# Patient Record
Sex: FEMALE | Race: BLACK OR AFRICAN AMERICAN | Employment: FULL TIME | URBAN - METROPOLITAN AREA
[De-identification: names, ages, dates, MRNs, and addresses within clinical notes are randomized per-mention and may not be internally consistent; named-entity substitution may affect disease eponyms.]

---

## 2017-01-12 ENCOUNTER — OFFICE VISIT (OUTPATIENT)
Dept: FAMILY MEDICINE CLINIC | Facility: CLINIC | Age: 62
End: 2017-01-12

## 2017-01-12 VITALS
HEIGHT: 69 IN | SYSTOLIC BLOOD PRESSURE: 137 MMHG | BODY MASS INDEX: 37.01 KG/M2 | TEMPERATURE: 98.6 F | DIASTOLIC BLOOD PRESSURE: 72 MMHG | HEART RATE: 102 BPM | WEIGHT: 249.9 LBS | OXYGEN SATURATION: 99 % | RESPIRATION RATE: 14 BRPM

## 2017-01-12 DIAGNOSIS — I10 ESSENTIAL HYPERTENSION: Primary | ICD-10-CM

## 2017-01-12 DIAGNOSIS — E78.5 HYPERLIPIDEMIA, UNSPECIFIED HYPERLIPIDEMIA TYPE: ICD-10-CM

## 2017-01-12 DIAGNOSIS — D64.9 MILD CHRONIC ANEMIA: ICD-10-CM

## 2017-01-12 DIAGNOSIS — E66.9 OBESITY (BMI 30-39.9): ICD-10-CM

## 2017-01-12 DIAGNOSIS — R73.02 IMPAIRED GLUCOSE TOLERANCE: ICD-10-CM

## 2017-01-12 RX ORDER — LANOLIN ALCOHOL/MO/W.PET/CERES
325 CREAM (GRAM) TOPICAL 2 TIMES DAILY WITH MEALS
Qty: 60 TAB | Refills: 3 | Status: SHIPPED | OUTPATIENT
Start: 2017-01-12 | End: 2017-05-27 | Stop reason: SDUPTHER

## 2017-01-12 RX ORDER — AMLODIPINE BESYLATE 10 MG/1
10 TABLET ORAL DAILY
Qty: 30 TAB | Refills: 3 | Status: SHIPPED | OUTPATIENT
Start: 2017-01-12 | End: 2017-06-13 | Stop reason: SDUPTHER

## 2017-01-12 RX ORDER — LOSARTAN POTASSIUM AND HYDROCHLOROTHIAZIDE 25; 100 MG/1; MG/1
1 TABLET ORAL DAILY
Qty: 30 TAB | Refills: 3 | Status: SHIPPED | OUTPATIENT
Start: 2017-01-12 | End: 2017-06-13 | Stop reason: SDUPTHER

## 2017-01-12 RX ORDER — ATORVASTATIN CALCIUM 20 MG/1
20 TABLET, FILM COATED ORAL DAILY
Qty: 30 TAB | Refills: 3 | Status: SHIPPED | OUTPATIENT
Start: 2017-01-12 | End: 2017-06-13 | Stop reason: SDUPTHER

## 2017-01-12 NOTE — PATIENT INSTRUCTIONS
Body Mass Index: Care Instructions  Your Care Instructions    Body mass index (BMI) can help you see if your weight is raising your risk for health problems. It uses a formula to compare how much you weigh with how tall you are. A BMI between 18.5 and 24.9 is considered healthy. A BMI between 25 and 29.9 is considered overweight. A BMI of 30 or higher is considered obese. If your BMI is in the normal range, it means that you have a lower risk for weight-related health problems. If your BMI is in the overweight or obese range, you may be at increased risk for weight-related health problems, such as high blood pressure, heart disease, stroke, arthritis or joint pain, and diabetes. BMI is just one measure of your risk for weight-related health problems. You may be at higher risk for health problems if you are not active, you eat an unhealthy diet, or you drink too much alcohol or use tobacco products. Follow-up care is a key part of your treatment and safety. Be sure to make and go to all appointments, and call your doctor if you are having problems. It's also a good idea to know your test results and keep a list of the medicines you take. How can you care for yourself at home? · Practice healthy eating habits. This includes eating plenty of fruits, vegetables, whole grains, lean protein, and low-fat dairy. · Get at least 30 minutes of exercise 5 days a week or more. Brisk walking is a good choice. You also may want to do other activities, such as running, swimming, cycling, or playing tennis or team sports. · Do not smoke. Smoking can increase your risk for health problems. If you need help quitting, talk to your doctor about stop-smoking programs and medicines. These can increase your chances of quitting for good. · Limit alcohol to 2 drinks a day for men and 1 drink a day for women. Too much alcohol can cause health problems.   If you have a BMI higher than 25  · Your doctor may do other tests to check your risk for weight-related health problems. This may include measuring the distance around your waist. A waist measurement of more than 40 inches in men or 35 inches in women can increase the risk of weight-related health problems. · Talk with your doctor about steps you can take to stay healthy or improve your health. You may need to make lifestyle changes to lose weight and stay healthy, such as changing your diet and getting regular exercise. Where can you learn more? Go to http://nahid-maureen.info/. Enter S176 in the search box to learn more about \"Body Mass Index: Care Instructions. \"  Current as of: February 16, 2016  Content Version: 11.1  © 1593-7339 [employer]. Care instructions adapted under license by [employer] (which disclaims liability or warranty for this information). If you have questions about a medical condition or this instruction, always ask your healthcare professional. [address] any warranty or liability for your use of this information. DASH Diet: Care Instructions  Your Care Instructions  The DASH diet is an eating plan that can help lower your blood pressure. DASH stands for Dietary Approaches to Stop Hypertension. Hypertension is high blood pressure. The DASH diet focuses on eating foods that are high in calcium, potassium, and magnesium. These nutrients can lower blood pressure. The foods that are highest in these nutrients are fruits, vegetables, low-fat dairy products, nuts, seeds, and legumes. But taking calcium, potassium, and magnesium supplements instead of eating foods that are high in those nutrients does not have the same effect. The DASH diet also includes whole grains, fish, and poultry. The DASH diet is one of several lifestyle changes your doctor may recommend to lower your high blood pressure. Your doctor may also want you to decrease the amount of sodium in your diet.  Lowering sodium while following the DASH diet can lower blood pressure even further than just the DASH diet alone. Follow-up care is a key part of your treatment and safety. Be sure to make and go to all appointments, and call your doctor if you are having problems. It's also a good idea to know your test results and keep a list of the medicines you take. How can you care for yourself at home? Following the DASH diet  · Eat 4 to 5 servings of fruit each day. A serving is 1 medium-sized piece of fruit, ½ cup chopped or canned fruit, 1/4 cup dried fruit, or 4 ounces (½ cup) of fruit juice. Choose fruit more often than fruit juice. · Eat 4 to 5 servings of vegetables each day. A serving is 1 cup of lettuce or raw leafy vegetables, ½ cup of chopped or cooked vegetables, or 4 ounces (½ cup) of vegetable juice. Choose vegetables more often than vegetable juice. · Get 2 to 3 servings of low-fat and fat-free dairy each day. A serving is 8 ounces of milk, 1 cup of yogurt, or 1 ½ ounces of cheese. · Eat 6 to 8 servings of grains each day. A serving is 1 slice of bread, 1 ounce of dry cereal, or ½ cup of cooked rice, pasta, or cooked cereal. Try to choose whole-grain products as much as possible. · Limit lean meat, poultry, and fish to 2 servings each day. A serving is 3 ounces, about the size of a deck of cards. · Eat 4 to 5 servings of nuts, seeds, and legumes (cooked dried beans, lentils, and split peas) each week. A serving is 1/3 cup of nuts, 2 tablespoons of seeds, or ½ cup of cooked beans or peas. · Limit fats and oils to 2 to 3 servings each day. A serving is 1 teaspoon of vegetable oil or 2 tablespoons of salad dressing. · Limit sweets and added sugars to 5 servings or less a week. A serving is 1 tablespoon jelly or jam, ½ cup sorbet, or 1 cup of lemonade. · Eat less than 2,300 milligrams (mg) of sodium a day. If you limit your sodium to 1,500 mg a day, you can lower your blood pressure even more.   Tips for success  · Start small. Do not try to make dramatic changes to your diet all at once. You might feel that you are missing out on your favorite foods and then be more likely to not follow the plan. Make small changes, and stick with them. Once those changes become habit, add a few more changes. · Try some of the following:  ¨ Make it a goal to eat a fruit or vegetable at every meal and at snacks. This will make it easy to get the recommended amount of fruits and vegetables each day. ¨ Try yogurt topped with fruit and nuts for a snack or healthy dessert. ¨ Add lettuce, tomato, cucumber, and onion to sandwiches. ¨ Combine a ready-made pizza crust with low-fat mozzarella cheese and lots of vegetable toppings. Try using tomatoes, squash, spinach, broccoli, carrots, cauliflower, and onions. ¨ Have a variety of cut-up vegetables with a low-fat dip as an appetizer instead of chips and dip. ¨ Sprinkle sunflower seeds or chopped almonds over salads. Or try adding chopped walnuts or almonds to cooked vegetables. ¨ Try some vegetarian meals using beans and peas. Add garbanzo or kidney beans to salads. Make burritos and tacos with mashed brown beans or black beans. Where can you learn more? Go to http://nahid-marueen.info/. Enter M539 in the search box to learn more about \"DASH Diet: Care Instructions. \"  Current as of: March 23, 2016  Content Version: 11.1  © 7476-8560 [employer]. Care instructions adapted under license by [employer] (which disclaims liability or warranty for this information). If you have questions about a medical condition or this instruction, always ask your healthcare professional. [place] [address] any warranty or liability for your use of this information. High Cholesterol: Care Instructions  Your Care Instructions  Cholesterol is a type of fat in your blood. It is needed for many body functions, such as making new cells.  Cholesterol is made by your body. It also comes from food you eat. High cholesterol means that you have too much of the fat in your blood. This raises your risk of a heart attack and stroke. LDL and HDL are part of your total cholesterol. LDL is the \"bad\" cholesterol. High LDL can raise your risk for heart disease, heart attack, and stroke. HDL is the \"good\" cholesterol. It helps clear bad cholesterol from the body. High HDL is linked with a lower risk of heart disease, heart attack, and stroke. Your cholesterol levels help your doctor find out your risk for having a heart attack or stroke. You and your doctor can talk about whether you need to lower your risk and what treatment is best for you. A heart-healthy lifestyle along with medicines can help lower your cholesterol and your risk. The way you choose to lower your risk will depend on how high your risk is for heart attack and stroke. It will also depend on how you feel about taking medicines. Follow-up care is a key part of your treatment and safety. Be sure to make and go to all appointments, and call your doctor if you are having problems. It's also a good idea to know your test results and keep a list of the medicines you take. How can you care for yourself at home? · Eat a variety of foods every day. Good choices include fruits, vegetables, whole grains (like oatmeal), dried beans and peas, nuts and seeds, soy products (like tofu), and fat-free or low-fat dairy products. · Replace butter, margarine, and hydrogenated or partially hydrogenated oils with olive and canola oils. (Canola oil margarine without trans fat is fine.)  · Replace red meat with fish, poultry, and soy protein (like tofu). · Limit processed and packaged foods like chips, crackers, and cookies. · Bake, broil, or steam foods. Don't arevalo them. · Be physically active. Get at least 30 minutes of exercise on most days of the week. Walking is a good choice.  You also may want to do other activities, such as running, swimming, cycling, or playing tennis or team sports. · Stay at a healthy weight or lose weight by making the changes in eating and physical activity listed above. Losing just a small amount of weight, even 5 to 10 pounds, can reduce your risk for having a heart attack or stroke. · Do not smoke. When should you call for help? Watch closely for changes in your health, and be sure to contact your doctor if:  · You need help making lifestyle changes. · You have questions about your medicine. Where can you learn more? Go to http://nahid-maureen.info/. Enter I621 in the search box to learn more about \"High Cholesterol: Care Instructions. \"  Current as of: January 27, 2016  Content Version: 11.1  © 4280-0937 [employer]. Care instructions adapted under license by [employer] (which disclaims liability or warranty for this information). If you have questions about a medical condition or this instruction, always ask your healthcare professional. [place] [address] any warranty or liability for your use of this information. Prediabetes: Care Instructions  Your Care Instructions  Prediabetes is a warning sign that you are at risk for getting type 2 diabetes. It means that your blood sugar is higher than it should be. The food you eat turns into sugar, which your body uses for energy. Normally, an organ called the pancreas makes insulin, which allows the sugar in your blood to get into your body's cells. But when your body can't use insulin the right way, the sugar doesn't move into cells. It stays in your blood instead. This is called insulin resistance. The buildup of sugar in the blood causes prediabetes. The good news is that lifestyle changes may help you get your blood sugar back to normal and help you avoid or delay diabetes. Follow-up care is a key part of your treatment and safety.  Be sure to make and go to all appointments, and call your doctor if you are having problems. It's also a good idea to know your test results and keep a list of the medicines you take. How can you care for yourself at home? · Watch your weight. A healthy weight helps your body use insulin properly. · Limit the amount of calories, sweets, and unhealthy fat you eat. Ask your doctor if you should see a dietitian. A registered dietitian can help you create meal plans that fit your lifestyle. · Get at least 30 minutes of exercise on most days of the week. Exercise helps control your blood sugar. It also helps you maintain a healthy weight. Walking is a good choice. You also may want to do other activities, such as running, swimming, cycling, or playing tennis or team sports. · Do not smoke. Smoking can make prediabetes worse. If you need help quitting, talk to your doctor about stop-smoking programs and medicines. These can increase your chances of quitting for good. · If your doctor prescribed medicines, take them exactly as prescribed. Call your doctor if you think you are having a problem with your medicine. You will get more details on the specific medicines your doctor prescribes. When should you call for help? Watch closely for changes in your health, and be sure to contact your doctor if:  · You have any symptoms of diabetes. These may include:  ¨ Being thirsty more often. ¨ Urinating more. ¨ Being hungrier. ¨ Losing weight. ¨ Being very tired. ¨ Having blurry vision. · You have a wound that will not heal.  · You have an infection that will not go away. · You have problems with your blood pressure. · You want more information about diabetes and how you can keep from getting it. Where can you learn more? Go to http://nahid-maureen.info/. Enter I222 in the search box to learn more about \"Prediabetes: Care Instructions. \"  Current as of: May 23, 2016  Content Version: 11.1  © 3271-1673 [employer].  Care instructions adapted under license by [employer] (which disclaims liability or warranty for this information). If you have questions about a medical condition or this instruction, always ask your healthcare professional. [address] any warranty or liability for your use of this information.

## 2017-01-12 NOTE — MR AVS SNAPSHOT
Visit Information Date & Time Provider Department Dept. Phone Encounter #  
 1/12/2017  9:45 AM Grace Burroughs MD [employer] [phone] [id number] Follow-up Instructions Return in about 3 months (around 4/12/2017), or if symptoms worsen or fail to improve, for HTN, Obesity, HLD. Upcoming Health Maintenance Date Due FOBT Q 1 YEAR AGE 50-75 7/23/2006 ZOSTER VACCINE AGE 60> 7/23/2016 BREAST CANCER SCRN MAMMOGRAM 7/26/2018 DTaP/Tdap/Td series (2 - Td) 10/31/2022 Allergies as of 1/12/2017  Review Complete On: 1/12/2017 By: Carole Padron No Known Allergies Current Immunizations  Reviewed on 10/12/2016 Name Date Influenza Vaccine (Quad) PF 10/12/2016 10:52 AM, 2/1/2016 10:37 AM  
 Influenza Vaccine PF 12/11/2013 TDAP Vaccine 10/31/2012 12:56 AM  
  
 Not reviewed this visit You Were Diagnosed With   
  
 Codes Comments Essential hypertension    -  Primary ICD-10-CM: I10 
ICD-9-CM: 401.9 Impaired glucose tolerance     ICD-10-CM: R73.02 
ICD-9-CM: 790.22 Hyperlipidemia, unspecified hyperlipidemia type     ICD-10-CM: E78.5 ICD-9-CM: 272.4 Obesity (BMI 30-39. 9)     ICD-10-CM: E66.9 ICD-9-CM: 278.00 Mild chronic anemia     ICD-10-CM: D64.9 ICD-9-CM: 661. 9 Vitals BP Pulse Temp Resp Height(growth percentile) Weight(growth percentile)  
 137/72 (BP 1 Location: Right arm, BP Patient Position: Sitting) (!) 112 98.6 °F (37 °C) (Oral) 14 5' 9\" (1.753 m) 249 lb 14.4 oz (113.4 kg) SpO2 BMI OB Status Smoking Status 99% 36.9 kg/m2 Hysterectomy Never Smoker BMI and BSA Data Body Mass Index Body Surface Area  
 36.9 kg/m 2 2.35 m 2 Preferred Pharmacy Pharmacy Name Phone [facility] [address] [phone] Your Updated Medication List  
  
   
This list is accurate as of: 1/12/17 10:26 AM.  Always use your most recent med list. amLODIPine 10 mg tablet Commonly known as:  Erickson Alstrom Take 1 Tab by mouth daily. atorvastatin 20 mg tablet Commonly known as:  LIPITOR Take 1 Tab by mouth daily. bisacodyl 5 mg EC tablet Commonly known as:  DULCOLAX (BISACODYL) Take 1 Tab by mouth daily. ferrous sulfate 325 mg (65 mg iron) tablet Take 1 Tab by mouth two (2) times daily (with meals). fexofenadine 180 mg tablet Commonly known as:  Johanne Frankel Take 1 Tab by mouth daily. losartan-hydroCHLOROthiazide 100-25 mg per tablet Commonly known as:  HYZAAR Take 1 Tab by mouth daily. Magnesium Oxide 500 mg Cap Take  by mouth daily. morinda citrifolia fruit 250 mg Cap Take 1 Cap by mouth daily. ONE-A-DAY WOMEN'S 50+ PO Take  by mouth daily. polyethylene glycol 17 gram/dose powder Commonly known as:  Regmariluz Bullarder Take as directed by office Prescriptions Sent to Pharmacy Refills  
 ferrous sulfate 325 mg (65 mg iron) tablet 3 Sig: Take 1 Tab by mouth two (2) times daily (with meals). Class: Normal  
 Pharmacy: [address] [place] [address] Ph #: [phone] Route: Oral  
 atorvastatin (LIPITOR) 20 mg tablet 3 Sig: Take 1 Tab by mouth daily. Class: Normal  
 Pharmacy: [address] [place] [address] Ph #: [phone] Route: Oral  
 amLODIPine (NORVASC) 10 mg tablet 3 Sig: Take 1 Tab by mouth daily. Class: Normal  
 Pharmacy: [address] [place] [address] Ph #: [phone] Route: Oral  
 losartan-hydroCHLOROthiazide (HYZAAR) 100-25 mg per tablet 3 Sig: Take 1 Tab by mouth daily. Class: Normal  
 Pharmacy: [address] [place] [address] Ph #: [phone] Route: Oral  
  
Follow-up Instructions  Return in about 3 months (around 4/12/2017), or if symptoms worsen or fail to improve, for HTN, Obesity, HLD. To-Do List   
 01/12/2017 Lab:  CBC WITH AUTOMATED DIFF Patient Instructions Body Mass Index: Care Instructions Your Care Instructions Body mass index (BMI) can help you see if your weight is raising your risk for health problems. It uses a formula to compare how much you weigh with how tall you are. A BMI between 18.5 and 24.9 is considered healthy. A BMI between 25 and 29.9 is considered overweight. A BMI of 30 or higher is considered obese. If your BMI is in the normal range, it means that you have a lower risk for weight-related health problems. If your BMI is in the overweight or obese range, you may be at increased risk for weight-related health problems, such as high blood pressure, heart disease, stroke, arthritis or joint pain, and diabetes. BMI is just one measure of your risk for weight-related health problems. You may be at higher risk for health problems if you are not active, you eat an unhealthy diet, or you drink too much alcohol or use tobacco products. Follow-up care is a key part of your treatment and safety. Be sure to make and go to all appointments, and call your doctor if you are having problems. It's also a good idea to know your test results and keep a list of the medicines you take. How can you care for yourself at home? · Practice healthy eating habits. This includes eating plenty of fruits, vegetables, whole grains, lean protein, and low-fat dairy. · Get at least 30 minutes of exercise 5 days a week or more. Brisk walking is a good choice. You also may want to do other activities, such as running, swimming, cycling, or playing tennis or team sports. · Do not smoke. Smoking can increase your risk for health problems. If you need help quitting, talk to your doctor about stop-smoking programs and medicines. These can increase your chances of quitting for good. · Limit alcohol to 2 drinks a day for men and 1 drink a day for women. Too much alcohol can cause health problems. If you have a BMI higher than 25 · Your doctor may do other tests to check your risk for weight-related health problems. This may include measuring the distance around your waist. A waist measurement of more than 40 inches in men or 35 inches in women can increase the risk of weight-related health problems. · Talk with your doctor about steps you can take to stay healthy or improve your health. You may need to make lifestyle changes to lose weight and stay healthy, such as changing your diet and getting regular exercise. Where can you learn more? Go to http://nahid-maureen.info/. Enter S176 in the search box to learn more about \"Body Mass Index: Care Instructions. \" Current as of: February 16, 2016 Content Version: 11.1 © 0573-5534 [employer]. Care instructions adapted under license by [email or web address] (which disclaims liability or warranty for this information). If you have questions about a medical condition or this instruction, always ask your healthcare professional. [address] any warranty or liability for your use of this information. DASH Diet: Care Instructions Your Care Instructions The DASH diet is an eating plan that can help lower your blood pressure. DASH stands for Dietary Approaches to Stop Hypertension. Hypertension is high blood pressure. The DASH diet focuses on eating foods that are high in calcium, potassium, and magnesium. These nutrients can lower blood pressure. The foods that are highest in these nutrients are fruits, vegetables, low-fat dairy products, nuts, seeds, and legumes. But taking calcium, potassium, and magnesium supplements instead of eating foods that are high in those nutrients does not have the same effect. The DASH diet also includes whole grains, fish, and poultry. The DASH diet is one of several lifestyle changes your doctor may recommend to lower your high blood pressure. Your doctor may also want you to decrease the amount of sodium in your diet. Lowering sodium while following the DASH diet can lower blood pressure even further than just the DASH diet alone. Follow-up care is a key part of your treatment and safety. Be sure to make and go to all appointments, and call your doctor if you are having problems. It's also a good idea to know your test results and keep a list of the medicines you take. How can you care for yourself at home? Following the DASH diet · Eat 4 to 5 servings of fruit each day. A serving is 1 medium-sized piece of fruit, ½ cup chopped or canned fruit, 1/4 cup dried fruit, or 4 ounces (½ cup) of fruit juice. Choose fruit more often than fruit juice. · Eat 4 to 5 servings of vegetables each day. A serving is 1 cup of lettuce or raw leafy vegetables, ½ cup of chopped or cooked vegetables, or 4 ounces (½ cup) of vegetable juice. Choose vegetables more often than vegetable juice. · Get 2 to 3 servings of low-fat and fat-free dairy each day. A serving is 8 ounces of milk, 1 cup of yogurt, or 1 ½ ounces of cheese. · Eat 6 to 8 servings of grains each day. A serving is 1 slice of bread, 1 ounce of dry cereal, or ½ cup of cooked rice, pasta, or cooked cereal. Try to choose whole-grain products as much as possible. · Limit lean meat, poultry, and fish to 2 servings each day. A serving is 3 ounces, about the size of a deck of cards. · Eat 4 to 5 servings of nuts, seeds, and legumes (cooked dried beans, lentils, and split peas) each week. A serving is 1/3 cup of nuts, 2 tablespoons of seeds, or ½ cup of cooked beans or peas. · Limit fats and oils to 2 to 3 servings each day. A serving is 1 teaspoon of vegetable oil or 2 tablespoons of salad dressing. · Limit sweets and added sugars to 5 servings or less a week.  A serving is 1 tablespoon jelly or jam, ½ cup sorbet, or 1 cup of lemonade. · Eat less than 2,300 milligrams (mg) of sodium a day. If you limit your sodium to 1,500 mg a day, you can lower your blood pressure even more. Tips for success · Start small. Do not try to make dramatic changes to your diet all at once. You might feel that you are missing out on your favorite foods and then be more likely to not follow the plan. Make small changes, and stick with them. Once those changes become habit, add a few more changes. · Try some of the following: ¨ Make it a goal to eat a fruit or vegetable at every meal and at snacks. This will make it easy to get the recommended amount of fruits and vegetables each day. ¨ Try yogurt topped with fruit and nuts for a snack or healthy dessert. ¨ Add lettuce, tomato, cucumber, and onion to sandwiches. ¨ Combine a ready-made pizza crust with low-fat mozzarella cheese and lots of vegetable toppings. Try using tomatoes, squash, spinach, broccoli, carrots, cauliflower, and onions. ¨ Have a variety of cut-up vegetables with a low-fat dip as an appetizer instead of chips and dip. ¨ Sprinkle sunflower seeds or chopped almonds over salads. Or try adding chopped walnuts or almonds to cooked vegetables. ¨ Try some vegetarian meals using beans and peas. Add garbanzo or kidney beans to salads. Make burritos and tacos with mashed brown beans or black beans. Where can you learn more? Go to http://nahid-maureen.info/. Enter X355 in the search box to learn more about \"DASH Diet: Care Instructions. \" Current as of: March 23, 2016 Content Version: 11.1 © 0573-1928 [employer]. Care instructions adapted under license by [employer] (which disclaims liability or warranty for this information).  If you have questions about a medical condition or this instruction, always ask your healthcare professional. [employer] disclaims any warranty or liability for your use of this information. High Cholesterol: Care Instructions Your Care Instructions Cholesterol is a type of fat in your blood. It is needed for many body functions, such as making new cells. Cholesterol is made by your body. It also comes from food you eat. High cholesterol means that you have too much of the fat in your blood. This raises your risk of a heart attack and stroke. LDL and HDL are part of your total cholesterol. LDL is the \"bad\" cholesterol. High LDL can raise your risk for heart disease, heart attack, and stroke. HDL is the \"good\" cholesterol. It helps clear bad cholesterol from the body. High HDL is linked with a lower risk of heart disease, heart attack, and stroke. Your cholesterol levels help your doctor find out your risk for having a heart attack or stroke. You and your doctor can talk about whether you need to lower your risk and what treatment is best for you. A heart-healthy lifestyle along with medicines can help lower your cholesterol and your risk. The way you choose to lower your risk will depend on how high your risk is for heart attack and stroke. It will also depend on how you feel about taking medicines. Follow-up care is a key part of your treatment and safety. Be sure to make and go to all appointments, and call your doctor if you are having problems. It's also a good idea to know your test results and keep a list of the medicines you take. How can you care for yourself at home? · Eat a variety of foods every day. Good choices include fruits, vegetables, whole grains (like oatmeal), dried beans and peas, nuts and seeds, soy products (like tofu), and fat-free or low-fat dairy products. · Replace butter, margarine, and hydrogenated or partially hydrogenated oils with olive and canola oils. (Canola oil margarine without trans fat is fine.) · Replace red meat with fish, poultry, and soy protein (like tofu). · Limit processed and packaged foods like chips, crackers, and cookies. · Bake, broil, or steam foods. Don't arevalo them. · Be physically active. Get at least 30 minutes of exercise on most days of the week. Walking is a good choice. You also may want to do other activities, such as running, swimming, cycling, or playing tennis or team sports. · Stay at a healthy weight or lose weight by making the changes in eating and physical activity listed above. Losing just a small amount of weight, even 5 to 10 pounds, can reduce your risk for having a heart attack or stroke. · Do not smoke. When should you call for help? Watch closely for changes in your health, and be sure to contact your doctor if: 
· You need help making lifestyle changes. · You have questions about your medicine. Where can you learn more? Go to http://nahdiStalkthismaureen.info/. Enter B547 in the search box to learn more about \"High Cholesterol: Care Instructions. \" Current as of: January 27, 2016 Content Version: 11.1 © 7938-2501 [employer]. Care instructions adapted under license by [employer] (which disclaims liability or warranty for this information). If you have questions about a medical condition or this instruction, always ask your healthcare professional. [address] any warranty or liability for your use of this information. Prediabetes: Care Instructions Your Care Instructions Prediabetes is a warning sign that you are at risk for getting type 2 diabetes. It means that your blood sugar is higher than it should be. The food you eat turns into sugar, which your body uses for energy. Normally, an organ called the pancreas makes insulin, which allows the sugar in your blood to get into your body's cells. But when your body can't use insulin the right way, the sugar doesn't move into cells.  It stays in your blood instead. This is called insulin resistance. The buildup of sugar in the blood causes prediabetes. The good news is that lifestyle changes may help you get your blood sugar back to normal and help you avoid or delay diabetes. Follow-up care is a key part of your treatment and safety. Be sure to make and go to all appointments, and call your doctor if you are having problems. It's also a good idea to know your test results and keep a list of the medicines you take. How can you care for yourself at home? · Watch your weight. A healthy weight helps your body use insulin properly. · Limit the amount of calories, sweets, and unhealthy fat you eat. Ask your doctor if you should see a dietitian. A registered dietitian can help you create meal plans that fit your lifestyle. · Get at least 30 minutes of exercise on most days of the week. Exercise helps control your blood sugar. It also helps you maintain a healthy weight. Walking is a good choice. You also may want to do other activities, such as running, swimming, cycling, or playing tennis or team sports. · Do not smoke. Smoking can make prediabetes worse. If you need help quitting, talk to your doctor about stop-smoking programs and medicines. These can increase your chances of quitting for good. · If your doctor prescribed medicines, take them exactly as prescribed. Call your doctor if you think you are having a problem with your medicine. You will get more details on the specific medicines your doctor prescribes. When should you call for help? Watch closely for changes in your health, and be sure to contact your doctor if: 
· You have any symptoms of diabetes. These may include: ¨ Being thirsty more often. ¨ Urinating more. ¨ Being hungrier. ¨ Losing weight. ¨ Being very tired. ¨ Having blurry vision. · You have a wound that will not heal. 
· You have an infection that will not go away. · You have problems with your blood pressure. · You want more information about diabetes and how you can keep from getting it. Where can you learn more? Go to http://nahid-maureen.info/. Enter I222 in the search box to learn more about \"Prediabetes: Care Instructions. \" Current as of: May 23, 2016 Content Version: 11.1 © 9195-0217 [employer]. Care instructions adapted under license by [employer] (which disclaims liability or warranty for this information). If you have questions about a medical condition or this instruction, always ask your healthcare professional. [address] any warranty or liability for your use of this information. Introducing [facility]! [facility] introduces [employer] patient portal. Now you can access parts of your medical record, email your doctor's office, and request medication refills online. 1. In your internet browser, go to [email or web address] 2. Click on the First Time User? Click Here link in the Sign In box. You will see the New Member Sign Up page. 3. Enter your Double [employer] Access Code exactly as it appears below. You will not need to use this code after youve completed the sign-up process. If you do not sign up before the expiration date, you must request a new code. · [employer] Access Code: [email or web address] Expires: 4/12/2017 10:26 AM 
 
4. Enter the last four digits of your Social Security Number (xxxx) and Date of Birth (mm/dd/yyyy) as indicated and click Submit. You will be taken to the next sign-up page. 5. Create a SafetyTatt ID. This will be your Double Encore login ID and cannot be changed, so think of one that is secure and easy to remember. 6. Create a [employer] En[employer] password. You can change your password at any time. 7. Enter your Password Reset Question and Answer. This can be used at a later time if you forget your password. 8. Enter your e-mail address.  You will receive e-mail notification when new information is available in [employer]. 9. Click Sign Up. You can now view and download portions of your medical record. 10. Click the Download Summary menu link to download a portable copy of your medical information. If you have questions, please visit the Frequently Asked Questions section of the [employer] website. Remember, [employer] is NOT to be used for urgent needs. For medical emergencies, dial 911. Now available from your iPhone and Android! Please provide this summary of care documentation to your next provider. Your primary care clinician is listed as Matilde Guerra. If you have any questions after today's visit, please call [phone].

## 2017-01-12 NOTE — PROGRESS NOTES
Chief Complaint   Patient presents with    Follow-up     3 mos    Hypertension    Obesity       Hypertension stable. Diet and Lifestyle: generally follows a low fat low cholesterol diet, generally follows a low sodium diet, sedentary    Home BP Monitoring: is not measured at home. HLD- stable, compliant with medication  IGT/Obesity: gained several pounds over holidays,     Recently had right TKA on 2/8/2016 and left TKA 6/13/2016 with Dr Estrada Edouard  Patient is doing well, stable on all medications. 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 Patient Active Problem List   Diagnosis Code    Arthritis M19.90    Knee pain M25.569    Mild chronic anemia D64.9    Impaired glucose tolerance R73.02    Essential hypertension I10    Obesity (BMI 30-39. 9) E66.9    Hyperlipidemia E78.5    Osteoarthritis, knee M17.9    Knee osteoarthritis M17.9       Review of Systems   Complete ROS negative except where noted in HPI    Objective:     Visit Vitals    /72 (BP 1 Location: Right arm, BP Patient Position: Sitting)    Pulse (!) 102    Temp 98.6 °F (37 °C) (Oral)    Resp 14    Ht 5' 9\" (1.753 m)    Wt 249 lb 14.4 oz (113.4 kg)    SpO2 99%    BMI 36.9 kg/m2     No exam data present    Constitutional: The patient appears well, NAD, Alert & Oriented x 3  Psych: Normal Mood, Normal Behavior  ENT: Senora Griffes, no scleral icterus  Lungs: CTAB,  Normal effort , Good air entry, No W/R/R   Cardiovascular:RRR, No M/R/G, S1 and S2 normal  Extremities: 1+  LE edema, feet: warm, good capillary refill    Lance Torres was seen today for follow-up, hypertension and obesity. Diagnoses and all orders for this visit:    Essential hypertension  -     amLODIPine (NORVASC) 10 mg tablet; Take 1 Tab by mouth daily. -     losartan-hydroCHLOROthiazide (HYZAAR) 100-25 mg per tablet;  Take 1 Tab by mouth daily. Impaired glucose tolerance    Hyperlipidemia, unspecified hyperlipidemia type  -     atorvastatin (LIPITOR) 20 mg tablet; Take 1 Tab by mouth daily. Obesity (BMI 30-39. 9)    Mild chronic anemia  -     CBC WITH AUTOMATED DIFF; Future  -     ferrous sulfate 325 mg (65 mg iron) tablet; Take 1 Tab by mouth two (2) times daily (with meals). I have discussed the diagnosis with the patient and the intended plan as seen in the above orders. The patient has received an after-visit summary and questions were answered concerning future plans. I have discussed medication side effects and warnings with the patient as well. I have reviewed the plan of care with the patient, accepted their input and they are in agreement with the treatment goals. Patient verbalizes understanding. Follow-up Disposition:  Return in about 3 months (around 4/12/2017), or if symptoms worsen or fail to improve, for HTN, Obesity, HLD.

## 2017-01-30 ENCOUNTER — HOSPITAL ENCOUNTER (OUTPATIENT)
Dept: LAB | Age: 62
Discharge: HOME | End: 2017-01-30

## 2017-01-30 PROCEDURE — 99001 SPECIMEN HANDLING PT-LAB: CPT | Performed by: FAMILY MEDICINE

## 2017-01-31 LAB
BASOPHILS # BLD AUTO: 0 X10E3/UL (ref 0–0.2)
BASOPHILS NFR BLD AUTO: 0 %
BUN SERPL-MCNC: 14 MG/DL (ref 8–27)
BUN/CREAT SERPL: 21 (ref 11–26)
CALCIUM SERPL-MCNC: 9.7 MG/DL (ref 8.7–10.3)
CHLORIDE SERPL-SCNC: 100 MMOL/L (ref 96–106)
CHOLEST SERPL-MCNC: 150 MG/DL (ref 100–199)
CO2 SERPL-SCNC: 26 MMOL/L (ref 18–29)
CREAT SERPL-MCNC: 0.67 MG/DL (ref 0.57–1)
EOSINOPHIL # BLD AUTO: 0.2 X10E3/UL (ref 0–0.4)
EOSINOPHIL NFR BLD AUTO: 2 %
ERYTHROCYTE [DISTWIDTH] IN BLOOD BY AUTOMATED COUNT: 15.3 % (ref 12.3–15.4)
EST. AVERAGE GLUCOSE BLD GHB EST-MCNC: 131 MG/DL
GLUCOSE SERPL-MCNC: 117 MG/DL (ref 65–99)
HBA1C MFR BLD: 6.2 % (ref 4.8–5.6)
HCT VFR BLD AUTO: 32.9 % (ref 34–46.6)
HDLC SERPL-MCNC: 79 MG/DL
HGB BLD-MCNC: 11.2 G/DL (ref 11.1–15.9)
IMM GRANULOCYTES # BLD: 0 X10E3/UL (ref 0–0.1)
IMM GRANULOCYTES NFR BLD: 0 %
LDLC SERPL CALC-MCNC: 58 MG/DL (ref 0–99)
LYMPHOCYTES # BLD AUTO: 1.9 X10E3/UL (ref 0.7–3.1)
LYMPHOCYTES NFR BLD AUTO: 23 %
MCH RBC QN AUTO: 27.1 PG (ref 26.6–33)
MCHC RBC AUTO-ENTMCNC: 34 G/DL (ref 31.5–35.7)
MCV RBC AUTO: 80 FL (ref 79–97)
MONOCYTES # BLD AUTO: 0.6 X10E3/UL (ref 0.1–0.9)
MONOCYTES NFR BLD AUTO: 7 %
NEUTROPHILS # BLD AUTO: 5.5 X10E3/UL (ref 1.4–7)
NEUTROPHILS NFR BLD AUTO: 68 %
PLATELET # BLD AUTO: 315 X10E3/UL (ref 150–379)
POTASSIUM SERPL-SCNC: 3.6 MMOL/L (ref 3.5–5.2)
RBC # BLD AUTO: 4.14 X10E6/UL (ref 3.77–5.28)
SODIUM SERPL-SCNC: 142 MMOL/L (ref 134–144)
TRIGL SERPL-MCNC: 64 MG/DL (ref 0–149)
VLDLC SERPL CALC-MCNC: 13 MG/DL (ref 5–40)
WBC # BLD AUTO: 8.2 X10E3/UL (ref 3.4–10.8)

## 2017-02-16 NOTE — PROGRESS NOTES
Patient made aware of lab results. Verified name and . Patient verbalized an understanding of results and did not voice any concerns at this time.

## 2017-05-27 DIAGNOSIS — D64.9 MILD CHRONIC ANEMIA: ICD-10-CM

## 2017-05-30 RX ORDER — LANOLIN ALCOHOL/MO/W.PET/CERES
CREAM (GRAM) TOPICAL
Qty: 60 TAB | Refills: 3 | Status: SHIPPED | OUTPATIENT
Start: 2017-05-30 | End: 2017-10-05 | Stop reason: SDUPTHER

## 2017-07-14 ENCOUNTER — OFFICE VISIT (OUTPATIENT)
Dept: FAMILY MEDICINE CLINIC | Facility: CLINIC | Age: 62
End: 2017-07-14

## 2017-07-14 VITALS
OXYGEN SATURATION: 99 % | WEIGHT: 255 LBS | HEIGHT: 69 IN | DIASTOLIC BLOOD PRESSURE: 80 MMHG | SYSTOLIC BLOOD PRESSURE: 142 MMHG | HEART RATE: 85 BPM | RESPIRATION RATE: 16 BRPM | TEMPERATURE: 97.8 F | BODY MASS INDEX: 37.77 KG/M2

## 2017-07-14 DIAGNOSIS — E78.5 HYPERLIPIDEMIA, UNSPECIFIED HYPERLIPIDEMIA TYPE: ICD-10-CM

## 2017-07-14 DIAGNOSIS — R60.9 PERIPHERAL EDEMA: ICD-10-CM

## 2017-07-14 DIAGNOSIS — E66.9 OBESITY (BMI 30-39.9): ICD-10-CM

## 2017-07-14 DIAGNOSIS — S20.211A SUPERFICIAL BRUISING OF CHEST WALL, RIGHT, INITIAL ENCOUNTER: ICD-10-CM

## 2017-07-14 DIAGNOSIS — R73.02 IMPAIRED GLUCOSE TOLERANCE: ICD-10-CM

## 2017-07-14 DIAGNOSIS — I10 ESSENTIAL HYPERTENSION: Primary | ICD-10-CM

## 2017-07-14 DIAGNOSIS — R07.89 CHEST WALL PAIN: ICD-10-CM

## 2017-07-14 RX ORDER — LOSARTAN POTASSIUM AND HYDROCHLOROTHIAZIDE 25; 100 MG/1; MG/1
TABLET ORAL
Qty: 30 TAB | Refills: 3 | Status: SHIPPED | OUTPATIENT
Start: 2017-07-14 | End: 2017-11-13 | Stop reason: SDUPTHER

## 2017-07-14 RX ORDER — TIZANIDINE 4 MG/1
4 TABLET ORAL
Qty: 90 TAB | Refills: 0 | Status: SHIPPED | OUTPATIENT
Start: 2017-07-14 | End: 2017-08-12 | Stop reason: SDUPTHER

## 2017-07-14 RX ORDER — ATORVASTATIN CALCIUM 20 MG/1
TABLET, FILM COATED ORAL
Qty: 30 TAB | Refills: 3 | Status: SHIPPED | OUTPATIENT
Start: 2017-07-14 | End: 2017-11-13 | Stop reason: SDUPTHER

## 2017-07-14 RX ORDER — AMLODIPINE BESYLATE 10 MG/1
TABLET ORAL
Qty: 30 TAB | Refills: 3 | Status: SHIPPED | OUTPATIENT
Start: 2017-07-14 | End: 2017-11-13 | Stop reason: SDUPTHER

## 2017-07-14 NOTE — MR AVS SNAPSHOT
Visit Information Date & Time Provider Department Dept. Phone Encounter #  
 7/14/2017  8:30 AM Wayne Hall MD [facility] [phone] [id number] Follow-up Instructions Return in about 3 months (around 10/14/2017), or if symptoms worsen or fail to improve, for HTN. Upcoming Health Maintenance Date Due FOBT Q 1 YEAR AGE 50-75 7/23/2006 ZOSTER VACCINE AGE 60> 7/23/2016 INFLUENZA AGE 9 TO ADULT 8/1/2017 BREAST CANCER SCRN MAMMOGRAM 7/26/2018 DTaP/Tdap/Td series (2 - Td) 10/31/2022 Allergies as of 7/14/2017  Review Complete On: 7/14/2017 By: Wayne Hall MD  
 No Known Allergies Current Immunizations  Reviewed on 10/12/2016 Name Date Influenza Vaccine (Quad) PF 10/12/2016 10:52 AM, 2/1/2016 10:37 AM  
 Influenza Vaccine PF 12/11/2013 TDAP Vaccine 10/31/2012 12:56 AM  
  
 Not reviewed this visit You Were Diagnosed With   
  
 Codes Comments Essential hypertension    -  Primary ICD-10-CM: I10 
ICD-9-CM: 401.9 Impaired glucose tolerance     ICD-10-CM: R73.02 
ICD-9-CM: 790.22 Obesity (BMI 30-39. 9)     ICD-10-CM: E66.9 ICD-9-CM: 278.00 Hyperlipidemia, unspecified hyperlipidemia type     ICD-10-CM: E78.5 ICD-9-CM: 272.4 Chest wall pain     ICD-10-CM: R07.89 ICD-9-CM: 786.52 Superficial bruising of chest wall, right, initial encounter     ICD-10-CM: S20.211A ICD-9-CM: 922.1 Peripheral edema     ICD-10-CM: R60.9 ICD-9-CM: 155. 3 Vitals BP Pulse Temp Resp Height(growth percentile) Weight(growth percentile) 142/80 85 97.8 °F (36.6 °C) 16 5' 9\" (1.753 m) 255 lb (115.7 kg) SpO2 BMI OB Status Smoking Status 99% 37.66 kg/m2 Hysterectomy Never Smoker Vitals History BMI and BSA Data Body Mass Index Body Surface Area  
 37.66 kg/m 2 2.37 m 2 Preferred Pharmacy Pharmacy Name Phone [id number] [place] [address] [phone] Your Updated Medication List  
  
   
This list is accurate as of: 7/14/17  9:15 AM.  Always use your most recent med list. amLODIPine 10 mg tablet Commonly known as:  Celia Pall TAKE 1 TABLET BY MOUTH DAILY  
  
 atorvastatin 20 mg tablet Commonly known as:  LIPITOR  
TAKE 1 TABLET BY MOUTH DAILY  
  
 bisacodyl 5 mg EC tablet Commonly known as:  DULCOLAX (BISACODYL) Take 1 Tab by mouth daily. Comp. [address] [place] [address] Use daily and take off at night, 20mmHg  
  
 ferrous sulfate 325 mg (65 mg iron) tablet TAKE 1 TABLET BY MOUTH TWICE A DAY WITH MEALS  
  
 fexofenadine 180 mg tablet Commonly known as:  Indy Sachs Take 1 Tab by mouth daily. losartan-hydroCHLOROthiazide 100-25 mg per tablet Commonly known as:  HYZAAR  
TAKE 1 TABLET BY MOUTH DAILY. Magnesium Oxide 500 mg Cap Take  by mouth daily. morinda citrifolia fruit 250 mg Cap Take 1 Cap by mouth daily. ONE-A-DAY WOMEN'S 50+ PO Take  by mouth daily. polyethylene glycol 17 gram/dose powder Commonly known as:  Court Hugger Take as directed by office  
  
 tiZANidine 4 mg tablet Commonly known as:  Moreno Husk Take 1 Tab by mouth three (3) times daily as needed. Prescriptions Sent to Pharmacy Refills  
 tiZANidine (ZANAFLEX) 4 mg tablet 0 Sig: Take 1 Tab by mouth three (3) times daily as needed. Class: Normal  
 Pharmacy: [address] [place] [address] Ph #: [phone] Route: Oral  
 Comp. Stocking,Knee,Regular,Lrg misc 0 Sig: Use daily and take off at night, 20mmHg Class: Normal  
 Pharmacy: [facility]/pharmacy [address] [facility] [address] Ph #: [phone]  
 losartan-hydroCHLOROthiazide (HYZAAR) 100-25 mg per tablet 3 Sig: TAKE 1 TABLET BY MOUTH DAILY.   
 Class: Normal  
 [facility] [address] [facility] [address] Ph #: [phone]  
 amLODIPine (NORVASC) 10 mg tablet 3 Sig: TAKE 1 TABLET BY MOUTH DAILY Class: Normal  
 Pharmacy: [facility]/pharmacy [address] [place] [address] [address] Ph #: [phone]  
 atorvastatin (LIPITOR) 20 mg tablet 3 Sig: TAKE 1 TABLET BY MOUTH DAILY Class: Normal  
 Pharm[facility] Ph #: [phone] Follow-up Instructions Return in about 3 months (around 10/14/2017), or if symptoms worsen or fail to improve, for HTN. To-Do List   
 07/14/2017 Lab:  HEMOGLOBIN A1C WITH EAG   
  
 07/14/2017 Lab:  METABOLIC PANEL, BASIC Patient Instructions Body Mass Index: Care Instructions Your Care Instructions Body mass index (BMI) can help you see if your weight is raising your risk for health problems. It uses a formula to compare how much you weigh with how tall you are. · A BMI lower than 18.5 is considered underweight. · A BMI between 18.5 and 24.9 is considered healthy. · A BMI between 25 and 29.9 is considered overweight. A BMI of 30 or higher is considered obese. If your BMI is in the normal range, it means that you have a lower risk for weight-related health problems. If your BMI is in the overweight or obese range, you may be at increased risk for weight-related health problems, such as high blood pressure, heart disease, stroke, arthritis or joint pain, and diabetes. If your BMI is in the underweight range, you may be at increased risk for health problems such as fatigue, lower protection (immunity) against illness, muscle loss, bone loss, hair loss, and hormone problems. BMI is just one measure of your risk for weight-related health problems. You may be at higher risk for health problems if you are not active, you eat an unhealthy diet, or you drink too much alcohol or use tobacco products. Follow-up care is a key part of your treatment and safety. Be sure to make and go to all appointments, and call your doctor if you are having problems. It's also a good idea to know your test results and keep a list of the medicines you take. How can you care for yourself at home? · Practice healthy eating habits. This includes eating plenty of fruits, vegetables, whole grains, lean protein, and low-fat dairy. · If your doctor recommends it, get more exercise. Walking is a good choice. Bit by bit, increase the amount you walk every day. Try for at least 30 minutes on most days of the week. · Do not smoke. Smoking can increase your risk for health problems. If you need help quitting, talk to your doctor about stop-smoking programs and medicines. These can increase your chances of quitting for good. · Limit alcohol to 2 drinks a day for men and 1 drink a day for women. Too much alcohol can cause health problems. If you have a BMI higher than 25 · Your doctor may do other tests to check your risk for weight-related health problems. This may include measuring the distance around your waist. A waist measurement of more than 40 inches in men or 35 inches in women can increase the risk of weight-related health problems. · Talk with your doctor about steps you can take to stay healthy or improve your health. You may need to make lifestyle changes to lose weight and stay healthy, such as changing your diet and getting regular exercise. If you have a BMI lower than 18.5 · Your doctor may do other tests to check your risk for health problems. · Talk with your doctor about steps you can take to stay healthy or improve your health. You may need to make lifestyle changes to gain or maintain weight and stay healthy, such as getting more healthy foods in your diet and doing exercises to build muscle. Where can you learn more? Go to http://nahid-maureen.info/. Enter S176 in the search box to learn more about \"Body Mass Index: Care Instructions. \" Current as of: January 23, 2017 Content Version: 11.3 © 0828-8171 [employer]. Care instructions adapted under license by [employer] (which disclaims liability or warranty for this information). If you have questions about a medical condition or this instruction, always ask your healthcare professional. [address] any warranty or liability for your use of this information. DASH Diet: Care Instructions Your Care Instructions The DASH diet is an eating plan that can help lower your blood pressure. DASH stands for Dietary Approaches to Stop Hypertension. Hypertension is high blood pressure. The DASH diet focuses on eating foods that are high in calcium, potassium, and magnesium. These nutrients can lower blood pressure. The foods that are highest in these nutrients are fruits, vegetables, low-fat dairy products, nuts, seeds, and legumes. But taking calcium, potassium, and magnesium supplements instead of eating foods that are high in those nutrients does not have the same effect. The DASH diet also includes whole grains, fish, and poultry. The DASH diet is one of several lifestyle changes your doctor may recommend to lower your high blood pressure. Your doctor may also want you to decrease the amount of sodium in your diet. Lowering sodium while following the DASH diet can lower blood pressure even further than just the DASH diet alone. Follow-up care is a key part of your treatment and safety. Be sure to make and go to all appointments, and call your doctor if you are having problems. It's also a good idea to know your test results and keep a list of the medicines you take. How can you care for yourself at home? Following the DASH diet · Eat 4 to 5 servings of fruit each day.  A serving is 1 medium-sized piece of fruit, ½ cup chopped or canned fruit, 1/4 cup dried fruit, or 4 ounces (½ cup) of fruit juice. Choose fruit more often than fruit juice. · Eat 4 to 5 servings of vegetables each day. A serving is 1 cup of lettuce or raw leafy vegetables, ½ cup of chopped or cooked vegetables, or 4 ounces (½ cup) of vegetable juice. Choose vegetables more often than vegetable juice. · Get 2 to 3 servings of low-fat and fat-free dairy each day. A serving is 8 ounces of milk, 1 cup of yogurt, or 1 ½ ounces of cheese. · Eat 6 to 8 servings of grains each day. A serving is 1 slice of bread, 1 ounce of dry cereal, or ½ cup of cooked rice, pasta, or cooked cereal. Try to choose whole-grain products as much as possible. · Limit lean meat, poultry, and fish to 2 servings each day. A serving is 3 ounces, about the size of a deck of cards. · Eat 4 to 5 servings of nuts, seeds, and legumes (cooked dried beans, lentils, and split peas) each week. A serving is 1/3 cup of nuts, 2 tablespoons of seeds, or ½ cup of cooked beans or peas. · Limit fats and oils to 2 to 3 servings each day. A serving is 1 teaspoon of vegetable oil or 2 tablespoons of salad dressing. · Limit sweets and added sugars to 5 servings or less a week. A serving is 1 tablespoon jelly or jam, ½ cup sorbet, or 1 cup of lemonade. · Eat less than 2,300 milligrams (mg) of sodium a day. If you limit your sodium to 1,500 mg a day, you can lower your blood pressure even more. Tips for success · Start small. Do not try to make dramatic changes to your diet all at once. You might feel that you are missing out on your favorite foods and then be more likely to not follow the plan. Make small changes, and stick with them. Once those changes become habit, add a few more changes. · Try some of the following: ¨ Make it a goal to eat a fruit or vegetable at every meal and at snacks. This will make it easy to get the recommended amount of fruits and vegetables each day. ¨ Try yogurt topped with fruit and nuts for a snack or healthy dessert. ¨ Add lettuce, tomato, cucumber, and onion to sandwiches. ¨ Combine a ready-made pizza crust with low-fat mozzarella cheese and lots of vegetable toppings. Try using tomatoes, squash, spinach, broccoli, carrots, cauliflower, and onions. ¨ Have a variety of cut-up vegetables with a low-fat dip as an appetizer instead of chips and dip. ¨ Sprinkle sunflower seeds or chopped almonds over salads. Or try adding chopped walnuts or almonds to cooked vegetables. ¨ Try some vegetarian meals using beans and peas. Add garbanzo or kidney beans to salads. Make burritos and tacos with mashed brown beans or black beans. Where can you learn more? Go to http://nahidHollison Technologiesmaureen.info/. Enter S322 in the search box to learn more about \"DASH Diet: Care Instructions. \" Current as of: April 3, 2017 Content Version: 11.3 © 7879-2058 [employer]Rx[employer]arma. Care instructions adapted under license by [employer] (which disclaims liability or warranty for this information). If you have questions about a medical condition or this instruction, always ask your healthcare professional. [address] any warranty or liability for your use of this information. Low Sodium Diet (2,000 Milligram): Care Instructions Your Care Instructions Too much sodium causes your body to hold on to extra water. This can raise your blood pressure and force your heart and kidneys to work harder. In very serious cases, this could cause you to be put in the hospital. It might even be life-threatening. By limiting sodium, you will feel better and lower your risk of serious problems. The most common source of sodium is salt. People get most of the salt in their diet from canned, prepared, and packaged foods. Fast food and restaurant meals also are very high in sodium.  Your doctor will probably limit your sodium to less than 2,000 milligrams (mg) a day. This limit counts all the sodium in prepared and packaged foods and any salt you add to your food. Follow-up care is a key part of your treatment and safety. Be sure to make and go to all appointments, and call your doctor if you are having problems. It's also a good idea to know your test results and keep a list of the medicines you take. How can you care for yourself at home? Read food labels · Read labels on cans and food packages. The labels tell you how much sodium is in each serving. Make sure that you look at the serving size. If you eat more than the serving size, you have eaten more sodium. · Food labels also tell you the Percent Daily Value for sodium. Choose products with low Percent Daily Values for sodium. · Be aware that sodium can come in forms other than salt, including monosodium glutamate (MSG), sodium citrate, and sodium bicarbonate (baking soda). MSG is often added to Asian food. When you eat out, you can sometimes ask for food without MSG or added salt. Buy low-sodium foods · Buy foods that are labeled \"unsalted\" (no salt added), \"sodium-free\" (less than 5 mg of sodium per serving), or \"low-sodium\" (less than 140 mg of sodium per serving). Foods labeled \"reduced-sodium\" and \"light sodium\" may still have too much sodium. Be sure to read the label to see how much sodium you are getting. · Buy fresh vegetables, or frozen vegetables without added sauces. Buy low-sodium versions of canned vegetables, soups, and other canned goods. Prepare low-sodium meals · Cut back on the amount of salt you use in cooking. This will help you adjust to the taste. Do not add salt after cooking. One teaspoon of salt has about 2,300 mg of sodium. · Take the salt shaker off the table. · Flavor your food with garlic, lemon juice, onion, vinegar, herbs, and spices.  Do not use soy sauce, lite soy sauce, steak sauce, onion salt, garlic salt, celery salt, mustard, or ketchup on your food. · Use low-sodium salad dressings, sauces, and ketchup. Or make your own salad dressings and sauces without adding salt. · Use less salt (or none) when recipes call for it. You can often use half the salt a recipe calls for without losing flavor. Other foods such as rice, pasta, and grains do not need added salt. · Rinse canned vegetables, and cook them in fresh water. This removes somebut not allof the salt. · Avoid water that is naturally high in sodium or that has been treated with water softeners, which add sodium. Call your local water company to find out the sodium content of your water supply. If you buy bottled water, read the label and choose a sodium-free brand. Avoid high-sodium foods · Avoid eating: ¨ Smoked, cured, salted, and canned meat, fish, and poultry. ¨ Ham, matias, hot dogs, and luncheon meats. ¨ Regular, hard, and processed cheese and regular peanut butter. ¨ Crackers with salted tops, and other salted snack foods such as pretzels, chips, and salted popcorn. ¨ Frozen prepared meals, unless labeled low-sodium. ¨ Canned and dried soups, broths, and bouillon, unless labeled sodium-free or low-sodium. ¨ Canned vegetables, unless labeled sodium-free or low-sodium. ¨ Western Luh fries, pizza, tacos, and other fast foods. ¨ Pickles, olives, ketchup, and other condiments, especially soy sauce, unless labeled sodium-free or low-sodium. Where can you learn more? Go to http://nahid-maureen.info/. Enter C608 in the search box to learn more about \"Low Sodium Diet (2,000 Milligram): Care Instructions. \" Current as of: July 26, 2016 Content Version: 11.3 © 3506-9038 CureTech. Care instructions adapted under license by [employer] (which disclaims liability or warranty for this information).  If you have questions about a medical condition or this instruction, always ask your healthcare professional. [place] [address] any warranty or liability for your use of this information. Introducing [facility]! Erika Villagomez introduces Lumoid patient portal. Now you can access parts of your medical record, email your doctor's office, and request medication refills online. 1. In your internet browser, go to [email or web address] 2. Click on the First Time User? Click Here link in the Sign In box. You will see the New Member Sign Up page. 3. Enter your Lumoid Access Code exactly as it appears below. You will not need to use this code after youve completed the sign-up process. If you do not sign up before the expiration date, you must request a new code. · Lumoid Access Code: [email or web address] Expires: 10/12/2017  9:15 AM 
 
4. Enter the last four digits of your Social Security Number (xxxx) and Date of Birth (mm/dd/yyyy) as indicated and click Submit. You will be taken to the next sign-up page. 5. Create a Lumoid ID. This will be your Lumoid login ID and cannot be changed, so think of one that is secure and easy to remember. 6. Create a Lumoid password. You can change your password at any time. 7. Enter your Password Reset Question and Answer. This can be used at a later time if you forget your password. 8. Enter your e-mail address. You will receive e-mail notification when new information is available in [address] 9. Click Sign Up. You can now view and download portions of your medical record. 10. Click the Download Summary menu link to download a portable copy of your medical information. If you have questions, please visit the Frequently Asked Questions section of the Lumoid website. Remember, Lumoid is NOT to be used for urgent needs. For medical emergencies, dial 911. Now available from your iPhone and Android! Please provide this summary of care documentation to your next provider. Your primary care clinician is listed as Ryan Cordova. If you have any questions after today's visit, please call [phone].

## 2017-07-14 NOTE — PROGRESS NOTES
Chief Complaint   Patient presents with    Follow-up    Hypertension    Skin Exam     bruise on chest area from fall x 3 weeks ago       Hypertension stable. Diet and Lifestyle: generally follows a low fat low cholesterol diet, generally follows a low sodium diet, sedentary    Home BP Monitoring: is not measured at home. HLD- stable, compliant with medication  IGT/Obesity: gained several pounds over holidays, has gained 6 more pounds since our last visit    Recently had right TKA on 2/8/2016 and left TKA 6/13/2016 with Dr Alexis Flores she is having leg swelling especially since returning to work full duty and standing on feet all day. Swelling does get better with elevation. She does not wear compression stockings because they cut into her calf. Patient is doing well, stable on all medications. Chest pain after fall 3 weeks ago. 0/10 pain now as symptoms are resolving. Tells me she still has some bruising and only gets tightness when stretching her arms. She tells me she was helping her mother and fell over on the fall. Using NSAIDs with moderate relief    Patient Active Problem List   Diagnosis Code    Arthritis M19.90    Knee pain M25.569    Mild chronic anemia D64.9    Impaired glucose tolerance R73.02    Essential hypertension I10    Obesity (BMI 30-39. 9) E66.9    Hyperlipidemia E78.5    Osteoarthritis, knee M17.10    Knee osteoarthritis M17.10       Review of Systems   Complete ROS negative except where noted in HPI    Objective:     Visit Vitals    /80    Pulse 85    Temp 97.8 °F (36.6 °C)    Resp 16    Ht 5' 9\" (1.753 m)    Wt 255 lb (115.7 kg)    SpO2 99%    BMI 37.66 kg/m2     No exam data present    Constitutional: The patient appears well, NAD, Alert & Oriented x 3  Psych: Normal Mood, Normal Behavior  ENT: RUDY, EOMI, no scleral icterus  Lungs: CTAB,  Normal effort , Good air entry, No W/R/R   Chest/Breast: right medial part of breast shows bruising and likely underlying hematoma, left medial breast shows no bruising, ttp along medial breast border and substernal  Cardiovascular:RRR, No M/R/G, S1 and S2 normal  Extremities: 1+  LE edema, feet: warm, good capillary refill    Antionette Ripple was seen today for follow-up, hypertension and skin exam.    Diagnoses and all orders for this visit:    Essential hypertension  -     METABOLIC PANEL, BASIC; Future  -     losartan-hydroCHLOROthiazide (HYZAAR) 100-25 mg per tablet; TAKE 1 TABLET BY MOUTH DAILY. -     amLODIPine (NORVASC) 10 mg tablet; TAKE 1 TABLET BY MOUTH DAILY    Impaired glucose tolerance  -     HEMOGLOBIN A1C WITH EAG; Future    Obesity (BMI 30-39. 9)    Hyperlipidemia, unspecified hyperlipidemia type  -     atorvastatin (LIPITOR) 20 mg tablet; TAKE 1 TABLET BY MOUTH DAILY    Chest wall pain  -     tiZANidine (ZANAFLEX) 4 mg tablet; Take 1 Tab by mouth three (3) times daily as needed. Superficial bruising of chest wall, right, initial encounter    Peripheral edema  -     Comp. Stocking,Knee,Regular,Lrg misc; Use daily and take off at night, 20mmHg     I will recheck chest wall at next visit. Patient will notify if there are any changes. I have discussed the diagnosis with the patient and the intended plan as seen in the above orders. The patient has received an after-visit summary and questions were answered concerning future plans. I have discussed medication side effects and warnings with the patient as well. I have reviewed the plan of care with the patient, accepted their input and they are in agreement with the treatment goals. Patient verbalizes understanding. Follow-up Disposition:  Return in about 3 months (around 10/14/2017), or if symptoms worsen or fail to improve, for HTN.

## 2017-07-14 NOTE — PATIENT INSTRUCTIONS
Body Mass Index: Care Instructions  Your Care Instructions    Body mass index (BMI) can help you see if your weight is raising your risk for health problems. It uses a formula to compare how much you weigh with how tall you are. · A BMI lower than 18.5 is considered underweight. · A BMI between 18.5 and 24.9 is considered healthy. · A BMI between 25 and 29.9 is considered overweight. A BMI of 30 or higher is considered obese. If your BMI is in the normal range, it means that you have a lower risk for weight-related health problems. If your BMI is in the overweight or obese range, you may be at increased risk for weight-related health problems, such as high blood pressure, heart disease, stroke, arthritis or joint pain, and diabetes. If your BMI is in the underweight range, you may be at increased risk for health problems such as fatigue, lower protection (immunity) against illness, muscle loss, bone loss, hair loss, and hormone problems. BMI is just one measure of your risk for weight-related health problems. You may be at higher risk for health problems if you are not active, you eat an unhealthy diet, or you drink too much alcohol or use tobacco products. Follow-up care is a key part of your treatment and safety. Be sure to make and go to all appointments, and call your doctor if you are having problems. It's also a good idea to know your test results and keep a list of the medicines you take. How can you care for yourself at home? · Practice healthy eating habits. This includes eating plenty of fruits, vegetables, whole grains, lean protein, and low-fat dairy. · If your doctor recommends it, get more exercise. Walking is a good choice. Bit by bit, increase the amount you walk every day. Try for at least 30 minutes on most days of the week. · Do not smoke. Smoking can increase your risk for health problems. If you need help quitting, talk to your doctor about stop-smoking programs and medicines. These can increase your chances of quitting for good. · Limit alcohol to 2 drinks a day for men and 1 drink a day for women. Too much alcohol can cause health problems. If you have a BMI higher than 25  · Your doctor may do other tests to check your risk for weight-related health problems. This may include measuring the distance around your waist. A waist measurement of more than 40 inches in men or 35 inches in women can increase the risk of weight-related health problems. · Talk with your doctor about steps you can take to stay healthy or improve your health. You may need to make lifestyle changes to lose weight and stay healthy, such as changing your diet and getting regular exercise. If you have a BMI lower than 18.5  · Your doctor may do other tests to check your risk for health problems. · Talk with your doctor about steps you can take to stay healthy or improve your health. You may need to make lifestyle changes to gain or maintain weight and stay healthy, such as getting more healthy foods in your diet and doing exercises to build muscle. Where can you learn more? Go to http://nahid-maureen.info/. Enter S176 in the search box to learn more about \"Body Mass Index: Care Instructions. \"  Current as of: January 23, 2017  Content Version: 11.3  © 4681-0687 [employer], [employer]. Care instructions adapted under license by [employer] (which disclaims liability or warranty for this information). If you have questions about a medical condition or this instruction, always ask your healthcare professional. [place] [address] any warranty or liability for your use of this information. DASH Diet: Care Instructions  Your Care Instructions  The DASH diet is an eating plan that can help lower your blood pressure. DASH stands for Dietary Approaches to Stop Hypertension. Hypertension is high blood pressure.   The DASH diet focuses on eating foods that are high in calcium, potassium, and magnesium. These nutrients can lower blood pressure. The foods that are highest in these nutrients are fruits, vegetables, low-fat dairy products, nuts, seeds, and legumes. But taking calcium, potassium, and magnesium supplements instead of eating foods that are high in those nutrients does not have the same effect. The DASH diet also includes whole grains, fish, and poultry. The DASH diet is one of several lifestyle changes your doctor may recommend to lower your high blood pressure. Your doctor may also want you to decrease the amount of sodium in your diet. Lowering sodium while following the DASH diet can lower blood pressure even further than just the DASH diet alone. Follow-up care is a key part of your treatment and safety. Be sure to make and go to all appointments, and call your doctor if you are having problems. It's also a good idea to know your test results and keep a list of the medicines you take. How can you care for yourself at home? Following the DASH diet  · Eat 4 to 5 servings of fruit each day. A serving is 1 medium-sized piece of fruit, ½ cup chopped or canned fruit, 1/4 cup dried fruit, or 4 ounces (½ cup) of fruit juice. Choose fruit more often than fruit juice. · Eat 4 to 5 servings of vegetables each day. A serving is 1 cup of lettuce or raw leafy vegetables, ½ cup of chopped or cooked vegetables, or 4 ounces (½ cup) of vegetable juice. Choose vegetables more often than vegetable juice. · Get 2 to 3 servings of low-fat and fat-free dairy each day. A serving is 8 ounces of milk, 1 cup of yogurt, or 1 ½ ounces of cheese. · Eat 6 to 8 servings of grains each day. A serving is 1 slice of bread, 1 ounce of dry cereal, or ½ cup of cooked rice, pasta, or cooked cereal. Try to choose whole-grain products as much as possible. · Limit lean meat, poultry, and fish to 2 servings each day. A serving is 3 ounces, about the size of a deck of cards.   · Eat 4 to 5 servings of nuts, seeds, and legumes (cooked dried beans, lentils, and split peas) each week. A serving is 1/3 cup of nuts, 2 tablespoons of seeds, or ½ cup of cooked beans or peas. · Limit fats and oils to 2 to 3 servings each day. A serving is 1 teaspoon of vegetable oil or 2 tablespoons of salad dressing. · Limit sweets and added sugars to 5 servings or less a week. A serving is 1 tablespoon jelly or jam, ½ cup sorbet, or 1 cup of lemonade. · Eat less than 2,300 milligrams (mg) of sodium a day. If you limit your sodium to 1,500 mg a day, you can lower your blood pressure even more. Tips for success  · Start small. Do not try to make dramatic changes to your diet all at once. You might feel that you are missing out on your favorite foods and then be more likely to not follow the plan. Make small changes, and stick with them. Once those changes become habit, add a few more changes. · Try some of the following:  ¨ Make it a goal to eat a fruit or vegetable at every meal and at snacks. This will make it easy to get the recommended amount of fruits and vegetables each day. ¨ Try yogurt topped with fruit and nuts for a snack or healthy dessert. ¨ Add lettuce, tomato, cucumber, and onion to sandwiches. ¨ Combine a ready-made pizza crust with low-fat mozzarella cheese and lots of vegetable toppings. Try using tomatoes, squash, spinach, broccoli, carrots, cauliflower, and onions. ¨ Have a variety of cut-up vegetables with a low-fat dip as an appetizer instead of chips and dip. ¨ Sprinkle sunflower seeds or chopped almonds over salads. Or try adding chopped walnuts or almonds to cooked vegetables. ¨ Try some vegetarian meals using beans and peas. Add garbanzo or kidney beans to salads. Make burritos and tacos with mashed brown beans or black beans. Where can you learn more? Go to http://nahid-maureen.info/. Enter M467 in the search box to learn more about \"DASH Diet: Care Instructions. \"  Current as of: April 3, 2017  Content Version: 11.3  © 7302-6509 [employer]. Care instructions adapted under license by [employer] (which disclaims liability or warranty for this information). If you have questions about a medical condition or this instruction, always ask your healthcare professional. [address] any warranty or liability for your use of this information. Low Sodium Diet (2,000 Milligram): Care Instructions  Your Care Instructions  Too much sodium causes your body to hold on to extra water. This can raise your blood pressure and force your heart and kidneys to work harder. In very serious cases, this could cause you to be put in the hospital. It might even be life-threatening. By limiting sodium, you will feel better and lower your risk of serious problems. The most common source of sodium is salt. People get most of the salt in their diet from canned, prepared, and packaged foods. Fast food and restaurant meals also are very high in sodium. Your doctor will probably limit your sodium to less than 2,000 milligrams (mg) a day. This limit counts all the sodium in prepared and packaged foods and any salt you add to your food. Follow-up care is a key part of your treatment and safety. Be sure to make and go to all appointments, and call your doctor if you are having problems. It's also a good idea to know your test results and keep a list of the medicines you take. How can you care for yourself at home? Read food labels  · Read labels on cans and food packages. The labels tell you how much sodium is in each serving. Make sure that you look at the serving size. If you eat more than the serving size, you have eaten more sodium. · Food labels also tell you the Percent Daily Value for sodium. Choose products with low Percent Daily Values for sodium.   · Be aware that sodium can come in forms other than salt, including monosodium glutamate (MSG), sodium citrate, and sodium bicarbonate (baking soda). MSG is often added to Asian food. When you eat out, you can sometimes ask for food without MSG or added salt. Buy low-sodium foods  · Buy foods that are labeled \"unsalted\" (no salt added), \"sodium-free\" (less than 5 mg of sodium per serving), or \"low-sodium\" (less than 140 mg of sodium per serving). Foods labeled \"reduced-sodium\" and \"light sodium\" may still have too much sodium. Be sure to read the label to see how much sodium you are getting. · Buy fresh vegetables, or frozen vegetables without added sauces. Buy low-sodium versions of canned vegetables, soups, and other canned goods. Prepare low-sodium meals  · Cut back on the amount of salt you use in cooking. This will help you adjust to the taste. Do not add salt after cooking. One teaspoon of salt has about 2,300 mg of sodium. · Take the salt shaker off the table. · Flavor your food with garlic, lemon juice, onion, vinegar, herbs, and spices. Do not use soy sauce, lite soy sauce, steak sauce, onion salt, garlic salt, celery salt, mustard, or ketchup on your food. · Use low-sodium salad dressings, sauces, and ketchup. Or make your own salad dressings and sauces without adding salt. · Use less salt (or none) when recipes call for it. You can often use half the salt a recipe calls for without losing flavor. Other foods such as rice, pasta, and grains do not need added salt. · Rinse canned vegetables, and cook them in fresh water. This removes somebut not allof the salt. · Avoid water that is naturally high in sodium or that has been treated with water softeners, which add sodium. Call your local water company to find out the sodium content of your water supply. If you buy bottled water, read the label and choose a sodium-free brand. Avoid high-sodium foods  · Avoid eating:  ¨ Smoked, cured, salted, and canned meat, fish, and poultry. ¨ Ham, matias, hot dogs, and luncheon meats.   ¨ Regular, hard, and processed cheese and regular peanut butter. ¨ Crackers with salted tops, and other salted snack foods such as pretzels, chips, and salted popcorn. ¨ Frozen prepared meals, unless labeled low-sodium. ¨ Canned and dried soups, broths, and bouillon, unless labeled sodium-free or low-sodium. ¨ Canned vegetables, unless labeled sodium-free or low-sodium. ¨ [place] [employer] fries, pizza, tacos, and other fast foods. ¨ Pickles, olives, ketchup, and other condiments, especially soy sauce, unless labeled sodium-free or low-sodium. Where can you learn more? Go to http://nahid-maureen.info/. Enter Q105 in the search box to learn more about \"Low Sodium Diet (2,000 Milligram): Care Instructions. \"  Current as of: July 26, 2016  Content Version: 11.3  © 5025-6313 [employer], [employer]. Care instructions adapted under license by [employer] (which disclaims liability or warranty for this information). If you have questions about a medical condition or this instruction, always ask your healthcare professional. [place] [address] any warranty or liability for your use of this information.

## 2017-08-12 DIAGNOSIS — R07.89 CHEST WALL PAIN: ICD-10-CM

## 2017-08-14 RX ORDER — TIZANIDINE 4 MG/1
TABLET ORAL
Qty: 90 TAB | Refills: 0 | Status: SHIPPED | OUTPATIENT
Start: 2017-08-14 | End: 2017-09-12 | Stop reason: SDUPTHER

## 2017-09-12 DIAGNOSIS — R07.89 CHEST WALL PAIN: ICD-10-CM

## 2017-09-13 RX ORDER — TIZANIDINE 4 MG/1
TABLET ORAL
Qty: 90 TAB | Refills: 0 | Status: SHIPPED | OUTPATIENT
Start: 2017-09-13 | End: 2018-08-17

## 2017-10-05 DIAGNOSIS — D64.9 MILD CHRONIC ANEMIA: ICD-10-CM

## 2017-10-05 RX ORDER — LANOLIN ALCOHOL/MO/W.PET/CERES
CREAM (GRAM) TOPICAL
Qty: 60 TAB | Refills: 3 | Status: SHIPPED | COMMUNITY
Start: 2017-10-05 | End: 2017-11-13 | Stop reason: SDUPTHER

## 2017-10-31 ENCOUNTER — HOSPITAL ENCOUNTER (OUTPATIENT)
Dept: LAB | Age: 62
Discharge: HOME | End: 2017-10-31

## 2017-10-31 PROCEDURE — 99001 SPECIMEN HANDLING PT-LAB: CPT | Performed by: ORTHOPAEDIC SURGERY

## 2017-11-11 ENCOUNTER — HOSPITAL ENCOUNTER (OUTPATIENT)
Dept: LAB | Age: 62
Discharge: HOME | End: 2017-11-11

## 2017-11-11 PROCEDURE — 99001 SPECIMEN HANDLING PT-LAB: CPT | Performed by: FAMILY MEDICINE

## 2017-11-12 LAB
BUN SERPL-MCNC: 8 MG/DL (ref 8–27)
BUN/CREAT SERPL: 13 (ref 12–28)
CALCIUM SERPL-MCNC: 10 MG/DL (ref 8.7–10.3)
CHLORIDE SERPL-SCNC: 97 MMOL/L (ref 96–106)
CO2 SERPL-SCNC: 26 MMOL/L (ref 18–29)
CREAT SERPL-MCNC: 0.61 MG/DL (ref 0.57–1)
EST. AVERAGE GLUCOSE BLD GHB EST-MCNC: 131 MG/DL
GFR SERPLBLD CREATININE-BSD FMLA CKD-EPI: 113 ML/MIN/1.73
GFR SERPLBLD CREATININE-BSD FMLA CKD-EPI: 98 ML/MIN/1.73
GLUCOSE SERPL-MCNC: 122 MG/DL (ref 65–99)
HBA1C MFR BLD: 6.2 % (ref 4.8–5.6)
POTASSIUM SERPL-SCNC: 4.1 MMOL/L (ref 3.5–5.2)
SODIUM SERPL-SCNC: 140 MMOL/L (ref 134–144)

## 2017-11-13 ENCOUNTER — OFFICE VISIT (OUTPATIENT)
Dept: FAMILY MEDICINE CLINIC | Facility: CLINIC | Age: 62
End: 2017-11-13

## 2017-11-13 VITALS
RESPIRATION RATE: 18 BRPM | TEMPERATURE: 98 F | SYSTOLIC BLOOD PRESSURE: 133 MMHG | HEART RATE: 90 BPM | HEIGHT: 69 IN | OXYGEN SATURATION: 96 % | DIASTOLIC BLOOD PRESSURE: 70 MMHG | WEIGHT: 262 LBS | BODY MASS INDEX: 38.8 KG/M2

## 2017-11-13 DIAGNOSIS — D64.9 MILD CHRONIC ANEMIA: ICD-10-CM

## 2017-11-13 DIAGNOSIS — I10 ESSENTIAL HYPERTENSION: ICD-10-CM

## 2017-11-13 DIAGNOSIS — Z23 ENCOUNTER FOR IMMUNIZATION: Primary | ICD-10-CM

## 2017-11-13 DIAGNOSIS — I10 HTN, GOAL BELOW 130/80: ICD-10-CM

## 2017-11-13 DIAGNOSIS — J30.9 ALLERGIC RHINITIS, UNSPECIFIED CHRONICITY, UNSPECIFIED SEASONALITY, UNSPECIFIED TRIGGER: ICD-10-CM

## 2017-11-13 DIAGNOSIS — E78.5 HYPERLIPIDEMIA, UNSPECIFIED HYPERLIPIDEMIA TYPE: ICD-10-CM

## 2017-11-13 DIAGNOSIS — F32.A DEPRESSION, UNSPECIFIED DEPRESSION TYPE: ICD-10-CM

## 2017-11-13 DIAGNOSIS — R73.02 IMPAIRED GLUCOSE TOLERANCE: ICD-10-CM

## 2017-11-13 LAB — HBA1C MFR BLD HPLC: 6 %

## 2017-11-13 RX ORDER — ATORVASTATIN CALCIUM 20 MG/1
TABLET, FILM COATED ORAL
Qty: 30 TAB | Refills: 3 | Status: SHIPPED | OUTPATIENT
Start: 2017-11-13 | End: 2018-03-17 | Stop reason: SDUPTHER

## 2017-11-13 RX ORDER — CITALOPRAM 20 MG/1
20 TABLET, FILM COATED ORAL DAILY
Qty: 30 TAB | Refills: 3 | Status: SHIPPED | OUTPATIENT
Start: 2017-11-13 | End: 2017-12-29

## 2017-11-13 RX ORDER — MINERAL OIL
180 ENEMA (ML) RECTAL DAILY
Qty: 30 TAB | Refills: 3 | Status: SHIPPED | OUTPATIENT
Start: 2017-11-13 | End: 2018-05-19 | Stop reason: SDUPTHER

## 2017-11-13 RX ORDER — LOSARTAN POTASSIUM AND HYDROCHLOROTHIAZIDE 25; 100 MG/1; MG/1
TABLET ORAL
Qty: 30 TAB | Refills: 3 | Status: SHIPPED | OUTPATIENT
Start: 2017-11-13 | End: 2018-03-17 | Stop reason: SDUPTHER

## 2017-11-13 RX ORDER — LANOLIN ALCOHOL/MO/W.PET/CERES
CREAM (GRAM) TOPICAL
Qty: 60 TAB | Refills: 3 | Status: SHIPPED | OUTPATIENT
Start: 2017-11-13 | End: 2018-05-22 | Stop reason: SDUPTHER

## 2017-11-13 RX ORDER — AMLODIPINE BESYLATE 10 MG/1
TABLET ORAL
Qty: 30 TAB | Refills: 3 | Status: SHIPPED | OUTPATIENT
Start: 2017-11-13 | End: 2018-03-17 | Stop reason: SDUPTHER

## 2017-11-13 NOTE — PATIENT INSTRUCTIONS
Low Sodium Diet (2,000 Milligram): Care Instructions  Your Care Instructions    Too much sodium causes your body to hold on to extra water. This can raise your blood pressure and force your heart and kidneys to work harder. In very serious cases, this could cause you to be put in the hospital. It might even be life-threatening. By limiting sodium, you will feel better and lower your risk of serious problems. The most common source of sodium is salt. People get most of the salt in their diet from canned, prepared, and packaged foods. Fast food and restaurant meals also are very high in sodium. Your doctor will probably limit your sodium to less than 2,000 milligrams (mg) a day. This limit counts all the sodium in prepared and packaged foods and any salt you add to your food. Follow-up care is a key part of your treatment and safety. Be sure to make and go to all appointments, and call your doctor if you are having problems. It's also a good idea to know your test results and keep a list of the medicines you take. How can you care for yourself at home? Read food labels  · Read labels on cans and food packages. The labels tell you how much sodium is in each serving. Make sure that you look at the serving size. If you eat more than the serving size, you have eaten more sodium. · Food labels also tell you the Percent Daily Value for sodium. Choose products with low Percent Daily Values for sodium. · Be aware that sodium can come in forms other than salt, including monosodium glutamate (MSG), sodium citrate, and sodium bicarbonate (baking soda). MSG is often added to Asian food. When you eat out, you can sometimes ask for food without MSG or added salt. Buy low-sodium foods  · Buy foods that are labeled \"unsalted\" (no salt added), \"sodium-free\" (less than 5 mg of sodium per serving), or \"low-sodium\" (less than 140 mg of sodium per serving).  Foods labeled \"reduced-sodium\" and \"light sodium\" may still have too much sodium. Be sure to read the label to see how much sodium you are getting. · Buy fresh vegetables, or frozen vegetables without added sauces. Buy low-sodium versions of canned vegetables, soups, and other canned goods. Prepare low-sodium meals  · Cut back on the amount of salt you use in cooking. This will help you adjust to the taste. Do not add salt after cooking. One teaspoon of salt has about 2,300 mg of sodium. · Take the salt shaker off the table. · Flavor your food with garlic, lemon juice, onion, vinegar, herbs, and spices. Do not use soy sauce, lite soy sauce, steak sauce, onion salt, garlic salt, celery salt, mustard, or ketchup on your food. · Use low-sodium salad dressings, sauces, and ketchup. Or make your own salad dressings and sauces without adding salt. · Use less salt (or none) when recipes call for it. You can often use half the salt a recipe calls for without losing flavor. Other foods such as rice, pasta, and grains do not need added salt. · Rinse canned vegetables, and cook them in fresh water. This removes some-but not all-of the salt. · Avoid water that is naturally high in sodium or that has been treated with water softeners, which add sodium. Call your local water company to find out the sodium content of your water supply. If you buy bottled water, read the label and choose a sodium-free brand. Avoid high-sodium foods  · Avoid eating:  ¨ Smoked, cured, salted, and canned meat, fish, and poultry. ¨ Ham, matias, hot dogs, and luncheon meats. ¨ Regular, hard, and processed cheese and regular peanut butter. ¨ Crackers with salted tops, and other salted snack foods such as pretzels, chips, and salted popcorn. ¨ Frozen prepared meals, unless labeled low-sodium. ¨ Canned and dried soups, broths, and bouillon, unless labeled sodium-free or low-sodium. ¨ Canned vegetables, unless labeled sodium-free or low-sodium. ¨ [place] fries, pizza, tacos, and other fast foods.   [place], olives, ketchup, and other condiments, especially soy sauce, unless labeled sodium-free or low-sodium. Where can you learn more? Go to http://nahid-maureen.info/. Enter F681 in the search box to learn more about \"Low Sodium Diet (2,000 Milligram): Care Instructions. \"  Current as of: May 12, 2017  Content Version: 11.4  © 2444-4136 [employer]. Care instructions adapted under license by [employer] (which disclaims liability or warranty for this information). If you have questions about a medical condition or this instruction, always ask your healthcare professional. [address] any warranty or liability for your use of this information. Learning About High Cholesterol  What is high cholesterol? Cholesterol is a type of fat in your blood. It is needed for many body functions, such as making new cells. Cholesterol is made by your body. It also comes from food you eat. If you have too much cholesterol, it starts to build up in your arteries. This is called hardening of the arteries, or atherosclerosis. High cholesterol raises your risk of a heart attack and stroke. There are different types of cholesterol. LDL is the \"bad\" cholesterol. High LDL can raise your risk for heart disease, heart attack, and stroke. HDL is the \"good\" cholesterol. High HDL is linked with a lower risk for heart disease, heart attack, and stroke. Your cholesterol levels help your doctor find out your risk for having a heart attack or stroke. How can you prevent high cholesterol? A heart-healthy lifestyle can help you prevent high cholesterol. This lifestyle helps lower your risk for a heart attack and stroke. · Eat heart-healthy foods. ¨ Eat fruits, vegetables, whole grains (like oatmeal), dried beans and peas, nuts and seeds, soy products (like tofu), and fat-free or low-fat dairy products.   ¨ Replace butter, margarine, and hydrogenated or partially hydrogenated oils with olive and canola oils. (Canola oil margarine without trans fat is fine.)  ¨ Replace red meat with fish, poultry, and soy protein (like tofu). ¨ Limit processed and packaged foods like chips, crackers, and cookies. · Be active. Exercise can improve your cholesterol level. Get at least 30 minutes of exercise on most days of the week. Walking is a good choice. You also may want to do other activities, such as running, swimming, cycling, or playing tennis or team sports. · Stay at a healthy weight. Lose weight if you need to. · Don't smoke. If you need help quitting, talk to your doctor about stop-smoking programs and medicines. These can increase your chances of quitting for good. How is high cholesterol treated? The goal of treatment is to reduce your chances of having a heart attack or stroke. The goal is not to lower your cholesterol numbers only. · You may make lifestyle changes, such as eating healthy foods, not smoking, losing weight, and being more active. · You may have to take medicine. Follow-up care is a key part of your treatment and safety. Be sure to make and go to all appointments, and call your doctor if you are having problems. It's also a good idea to know your test results and keep a list of the medicines you take. Where can you learn more? Go to http://nahid-maureen.info/. Enter F484 in the search box to learn more about \"Learning About High Cholesterol. \"  Current as of: September 21, 2016  Content Version: 11.4  © 6006-9902 Healthwise, [employer]. Care instructions adapted under license by [employer] (which disclaims liability or warranty for this information). If you have questions about a medical condition or this instruction, always ask your healthcare professional. [place] [address] any warranty or liability for your use of this information.        Recovering From Depression: Care Instructions  Your Care Instructions    Taking good care of yourself is important as you recover from depression. In time, your symptoms will fade as your treatment takes hold. Do not give up. Instead, focus your energy on getting better. Your mood will improve. It just takes some time. Focus on things that can help you feel better, such as being with friends and family, eating well, and getting enough rest. But take things slowly. Do not do too much too soon. You will begin to feel better gradually. Follow-up care is a key part of your treatment and safety. Be sure to make and go to all appointments, and call your doctor if you are having problems. It's also a good idea to know your test results and keep a list of the medicines you take. How can you care for yourself at home? Be realistic  · If you have a large task to do, break it up into smaller steps you can handle, and just do what you can. · You may want to put off important decisions until your depression has lifted. If you have plans that will have a major impact on your life, such as marriage, divorce, or a job change, try to wait a bit. Talk it over with friends and loved ones who can help you look at the overall picture first.  · Reaching out to people for help is important. Do not isolate yourself. Let your family and friends help you. Find someone you can trust and confide in, and talk to that person. · Be patient, and be kind to yourself. Remember that depression is not your fault and is not something you can overcome with willpower alone. Treatment is necessary for depression, just like for any other illness. Feeling better takes time, and your mood will improve little by little. Stay active  · Stay busy and get outside. Take a walk, or try some other light exercise. · Talk with your doctor about an exercise program. Exercise can help with mild depression. · Go to a movie or concert. Take part in a Hol[employer] activity or other social gathering. Go to a ball game.   · Ask a friend to have dinner with you. Take care of yourself  · Eat a balanced diet with plenty of fresh fruits and vegetables, whole grains, and lean protein. If you have lost your appetite, eat small snacks rather than large meals. · Avoid drinking alcohol or using illegal drugs. Do not take medicines that have not been prescribed for you. They may interfere with medicines you may be taking for depression, or they may make your depression worse. · Take your medicines exactly as they are prescribed. You may start to feel better within 1 to 3 weeks of taking antidepressant medicine. But it can take as many as 6 to 8 weeks to see more improvement. If you have questions or concerns about your medicines, or if you do not notice any improvement by 3 weeks, talk to your doctor. · If you have any side effects from your medicine, tell your doctor. Antidepressants can make you feel tired, dizzy, or nervous. Some people have dry mouth, constipation, headaches, sexual problems, or diarrhea. Many of these side effects are mild and will go away on their own after you have been taking the medicine for a few weeks. Some may last longer. Talk to your doctor if side effects are bothering you too much. You might be able to try a different medicine. · Get enough sleep. If you have problems sleeping:  ¨ Go to bed at the same time every night, and get up at the same time every morning. ¨ Keep your bedroom dark and quiet. ¨ Do not exercise after 5:00 p.m. ¨ Avoid drinks with caffeine after 5:00 p.m. · Avoid sleeping pills unless they are prescribed by the doctor treating your depression. Sleeping pills may make you groggy during the day, and they may interact with other medicine you are taking. · If you have any other illnesses, such as diabetes, heart disease, or high blood pressure, make sure to continue with your treatment.  Tell your doctor about all of the medicines you take, including those with or without a prescription. · Keep the numbers for these national suicide hotlines: 4-915-344-TALK (3-712.885.8703) and 2-665-YXCMSYF (1-796.542.7516). If you or someone you know talks about suicide or feeling hopeless, get help right away. When should you call for help? Call 911 anytime you think you may need emergency care. For example, call if:  ? · You feel like hurting yourself or someone else. ? · Someone you know has depression and is about to attempt or is attempting suicide. ?Call your doctor now or seek immediate medical care if:  ? · You hear voices. ? · Someone you know has depression and:  ¨ Starts to give away his or her possessions. ¨ Uses illegal drugs or drinks alcohol heavily. ¨ Talks or writes about death, including writing suicide notes or talking about guns, knives, or pills. ¨ Starts to spend a lot of time alone. ¨ Acts very aggressively or suddenly appears calm. ? Watch closely for changes in your health, and be sure to contact your doctor if:  ? · You do not get better as expected. Where can you learn more? Go to http://nahid-maureen.info/. Enter Y053 in the search box to learn more about \"Recovering From Depression: Care Instructions. \"  Current as of: May 12, 2017  Content Version: 11.4  © 9265-5243 [employer]. Care instructions adapted under license by [employer] (which disclaims liability or warranty for this information). If you have questions about a medical condition or this instruction, always ask your healthcare professional. [place] [address] any warranty or liability for your use of this information.

## 2017-11-13 NOTE — MR AVS SNAPSHOT
Visit Information Date & Time Provider Department Dept. Phone Encounter #  
 11/13/2017  8:30 AM Gerre Najjar, NP [employer] [phone] [id number] Follow-up Instructions Return in about 6 weeks (around 12/25/2017), or if symptoms worsen or fail to improve, for depression. Ramona Hemphill Upcoming Health Maintenance Date Due FOBT Q 1 YEAR AGE 50-75 7/23/2006 ZOSTER VACCINE AGE 60> 5/23/2016 Influenza Age 5 to Adult 8/1/2017 BREAST CANCER SCRN MAMMOGRAM 7/26/2018 DTaP/Tdap/Td series (2 - Td) 10/31/2022 Allergies as of 11/13/2017  Review Complete On: 11/13/2017 By: Christy Bhandari LPN No Known Allergies Current Immunizations  Reviewed on 10/12/2016 Name Date Influenza Vaccine (Quad) PF 11/13/2017, 10/12/2016 10:52 AM, 2/1/2016 10:37 AM  
 Influenza Vaccine PF 12/11/2013 TDAP Vaccine 10/31/2012 12:56 AM  
  
 Not reviewed this visit You Were Diagnosed With   
  
 Codes Comments Encounter for immunization    -  Primary ICD-10-CM: N90 ICD-9-CM: V03.89 Impaired glucose tolerance     ICD-10-CM: R73.02 
ICD-9-CM: 790.22   
 HTN, goal below 130/80     ICD-10-CM: I10 
ICD-9-CM: 401.9 Essential hypertension     ICD-10-CM: I10 
ICD-9-CM: 401.9 Hyperlipidemia, unspecified hyperlipidemia type     ICD-10-CM: E78.5 ICD-9-CM: 272.4 Mild chronic anemia     ICD-10-CM: D64.9 ICD-9-CM: 263. 9 Allergic rhinitis, unspecified chronicity, unspecified seasonality, unspecified trigger     ICD-10-CM: J30.9 ICD-9-CM: 477.9 Depression, unspecified depression type     ICD-10-CM: F32.9 ICD-9-CM: 692 Vitals BP Pulse Temp Resp Height(growth percentile) Weight(growth percentile) 133/70 (BP 1 Location: Right arm, BP Patient Position: Sitting) 90 98 °F (36.7 °C) (Oral) 18 5' 9\" (1.753 m) 262 lb (118.8 kg) SpO2 BMI OB Status Smoking Status 96% 38.69 kg/m2 Hysterectomy Never Smoker Vitals History BMI and BSA Data Body Mass Index Body Surface Area  
 38.69 kg/m 2 2.4 m 2 Preferred Pharmacy Pharmacy Name Phone [facility]Y #[id number] Alejandro  [phone] Your Updated Medication List  
  
   
This list is accurate as of: 11/13/17  9:35 AM.  Always use your most recent med list. amLODIPine 10 mg tablet Commonly known as:  Zamora Heber TAKE 1 TABLET BY MOUTH DAILY  
  
 atorvastatin 20 mg tablet Commonly known as:  LIPITOR  
TAKE 1 TABLET BY MOUTH DAILY  
  
 citalopram 20 mg tablet Commonly known as:  Sparkle Daysi Take 1 Tab by mouth daily. Comp. [address] Use daily and take off at night, 20mmHg  
  
 ferrous sulfate 325 mg (65 mg iron) tablet TAKE 1 TABLET BY MOUTH TWICE A DAY WITH MEALS  
  
 fexofenadine 180 mg tablet Commonly known as:  Seretha Lever Take 1 Tab by mouth daily. losartan-hydroCHLOROthiazide 100-25 mg per tablet Commonly known as:  HYZAAR  
TAKE 1 TABLET BY MOUTH DAILY. Magnesium Oxide 500 mg Cap Take  by mouth daily. morinda citrifolia fruit 250 mg Cap Take 1 Cap by mouth daily. ONE-A-DAY WOMEN'S 50+ PO Take  by mouth daily. polyethylene glycol 17 gram/dose powder Commonly known as:  Bruce Owens Take as directed by office  
  
 tiZANidine 4 mg tablet Commonly known as:  Danielle Best TAKE 1 TAB BY MOUTH THREE (3) TIMES DAILY AS NEEDED. Prescriptions Sent to Pharmacy Refills  
 amLODIPine (NORVASC) 10 mg tablet 3 Sig: TAKE 1 TABLET BY MOUTH DAILY Class: Normal  
 Pharmacy: [facility]/pharmacy [address] Ph #: [phone]  
 atorvastatin (LIPITOR) 20 mg tablet 3 Sig: TAKE 1 TABLET BY MOUTH DAILY Class: Normal  
 Pharmacy: [facility]/pharmacy [address] Ph #: [phone]  
 ferrous sulfate 325 mg (65 mg iron) tablet 3  Sig: TAKE 1 TABLET BY MOUTH TWICE A DAY WITH MEALS  
 Class: Normal  
 Pharmacy: [facility]/pharmacy [address] Ph #: [phone]  
 fexofenadine (ALLEGRA) 180 mg tablet 3 Sig: Take 1 Tab by mouth daily. Class: Normal  
 Pharmacy: [address] [place] [address] Ph #: [phone] Route: Oral  
 losartan-hydroCHLOROthiazide (HYZAAR) 100-25 mg per tablet 3 Sig: TAKE 1 TABLET BY MOUTH DAILY. Class: Normal  
 Pharmacy: [facility]/pharmacy [address] Ph #: [phone]  
 citalopram (CELEXA) 20 mg tablet 3 Sig: Take 1 Tab by mouth daily. Class: Normal  
 Pharmacy: [address] [place] [address] Ph #: [phone] Route: Oral  
  
We Performed the Following AMB POC HEMOGLOBIN A1C [91877 CPT(R)] INFLUENZA VIRUS VAC QUAD,SPLIT,PRESV FREE SYRINGE IM [id number] CPT(R)] Follow-up Instructions Return in about 6 weeks (around 12/25/2017), or if symptoms worsen or fail to improve, for depression. .  
  
  
Patient Instructions Low Sodium Diet (2,000 Milligram): Care Instructions Your Care Instructions Too much sodium causes your body to hold on to extra water. This can raise your blood pressure and force your heart and kidneys to work harder. In very serious cases, this could cause you to be put in the hospital. It might even be life-threatening. By limiting sodium, you will feel better and lower your risk of serious problems. The most common source of sodium is salt. People get most of the salt in their diet from canned, prepared, and packaged foods. Fast food and restaurant meals also are very high in sodium. Your doctor will probably limit your sodium to less than 2,000 milligrams (mg) a day. This limit counts all the sodium in prepared and packaged foods and any salt you add to your food. Follow-up care is a key part of your treatment and safety.  Be sure to make and go to all appointments, and call your doctor if you are having problems. It's also a good idea to know your test results and keep a list of the medicines you take. How can you care for yourself at home? Read food labels · Read labels on cans and food packages. The labels tell you how much sodium is in each serving. Make sure that you look at the serving size. If you eat more than the serving size, you have eaten more sodium. · Food labels also tell you the Percent Daily Value for sodium. Choose products with low Percent Daily Values for sodium. · Be aware that sodium can come in forms other than salt, including monosodium glutamate (MSG), sodium citrate, and sodium bicarbonate (baking soda). MSG is often added to Asian food. When you eat out, you can sometimes ask for food without MSG or added salt. Buy low-sodium foods · Buy foods that are labeled \"unsalted\" (no salt added), \"sodium-free\" (less than 5 mg of sodium per serving), or \"low-sodium\" (less than 140 mg of sodium per serving). Foods labeled \"reduced-sodium\" and \"light sodium\" may still have too much sodium. Be sure to read the label to see how much sodium you are getting. · Buy fresh vegetables, or frozen vegetables without added sauces. Buy low-sodium versions of canned vegetables, soups, and other canned goods. Prepare low-sodium meals · Cut back on the amount of salt you use in cooking. This will help you adjust to the taste. Do not add salt after cooking. One teaspoon of salt has about 2,300 mg of sodium. · Take the salt shaker off the table. · Flavor your food with garlic, lemon juice, onion, vinegar, herbs, and spices. Do not use soy sauce, lite soy sauce, steak sauce, onion salt, garlic salt, celery salt, mustard, or ketchup on your food. · Use low-sodium salad dressings, sauces, and ketchup. Or make your own salad dressings and sauces without adding salt. · Use less salt (or none) when recipes call for it.  You can often use half the salt a recipe calls for without losing flavor. Other foods such as rice, pasta, and grains do not need added salt. · Rinse canned vegetables, and cook them in fresh water. This removes some-but not all-of the salt. · Avoid water that is naturally high in sodium or that has been treated with water softeners, which add sodium. Call your local water company to find out the sodium content of your water supply. If you buy bottled water, read the label and choose a sodium-free brand. Avoid high-sodium foods · Avoid eating: ¨ Smoked, cured, salted, and canned meat, fish, and poultry. ¨ Ham, matias, hot dogs, and luncheon meats. ¨ Regular, hard, and processed cheese and regular peanut butter. ¨ Crackers with salted tops, and other salted snack foods such as pretzels, chips, and salted popcorn. ¨ Frozen prepared meals, unless labeled low-sodium. ¨ Canned and dried soups, broths, and bouillon, unless labeled sodium-free or low-sodium. ¨ Canned vegetables, unless labeled sodium-free or low-sodium. ¨ [place] fries, pizza, tacos, and other fast foods. ¨ Pickles, olives, ketchup, and other condiments, especially soy sauce, unless labeled sodium-free or low-sodium. Where can you learn more? Go to http://nahid-maureen.info/. Enter Z700 in the search box to learn more about \"Low Sodium Diet (2,000 Milligram): Care Instructions. \" Current as of: May 12, 2017 Content Version: 11.4 © 2783-9648 [employer]. Care instructions adapted under license by [employer] (which disclaims liability or warranty for this information). If you have questions about a medical condition or this instruction, always ask your healthcare professional. [place] [address] any warranty or liability for your use of this information. Learning About High Cholesterol What is high cholesterol? Cholesterol is a type of fat in your blood.  It is needed for many body functions, such as making new cells. Cholesterol is made by your body. It also comes from food you eat. If you have too much cholesterol, it starts to build up in your arteries. This is called hardening of the arteries, or atherosclerosis. High cholesterol raises your risk of a heart attack and stroke. There are different types of cholesterol. LDL is the \"bad\" cholesterol. High LDL can raise your risk for heart disease, heart attack, and stroke. HDL is the \"good\" cholesterol. High HDL is linked with a lower risk for heart disease, heart attack, and stroke. Your cholesterol levels help your doctor find out your risk for having a heart attack or stroke. How can you prevent high cholesterol? A heart-healthy lifestyle can help you prevent high cholesterol. This lifestyle helps lower your risk for a heart attack and stroke. · Eat heart-healthy foods. ¨ Eat fruits, vegetables, whole grains (like oatmeal), dried beans and peas, nuts and seeds, soy products (like tofu), and fat-free or low-fat dairy products. ¨ Replace butter, margarine, and hydrogenated or partially hydrogenated oils with olive and canola oils. (Canola oil margarine without trans fat is fine.) ¨ Replace red meat with fish, poultry, and soy protein (like tofu). ¨ Limit processed and packaged foods like chips, crackers, and cookies. · Be active. Exercise can improve your cholesterol level. Get at least 30 minutes of exercise on most days of the week. Walking is a good choice. You also may want to do other activities, such as running, swimming, cycling, or playing tennis or team sports. · Stay at a healthy weight. Lose weight if you need to. · Don't smoke. If you need help quitting, talk to your doctor about stop-smoking programs and medicines. These can increase your chances of quitting for good. How is high cholesterol treated?  
The goal of treatment is to reduce your chances of having a heart attack or stroke. The goal is not to lower your cholesterol numbers only. · You may make lifestyle changes, such as eating healthy foods, not smoking, losing weight, and being more active. · You may have to take medicine. Follow-up care is a key part of your treatment and safety. Be sure to make and go to all appointments, and call your doctor if you are having problems. It's also a good idea to know your test results and keep a list of the medicines you take. Where can you learn more? Go to http://nahid-maureen.info/. Enter W587 in the search box to learn more about \"Learning About High Cholesterol. \" Current as of: September 21, 2016 Content Version: 11.4 © 9338-3910 [employer]medx. Care instructions adapted under license by [employer]ase (which disclaims liability or warranty for this information). If you have questions about a medical condition or this instruction, always ask your healthcare professional. [place] [address] any warranty or liability for your use of this information. Recovering From Depression: Care Instructions Your Care Instructions Taking good care of yourself is important as you recover from depression. In time, your symptoms will fade as your treatment takes hold. Do not give up. Instead, focus your energy on getting better. Your mood will improve. It just takes some time. Focus on things that can help you feel better, such as being with friends and family, eating well, and getting enough rest. But take things slowly. Do not do too much too soon. You will begin to feel better gradually. Follow-up care is a key part of your treatment and safety. Be sure to make and go to all appointments, and call your doctor if you are having problems. It's also a good idea to know your test results and keep a list of the medicines you take. How can you care for yourself at home? Be realistic · If you have a large task to do, break it up into smaller steps you can handle, and just do what you can. · You may want to put off important decisions until your depression has lifted. If you have plans that will have a major impact on your life, such as marriage, divorce, or a job change, try to wait a bit. Talk it over with friends and loved ones who can help you look at the overall picture first. 
· Reaching out to people for help is important. Do not isolate yourself. Let your family and friends help you. Find someone you can trust and confide in, and talk to that person. · Be patient, and be kind to yourself. Remember that depression is not your fault and is not something you can overcome with willpower alone. Treatment is necessary for depression, just like for any other illness. Feeling better takes time, and your mood will improve little by little. Stay active · Stay busy and get outside. Take a walk, or try some other light exercise. · Talk with your doctor about an exercise program. Exercise can help with mild depression. · Go to a movie or concert. Take part in a Buddhism activity or other social gathering. Go to a ball game. · Ask a friend to have dinner with you. Take care of yourself · Eat a balanced diet with plenty of fresh fruits and vegetables, whole grains, and lean protein. If you have lost your appetite, eat small snacks rather than large meals. · Avoid drinking alcohol or using illegal drugs. Do not take medicines that have not been prescribed for you. They may interfere with medicines you may be taking for depression, or they may make your depression worse. · Take your medicines exactly as they are prescribed. You may start to feel better within 1 to 3 weeks of taking antidepressant medicine. But it can take as many as 6 to 8 weeks to see more improvement.  If you have questions or concerns about your medicines, or if you do not notice any improvement by 3 weeks, talk to your doctor. · If you have any side effects from your medicine, tell your doctor. Antidepressants can make you feel tired, dizzy, or nervous. Some people have dry mouth, constipation, headaches, sexual problems, or diarrhea. Many of these side effects are mild and will go away on their own after you have been taking the medicine for a few weeks. Some may last longer. Talk to your doctor if side effects are bothering you too much. You might be able to try a different medicine. · Get enough sleep. If you have problems sleeping: ¨ Go to bed at the same time every night, and get up at the same time every morning. ¨ Keep your bedroom dark and quiet. ¨ Do not exercise after 5:00 p.m. ¨ Avoid drinks with caffeine after 5:00 p.m. · Avoid sleeping pills unless they are prescribed by the doctor treating your depression. Sleeping pills may make you groggy during the day, and they may interact with other medicine you are taking. · If you have any other illnesses, such as diabetes, heart disease, or high blood pressure, make sure to continue with your treatment. Tell your doctor about all of the medicines you take, including those with or without a prescription. · Keep the numbers for these national suicide hotlines: 1-978-557-TALK (7-963.535.8287) and 4-507-INPWMZL (5-403.323.8692). If you or someone you know talks about suicide or feeling hopeless, get help right away. When should you call for help? Call 911 anytime you think you may need emergency care. For example, call if: 
? · You feel like hurting yourself or someone else. ? · Someone you know has depression and is about to attempt or is attempting suicide. ?Call your doctor now or seek immediate medical care if: 
? · You hear voices. ? · Someone you know has depression and: 
¨ Starts to give away his or her possessions. ¨ Uses illegal drugs or drinks alcohol heavily. ¨ Talks or writes about death, including writing suicide notes or talking about guns, knives, or pills. ¨ Starts to spend a lot of time alone. ¨ Acts very aggressively or suddenly appears calm. ? Watch closely for changes in your health, and be sure to contact your doctor if: 
? · You do not get better as expected. Where can you learn more? Go to http://nahid-maureen.info/. Enter B932 in the search box to learn more about \"Recovering From Depression: Care Instructions. \" Current as of: May 12, 2017 Content Version: 11.4 © 6550-2268 WineMeNow. Care instructions adapted under license by iversity (which disclaims liability or warranty for this information). If you have questions about a medical condition or this instruction, always ask your healthcare professional. [address] any warranty or liability for your use of this information. Introducing [facility]! Meng Ray introduces [employer] patient portal. Now you can access parts of your medical record, email your doctor's office, and request medication refills online. 1. In your internet browser, go to [email or web address] 2. Click on the First Time User? Click Here link in the Sign In box. You will see the New Member Sign Up page. 3. Enter your [employer] Access Code exactly as it appears below. You will not need to use this code after youve completed the sign-up process. If you do not sign up before the expiration date, you must request a new code. · [employer] Access Code: [email or web address] Expires: 1/29/2018 10:45 AM 
 
4. Enter the last four digits of your Social Security Number (xxxx) and Date of Birth (mm/dd/yyyy) as indicated and click Submit. You will be taken to the next sign-up page. 5. Create a [employer] ID. This will be your [employer] login ID and cannot be changed, so think of one that is secure and easy to remember. 6. Create a [employer] password. You can change your password at any time. 7. Enter your Password Reset Question and Answer. This can be used at a later time if you forget your password. 8. Enter your e-mail address. You will receive e-mail notification when new information is available in [address] 9. Click Sign Up. You can now view and download portions of your medical record. 10. Click the Download Summary menu link to download a portable copy of your medical information. If you have questions, please visit the Frequently Asked Questions section of the [employer] website. Remember, [employer] is NOT to be used for urgent needs. For medical emergencies, dial 911. Now available from your iPhone and Android! Please provide this summary of care documentation to your next provider. Your primary care clinician is listed as Landry Last. If you have any questions after today's visit, please call [phone].

## 2017-11-13 NOTE — PROGRESS NOTES
Chief Complaint   Patient presents with    Follow-up     3 month     Hypertension    Other     cholesterol, patient states that she has a lot going on at home and work. Patient states that she just want to stay in the bed. flu vaccine today. 1. Have you been to the ER, urgent care clinic since your last visit? Hospitalized since your last visit? No    2. Have you seen or consulted any other health care providers outside of the [address] [place] [address] since your last visit? Include any pap smears or colon screening.  No

## 2017-11-13 NOTE — PROGRESS NOTES
HISTORY OF PRESENT ILLNESS  Martell Lacey is a 65 y.o. female. HPI Comments: HTN: stable/improving. Denies any leg swelling or palpitations. Compliant with medication therapy at all times. BP is not measured at home. Depression: she is taking care of her mother who has dementia. She reports she has been feeling down and depressed in the past 2 weeks. She notes trouble falling asleep. Denies any SI and HI. PHQ: 14    Requesting medication refill. Follow-up   The history is provided by the patient. This is a new problem. Hypertension    The history is provided by the patient. This is a chronic problem. The current episode started more than 1 week ago. The problem has been gradually improving. Risk factors include diabetes mellitus, dyslipidemia and obesity. Other   The history is provided by the patient. This is a new problem. Review of Systems   Constitutional: Negative. HENT: Negative. Respiratory: Negative. Cardiovascular: Negative. Musculoskeletal: Positive for joint pain (right knee pain). Skin: Negative. Neurological: Negative. Psychiatric/Behavioral: Positive for depression. Past Medical History:   Diagnosis Date    Arthritis     Elevated cholesterol     Hypertension     Joint pain     knees     Past Surgical History:   Procedure Laterality Date    HX HYSTERECTOMY      HX ORTHOPAEDIC Right Feb, 2016    TKR    HX TONSILLECTOMY       Current Outpatient Prescriptions on File Prior to Visit   Medication Sig Dispense Refill    tiZANidine (ZANAFLEX) 4 mg tablet TAKE 1 TAB BY MOUTH THREE (3) TIMES DAILY AS NEEDED. 90 Tab 0    Comp. Stocking,Knee,Regular,Lrg misc Use daily and take off at night, 20mmHg 1 Packet 0    polyethylene glycol (MIRALAX) 17 gram/dose powder Take as directed by office (Patient taking differently: as needed. Take as directed by office) 255 g 0    MULTIVIT-MIN/FA/CALCIUM/VIT K1 (ONE-A-DAY WOMEN'S 50+ PO) Take  by mouth daily.      Ryanne Summers citrifolia fruit 250 mg cap Take 1 Cap by mouth daily.  Magnesium Oxide 500 mg cap Take  by mouth daily. No current facility-administered medications on file prior to visit. Allergies and Intolerances:   No Known Allergies    Family History:   Family History   Problem Relation Age of Onset    Diabetes Mother     Colon Polyps Mother     Dementia Mother     Heart Disease Father     Hypertension Brother        Social History:   She  reports that she has never smoked. She has never used smokeless tobacco. She  reports that she does not drink alcohol. Vitals:   Visit Vitals    /70 (BP 1 Location: Right arm, BP Patient Position: Sitting)  Comment: xl long cuff automated    Pulse 90    Temp 98 °F (36.7 °C) (Oral)    Resp 18    Ht 5' 9\" (1.753 m)    Wt 262 lb (118.8 kg)    SpO2 96%    BMI 38.69 kg/m2     Body surface area is 2.4 meters squared. Physical Exam   Constitutional: She is oriented to person, place, and time. She appears well-developed and well-nourished. HENT:   Head: Atraumatic. Cardiovascular: Normal rate. Pulmonary/Chest: Effort normal.   Neurological: She is alert and oriented to person, place, and time. Skin: Skin is warm. Psychiatric: She has a normal mood and affect. Her behavior is normal.   Nursing note and vitals reviewed. ASSESSMENT and PLAN    ICD-10-CM ICD-9-CM    1. Encounter for immunization Z23 V03.89 INFLUENZA VIRUS VAC QUAD,SPLIT,PRESV FREE SYRINGE IM   2. Impaired glucose tolerance R73.02 790.22 AMB POC HEMOGLOBIN A1C   3. HTN, goal below 130/80 I10 401.9    4. Essential hypertension I10 401.9 amLODIPine (NORVASC) 10 mg tablet      losartan-hydroCHLOROthiazide (HYZAAR) 100-25 mg per tablet   5. Hyperlipidemia, unspecified hyperlipidemia type E78.5 272.4 atorvastatin (LIPITOR) 20 mg tablet   6. Mild chronic anemia D64.9 285.9 ferrous sulfate 325 mg (65 mg iron) tablet   7.  Allergic rhinitis, unspecified chronicity, unspecified seasonality, unspecified trigger J30.9 477.9 fexofenadine (ALLEGRA) 180 mg tablet   8. Depression, unspecified depression type F32.9 311 citalopram (CELEXA) 20 mg tablet     Follow-up Disposition:  Return in about 6 weeks (around 12/25/2017), or if symptoms worsen or fail to improve, for depression. .  reviewed diet, exercise and weight control  cardiovascular risk and specific lipid/LDL goals reviewed  reviewed medications and side effects in detail    - Alarm signals discussed. ER precautions  - Plan of care reviewed with patient. Understanding verbalized and they are in agreement with plan of care.

## 2017-12-29 ENCOUNTER — OFFICE VISIT (OUTPATIENT)
Dept: FAMILY MEDICINE CLINIC | Facility: CLINIC | Age: 62
End: 2017-12-29

## 2017-12-29 VITALS
DIASTOLIC BLOOD PRESSURE: 78 MMHG | HEIGHT: 69 IN | SYSTOLIC BLOOD PRESSURE: 131 MMHG | BODY MASS INDEX: 38.51 KG/M2 | TEMPERATURE: 97 F | OXYGEN SATURATION: 99 % | WEIGHT: 260 LBS | HEART RATE: 84 BPM | RESPIRATION RATE: 16 BRPM

## 2017-12-29 DIAGNOSIS — F32.A DEPRESSION, UNSPECIFIED DEPRESSION TYPE: Primary | ICD-10-CM

## 2017-12-29 NOTE — MR AVS SNAPSHOT
Visit Information Date & Time Provider Department Dept. Phone Encounter #  
 12/29/2017 11:15 AM Natasha Montoya NP [employer] 96 [id number] Follow-up Instructions Return in about 3 months (around 3/29/2018), or if symptoms worsen or fail to improve, for HTN, IGT, depression. Your Appointments 12/29/2017 11:15 AM  
ROUTINE CARE with Natasha Montoya NP [facility] Main Office ([facility]) Appt Note: Follow up appt. [address] [facility] [address] [facility] [address]  
[phone]  
  
   
 [address] [facility] [address] [facility]  
  
    
 1/25/2018  1:30 PM  
New Patient with Jac Ayala MD  
[address] [facility], [address] and [facility] ([facility]) Appt Note: RIGHT KNEE PAIN/ REF BY DR MACHUCA/*ADVISED PT TO COME EARLY W. PHOTO ID & INS. CARD, CURRENT MEDICATION LIST & [facility] [address] [place] [address]  
[phone] [address] [place] [address], [facility] [address] Upcoming Health Maintenance Date Due FOBT Q 1 YEAR AGE 50-75 7/23/2006 ZOSTER VACCINE AGE 60> 5/23/2016 DTaP/Tdap/Td series (2 - Td) 10/31/2022 Allergies as of 12/29/2017  Review Complete On: 12/29/2017 By: Marcus Baxter No Known Allergies Current Immunizations  Reviewed on 10/12/2016 Name Date Influenza Vaccine (Quad) PF 11/13/2017, 10/12/2016 10:52 AM, 2/1/2016 10:37 AM  
 Influenza Vaccine PF 12/11/2013 TDAP Vaccine 10/31/2012 12:56 AM  
  
 Not reviewed this visit You Were Diagnosed With   
  
 Codes Comments Depression, unspecified depression type    -  Primary ICD-10-CM: F32.9 ICD-9-CM: 775 Vitals BP Pulse Temp Resp Height(growth percentile) Weight(growth percentile) 131/78 84 97 °F (36.1 °C) 16 5' 9\" (1.753 m) 260 lb (117.9 kg) SpO2 BMI OB Status Smoking Status 99% 38.4 kg/m2 Hysterectomy Never Smoker Vitals History BMI and BSA Data Body Mass Index Body Surface Area  
 38.4 kg/m 2 2.4 m 2 Preferred Pharmacy Pharmacy Name Phone [id number] Bateman [phone] Your Updated Medication List  
  
   
This list is accurate as of: 12/29/17 11:11 AM.  Always use your most recent med list. amLODIPine 10 mg tablet Commonly known as:  Ricky Mend TAKE 1 TABLET BY MOUTH DAILY  
  
 atorvastatin 20 mg tablet Commonly known as:  LIPITOR  
TAKE 1 TABLET BY MOUTH DAILY  
  
 citalopram 20 mg tablet Commonly known as:  Delona Nestle Take 1 Tab by mouth daily. Comp. [address] Use daily and take off at night, 20mmHg  
  
 ferrous sulfate 325 mg (65 mg iron) tablet TAKE 1 TABLET BY MOUTH TWICE A DAY WITH MEALS  
  
 fexofenadine 180 mg tablet Commonly known as:  Simeon Flax Take 1 Tab by mouth daily. losartan-hydroCHLOROthiazide 100-25 mg per tablet Commonly known as:  HYZAAR  
TAKE 1 TABLET BY MOUTH DAILY. Magnesium Oxide 500 mg Cap Take  by mouth daily. morinda citrifolia fruit 250 mg Cap Take 1 Cap by mouth daily. ONE-A-DAY WOMEN'S 50+ PO Take  by mouth daily. polyethylene glycol 17 gram/dose powder Commonly known as:  Whitley Clause Take as directed by office  
  
 tiZANidine 4 mg tablet Commonly known as:  Tonia Moree TAKE 1 TAB BY MOUTH THREE (3) TIMES DAILY AS NEEDED. Follow-up Instructions Return in about 3 months (around 3/29/2018), or if symptoms worsen or fail to improve, for HTN, IGT, depression. Patient Instructions Recovering From Depression: Care Instructions Your Care Instructions Taking good care of yourself is important as you recover from depression. In time, your symptoms will fade as your treatment takes hold. Do not give up. Instead, focus your energy on getting better. Your mood will improve. It just takes some time. Focus on things that can help you feel better, such as being with friends and family, eating well, and getting enough rest. But take things slowly. Do not do too much too soon. You will begin to feel better gradually. Follow-up care is a key part of your treatment and safety. Be sure to make and go to all appointments, and call your doctor if you are having problems. It's also a good idea to know your test results and keep a list of the medicines you take. How can you care for yourself at home? Be realistic · If you have a large task to do, break it up into smaller steps you can handle, and just do what you can. · You may want to put off important decisions until your depression has lifted. If you have plans that will have a major impact on your life, such as marriage, divorce, or a job change, try to wait a bit. Talk it over with friends and loved ones who can help you look at the overall picture first. 
· Reaching out to people for help is important. Do not isolate yourself. Let your family and friends help you. Find someone you can trust and confide in, and talk to that person. · Be patient, and be kind to yourself. Remember that depression is not your fault and is not something you can overcome with willpower alone. Treatment is necessary for depression, just like for any other illness. Feeling better takes time, and your mood will improve little by little. Stay active · Stay busy and get outside. Take a walk, or try some other light exercise. · Talk with your doctor about an exercise program. Exercise can help with mild depression. · Go to a movie or concert. Take part in a [employer] activity or other social gathering. Go to a ball game. · Ask a friend to have dinner with you. Take care of yourself · Eat a balanced diet with plenty of fresh fruits and vegetables, whole grains, and lean protein.  If you have lost your appetite, eat small snacks rather than large meals. · Avoid drinking alcohol or using illegal drugs. Do not take medicines that have not been prescribed for you. They may interfere with medicines you may be taking for depression, or they may make your depression worse. · Take your medicines exactly as they are prescribed. You may start to feel better within 1 to 3 weeks of taking antidepressant medicine. But it can take as many as 6 to 8 weeks to see more improvement. If you have questions or concerns about your medicines, or if you do not notice any improvement by 3 weeks, talk to your doctor. · If you have any side effects from your medicine, tell your doctor. Antidepressants can make you feel tired, dizzy, or nervous. Some people have dry mouth, constipation, headaches, sexual problems, or diarrhea. Many of these side effects are mild and will go away on their own after you have been taking the medicine for a few weeks. Some may last longer. Talk to your doctor if side effects are bothering you too much. You might be able to try a different medicine. · Get enough sleep. If you have problems sleeping: ¨ Go to bed at the same time every night, and get up at the same time every morning. ¨ Keep your bedroom dark and quiet. ¨ Do not exercise after 5:00 p.m. ¨ Avoid drinks with caffeine after 5:00 p.m. · Avoid sleeping pills unless they are prescribed by the doctor treating your depression. Sleeping pills may make you groggy during the day, and they may interact with other medicine you are taking. · If you have any other illnesses, such as diabetes, heart disease, or high blood pressure, make sure to continue with your treatment. Tell your doctor about all of the medicines you take, including those with or without a prescription. · Keep the numbers for these national suicide hotlines: 0-803-608-TALK (5-737.942.9637) and 4-991-RZSVTFI (6-767.859.7211).  If you or someone you know talks about suicide or feeling hopeless, get help right away. When should you call for help? Call 911 anytime you think you may need emergency care. For example, call if: 
? · You feel like hurting yourself or someone else. ? · Someone you know has depression and is about to attempt or is attempting suicide. ?Call your doctor now or seek immediate medical care if: 
? · You hear voices. ? · Someone you know has depression and: 
¨ Starts to give away his or her possessions. ¨ Uses illegal drugs or drinks alcohol heavily. ¨ Talks or writes about death, including writing suicide notes or talking about guns, knives, or pills. ¨ Starts to spend a lot of time alone. ¨ Acts very aggressively or suddenly appears calm. ? Watch closely for changes in your health, and be sure to contact your doctor if: 
? · You do not get better as expected. Where can you learn more? Go to http://nahid-maureen.info/. Enter A404 in the search box to learn more about \"Recovering From Depression: Care Instructions. \" Current as of: May 12, 2017 Content Version: 11.4 © 8794-5856 sli.do. Care instructions adapted under license by [employer] (which disclaims liability or warranty for this information). If you have questions about a medical condition or this instruction, always ask your healthcare professional. [address] any warranty or liability for your use of this information. Introducing [facility]! Armida Chung introduces Mplife.com patient portal. Now you can access parts of your medical record, email your doctor's office, and request medication refills online. 1. In your internet browser, go to [email or web address]. [employer]/Weesh 2. Click on the First Time User? Click Here link in the Sign In box. You will see the New Member Sign Up page. 3. Enter your Mplife.com Access Code exactly as it appears below.  You will not need to use this code after youve completed the sign-up process. If you do not sign up before the expiration date, you must request a new code. · Tumri Access Code: [email or web address] Expires: 1/29/2018 10:45 AM 
 
4. Enter the last four digits of your Social Security Number (xxxx) and Date of Birth (mm/dd/yyyy) as indicated and click Submit. You will be taken to the next sign-up page. 5. Create a Tumri ID. This will be your Tumri login ID and cannot be changed, so think of one that is secure and easy to remember. 6. Create a Tumri password. You can change your password at any time. 7. Enter your Password Reset Question and Answer. This can be used at a later time if you forget your password. 8. Enter your e-mail address. You will receive e-mail notification when new information is available in [address]. 9. Click Sign Up. You can now view and download portions of your medical record. 10. Click the Download Summary menu link to download a portable copy of your medical information. If you have questions, please visit the Frequently Asked Questions section of the Tumri website. Remember, Tumri is NOT to be used for urgent needs. For medical emergencies, dial 911. Now available from your iPhone and Android! Please provide this summary of care documentation to your next provider. Your primary care clinician is listed as Alice Olivares. If you have any questions after today's visit, please call [phone].

## 2017-12-29 NOTE — PATIENT INSTRUCTIONS
Recovering From Depression: Care Instructions  Your Care Instructions    Taking good care of yourself is important as you recover from depression. In time, your symptoms will fade as your treatment takes hold. Do not give up. Instead, focus your energy on getting better. Your mood will improve. It just takes some time. Focus on things that can help you feel better, such as being with friends and family, eating well, and getting enough rest. But take things slowly. Do not do too much too soon. You will begin to feel better gradually. Follow-up care is a key part of your treatment and safety. Be sure to make and go to all appointments, and call your doctor if you are having problems. It's also a good idea to know your test results and keep a list of the medicines you take. How can you care for yourself at home? Be realistic  · If you have a large task to do, break it up into smaller steps you can handle, and just do what you can. · You may want to put off important decisions until your depression has lifted. If you have plans that will have a major impact on your life, such as marriage, divorce, or a job change, try to wait a bit. Talk it over with friends and loved ones who can help you look at the overall picture first.  · Reaching out to people for help is important. Do not isolate yourself. Let your family and friends help you. Find someone you can trust and confide in, and talk to that person. · Be patient, and be kind to yourself. Remember that depression is not your fault and is not something you can overcome with willpower alone. Treatment is necessary for depression, just like for any other illness. Feeling better takes time, and your mood will improve little by little. Stay active  · Stay busy and get outside. Take a walk, or try some other light exercise. · Talk with your doctor about an exercise program. Exercise can help with mild depression. · Go to a movie or concert.  Take part in a [employer] activity or other social gathering. Go to a [employer] game. · Ask a friend to have dinner with you. Take care of yourself  · Eat a balanced diet with plenty of fresh fruits and vegetables, whole grains, and lean protein. If you have lost your appetite, eat small snacks rather than large meals. · Avoid drinking alcohol or using illegal drugs. Do not take medicines that have not been prescribed for you. They may interfere with medicines you may be taking for depression, or they may make your depression worse. · Take your medicines exactly as they are prescribed. You may start to feel better within 1 to 3 weeks of taking antidepressant medicine. But it can take as many as 6 to 8 weeks to see more improvement. If you have questions or concerns about your medicines, or if you do not notice any improvement by 3 weeks, talk to your doctor. · If you have any side effects from your medicine, tell your doctor. Antidepressants can make you feel tired, dizzy, or nervous. Some people have dry mouth, constipation, headaches, sexual problems, or diarrhea. Many of these side effects are mild and will go away on their own after you have been taking the medicine for a few weeks. Some may last longer. Talk to your doctor if side effects are bothering you too much. You might be able to try a different medicine. · Get enough sleep. If you have problems sleeping:  ¨ Go to bed at the same time every night, and get up at the same time every morning. ¨ Keep your bedroom dark and quiet. ¨ Do not exercise after 5:00 p.m. ¨ Avoid drinks with caffeine after 5:00 p.m. · Avoid sleeping pills unless they are prescribed by the doctor treating your depression. Sleeping pills may make you groggy during the day, and they may interact with other medicine you are taking. · If you have any other illnesses, such as diabetes, heart disease, or high blood pressure, make sure to continue with your treatment.  Tell your doctor about all of the medicines you take, including those with or without a prescription. · Keep the numbers for these national suicide hotlines: 7-099-518-TALK (4-116.481.5250) and 8-891-EWQGLVN (9-510.692.6322). If you or someone you know talks about suicide or feeling hopeless, get help right away. When should you call for help? Call 911 anytime you think you may need emergency care. For example, call if:  ? · You feel like hurting yourself or someone else. ? · Someone you know has depression and is about to attempt or is attempting suicide. ?Call your doctor now or seek immediate medical care if:  ? · You hear voices. ? · Someone you know has depression and:  ¨ Starts to give away his or her possessions. ¨ Uses illegal drugs or drinks alcohol heavily. ¨ Talks or writes about death, including writing suicide notes or talking about guns, knives, or pills. ¨ Starts to spend a lot of time alone. ¨ Acts very aggressively or suddenly appears calm. ? Watch closely for changes in your health, and be sure to contact your doctor if:  ? · You do not get better as expected. Where can you learn more? Go to http://nahid-maureen.info/. Enter B524 in the search box to learn more about \"Recovering From Depression: Care Instructions. \"  Current as of: May 12, 2017  Content Version: 11.4  © 8337-3547 [employer]. Care instructions adapted under license by [employer] (which disclaims liability or warranty for this information). If you have questions about a medical condition or this instruction, always ask your healthcare professional. [address] any warranty or liability for your use of this information.

## 2017-12-29 NOTE — PROGRESS NOTES
HISTORY OF PRESENT ILLNESS  Say Eden is a 65 y.o. female. HPI Comments: F/u on depression. PHQ today: 0. She reports she took celexa for 2 days and developed a headache therefore she stopped taking it and her headache resolved. She has not taken the medication since then. She reports she no longer feel overwhelmed. She reports she saw an old friend who she had not seen for 25 years over the holiday and they had a good time together talking and catching up. She appears to be in great spirits today. 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 She believes this and the fact that she had not taken a break from work recently made her depressed and overwhelmed. Depression   The history is provided by the patient. This is a new problem. The current episode started more than 1 week ago. The problem occurs rarely. The problem has been gradually improving. Treatments tried: celexa. Review of Systems   Constitutional: Negative. HENT: Negative. Respiratory: Negative. Cardiovascular: Negative. Genitourinary: Negative. Musculoskeletal: Negative. Psychiatric/Behavioral: Positive for depression. Past Medical History:   Diagnosis Date    Arthritis     Elevated cholesterol     Hypertension     Joint pain     knees     Past Surgical History:   Procedure Laterality Date    HX HYSTERECTOMY      HX ORTHOPAEDIC Right Feb, 2016    TKR    HX TONSILLECTOMY       Current Outpatient Prescriptions on File Prior to Visit   Medication Sig Dispense Refill    amLODIPine (NORVASC) 10 mg tablet TAKE 1 TABLET BY MOUTH DAILY 30 Tab 3    atorvastatin (LIPITOR) 20 mg tablet TAKE 1 TABLET BY MOUTH DAILY 30 Tab 3    ferrous sulfate 325 mg (65 mg iron) tablet TAKE 1 TABLET BY MOUTH TWICE A DAY WITH MEALS 60 Tab 3    fexofenadine (ALLEGRA) 180 mg tablet Take 1 Tab by mouth daily. 30 Tab 3    losartan-hydroCHLOROthiazide (HYZAAR) 100-25 mg per tablet TAKE 1 TABLET BY MOUTH DAILY.  30 Tab 3    tiZANidine (ZANAFLEX) 4 mg tablet TAKE 1 TAB BY MOUTH THREE (3) TIMES DAILY AS NEEDED. 90 Tab 0    Comp. Stocking,Knee,Regular,Lrg misc Use daily and take off at night, 20mmHg 1 Packet 0    polyethylene glycol (MIRALAX) 17 gram/dose powder Take as directed by office (Patient taking differently: as needed. Take as directed by office) 255 g 0    MULTIVIT-MIN/FA/CALCIUM/VIT K1 (ONE-A-DAY WOMEN'S 50+ PO) Take  by mouth daily.  morinda citrifolia fruit 250 mg cap Take 1 Cap by mouth daily.  Magnesium Oxide 500 mg cap Take  by mouth daily.  citalopram (CELEXA) 20 mg tablet Take 1 Tab by mouth daily. 30 Tab 3     No current facility-administered medications on file prior to visit. Allergies and Intolerances:   No Known Allergies    Family History:   Family History   Problem Relation Age of Onset    Diabetes Mother     Colon Polyps Mother     Dementia Mother     Heart Disease Father     Hypertension Brother        Social History:   She  reports that she has never smoked. She has never used smokeless tobacco. She  reports that she does not drink alcohol. Vitals:   Visit Vitals    /78    Pulse 84    Temp 97 °F (36.1 °C)    Resp 16    Ht 5' 9\" (1.753 m)    Wt 260 lb (117.9 kg)    SpO2 99%    BMI 38.4 kg/m2     Body surface area is 2.4 meters squared.   PHQ over the last two weeks 12/29/2017   Little interest or pleasure in doing things Not at all   Feeling down, depressed or hopeless Not at all   Total Score PHQ 2 0   Trouble falling or staying asleep, or sleeping too much -   Feeling tired or having little energy -   Poor appetite or overeating -   Feeling bad about yourself - or that you are a failure or have let yourself or your family down -   Trouble concentrating on things such as school, work, reading or watching TV -   Moving or speaking so slowly that other people could have noticed; or the opposite being so fidgety that others notice -   Thoughts of being better off dead, or hurting yourself in some way -   PHQ 9 Score -   How difficult have these problems made it for you to do your work, take care of your home and get along with others -       Physical Exam   Constitutional: She is oriented to person, place, and time. She appears well-developed and well-nourished. Cardiovascular: Normal rate. Neurological: She is alert and oriented to person, place, and time. Psychiatric: She has a normal mood and affect. Her behavior is normal.   Nursing note and vitals reviewed. ASSESSMENT and PLAN    ICD-10-CM ICD-9-CM    1. Depression, unspecified depression type F32.9 311      Follow-up Disposition:  Return in about 3 months (around 3/29/2018), or if symptoms worsen or fail to improve, for HTN, IGT, depression. reviewed medications and side effects in detail  Hold celexa for now. - Alarm signals discussed. ER precautions  - Plan of care reviewed with patient. Understanding verbalized and they are in agreement with plan of care.

## 2018-02-02 ENCOUNTER — OFFICE VISIT (OUTPATIENT)
Dept: ORTHOPEDIC SURGERY | Age: 63
End: 2018-02-02

## 2018-02-02 VITALS
HEIGHT: 69 IN | WEIGHT: 268 LBS | TEMPERATURE: 96.6 F | SYSTOLIC BLOOD PRESSURE: 126 MMHG | DIASTOLIC BLOOD PRESSURE: 72 MMHG | BODY MASS INDEX: 39.69 KG/M2 | HEART RATE: 92 BPM | RESPIRATION RATE: 16 BRPM | OXYGEN SATURATION: 100 %

## 2018-02-02 DIAGNOSIS — I73.9 PERIPHERAL VASCULAR DISEASE (HCC): ICD-10-CM

## 2018-02-02 DIAGNOSIS — G89.29 CHRONIC PAIN OF RIGHT KNEE: Primary | ICD-10-CM

## 2018-02-02 DIAGNOSIS — M25.561 CHRONIC PAIN OF RIGHT KNEE: Primary | ICD-10-CM

## 2018-02-02 DIAGNOSIS — T84.012A FAILED TOTAL RIGHT KNEE REPLACEMENT, INITIAL ENCOUNTER (HCC): ICD-10-CM

## 2018-02-02 NOTE — PROGRESS NOTES
Patient: Abdullahi Jon                MRN: [id number]       SSN: [id number]  YOB: 1955        AGE: 65 y.o. SEX: female  Body mass index is 39.58 kg/(m^2). PCP: Pedro Pablo Ma MD  02/02/18    HISTORY: I had the pleasure of seeing [facility] for opinion and advice with regards to her right knee. She is a very nice lady and had some problems with her right knee. Apparently, there were some earlier falls. She is happy with her left knee replacement. She has gone on to have some loosening involving the right femoral component. She does describe start-up pain. It is moderate and aching every time she stands up or for prolonged walking, it hurts her in the thigh. I appreciate the CT scan that was included, which demonstrates her patellar ligament is intact, although it does look a little calcified on x-ray. Having said this, she can straight leg raise without any lag whatsoever. She has known peripheral vascular disease and peripheral edema. Her mother had similar looking legs. She denies infection or any other type of complication. PHYSICAL EXAMINATION:  On examination today, she is a very nice lady. She has a BMI of 39. She bends to about 90-95° and has almost full extension. There is some crepitus with terminal extension. She has a positive start-up sign when she first ambulates. She is alert and oriented. Affect is normal.  She is a very pleasant lady. She has about 1.5+ pitting edema distally. RADIOGRAPHS:  Review of her x-rays demonstrates the femoral component and stem have remodeled into varus. The tibia looks good. She has calcifications involving the patellar ligament and some tilt on the patella with the skyline view as well. PLAN:  This lady requires a revision. I would like her peripheral edema to be a little bit better. I would ask her family doctor to initiate more of a diuretic pill and send her to Vascular.   We will confirm the absence of infection with some blood tests, and I would be very happy to revise the knee for her. We did discuss the risks and benefits including but not limited to infection, DVT, pulmonary embolism, anesthetic complications, blood loss requiring transfusion, as well as we may have to do a special procedure with the extensor mechanism, and she may need to wear a brace for up to three months or so afterwards. All risks and benefits were described including chronic pain, re-revision, stiffness, scar tissue, and implant longevity. She elects to proceed. REVIEW OF SYSTEMS:      CON: negative for weight loss, fever  EYE: negative for double vision  ENT: negative for hoarseness  RS:   negative for Tb  GI:    negative for blood in stool  :  negative for blood in urine  Other systems reviewed and noted below. Past Medical History:   Diagnosis Date    Arthritis     Elevated cholesterol     Hypertension     Joint pain     knees       Family History   Problem Relation Age of Onset    Diabetes Mother     Colon Polyps Mother     Dementia Mother     Heart Disease Father     Hypertension Brother        Current Outpatient Prescriptions   Medication Sig Dispense Refill    amLODIPine (NORVASC) 10 mg tablet TAKE 1 TABLET BY MOUTH DAILY 30 Tab 3    atorvastatin (LIPITOR) 20 mg tablet TAKE 1 TABLET BY MOUTH DAILY 30 Tab 3    ferrous sulfate 325 mg (65 mg iron) tablet TAKE 1 TABLET BY MOUTH TWICE A DAY WITH MEALS 60 Tab 3    fexofenadine (ALLEGRA) 180 mg tablet Take 1 Tab by mouth daily. 30 Tab 3    losartan-hydroCHLOROthiazide (HYZAAR) 100-25 mg per tablet TAKE 1 TABLET BY MOUTH DAILY. 30 Tab 3    tiZANidine (ZANAFLEX) 4 mg tablet TAKE 1 TAB BY MOUTH THREE (3) TIMES DAILY AS NEEDED. 90 Tab 0    Comp. Stocking,Knee,Regular,Lrg misc Use daily and take off at night, 20mmHg 1 Packet 0    polyethylene glycol (MIRALAX) 17 gram/dose powder Take as directed by office (Patient taking differently: as needed.  Take as directed by office) 255 g 0    MULTIVIT-MIN/FA/CALCIUM/VIT K1 (ONE-A-DAY WOMEN'S 50+ PO) Take  by mouth daily.  morinda citrifolia fruit 250 mg cap Take 1 Cap by mouth daily.  Magnesium Oxide 500 mg cap Take  by mouth daily. No Known Allergies    Past Surgical History:   Procedure Laterality Date    HX HYSTERECTOMY      HX KNEE REPLACEMENT      HX ORTHOPAEDIC Right Feb, 2016    TKR    HX TONSILLECTOMY         Social History     Social History    Marital status: SINGLE     Spouse name: N/A    Number of children: N/A    Years of education: N/A     Occupational History    Not on file. Social History Main Topics    Smoking status: Never Smoker    Smokeless tobacco: Never Used    Alcohol use No    Drug use: No    Sexual activity: Yes     Partners: Male     Birth control/ protection: None     Other Topics Concern     Service No    Blood Transfusions No    Caffeine Concern Yes    Occupational Exposure No    Hobby Hazards No    Sleep Concern No    Stress Concern No    Weight Concern No    Special Diet No    Back Care No    Exercise No    Bike Helmet No    Seat Belt Yes    Self-Exams Yes     Social History Narrative       Visit Vitals    /72    Pulse 92    Temp 96.6 °F (35.9 °C) (Oral)    Resp 16    Ht 5' 9\" (1.753 m)    Wt 268 lb (121.6 kg)    SpO2 100%    BMI 39.58 kg/m2         PHYSICAL EXAMINATION:  GENERAL: Alert and oriented x3, in no acute distress, well-developed, well-nourished, afebrile. HEART: No JVD. EYES: No scleral icterus   NECK: No significant lymphadenopathy   LUNGS: No respiratory compromise or indrawing  ABDOMEN: Soft, non-tender, non-distended. Electronically signed by:  Chaim Cisneros MD

## 2018-03-01 ENCOUNTER — HOSPITAL ENCOUNTER (OUTPATIENT)
Dept: LAB | Age: 63
Discharge: HOME | End: 2018-03-01
Payer: COMMERCIAL

## 2018-03-01 LAB
BASOPHILS # BLD: 0 K/UL (ref 0–0.1)
BASOPHILS NFR BLD: 0 % (ref 0–2)
CRP SERPL-MCNC: 1 MG/DL (ref 0–0.3)
DIFFERENTIAL METHOD BLD: NORMAL
EOSINOPHIL # BLD: 0.2 K/UL (ref 0–0.4)
EOSINOPHIL NFR BLD: 2 % (ref 0–5)
ERYTHROCYTE [DISTWIDTH] IN BLOOD BY AUTOMATED COUNT: 13.3 % (ref 11.6–14.5)
ERYTHROCYTE [SEDIMENTATION RATE] IN BLOOD: 48 MM/HR (ref 0–30)
HCT VFR BLD AUTO: 36.5 % (ref 35–45)
HGB BLD-MCNC: 12.4 G/DL (ref 12–16)
LYMPHOCYTES # BLD: 1.8 K/UL (ref 0.9–3.6)
LYMPHOCYTES NFR BLD: 21 % (ref 21–52)
MCH RBC QN AUTO: 27.4 PG (ref 24–34)
MCHC RBC AUTO-ENTMCNC: 34 G/DL (ref 31–37)
MCV RBC AUTO: 80.8 FL (ref 74–97)
MONOCYTES # BLD: 0.6 K/UL (ref 0.05–1.2)
MONOCYTES NFR BLD: 7 % (ref 3–10)
NEUTS SEG # BLD: 5.7 K/UL (ref 1.8–8)
NEUTS SEG NFR BLD: 70 % (ref 40–73)
PLATELET # BLD AUTO: 340 K/UL (ref 135–420)
PMV BLD AUTO: 9.6 FL (ref 9.2–11.8)
RBC # BLD AUTO: 4.52 M/UL (ref 4.2–5.3)
URATE SERPL-MCNC: 7.6 MG/DL (ref 2.6–7.2)
WBC # BLD AUTO: 8.3 K/UL (ref 4.6–13.2)

## 2018-03-01 PROCEDURE — 83520 IMMUNOASSAY QUANT NOS NONAB: CPT | Performed by: ORTHOPAEDIC SURGERY

## 2018-03-01 PROCEDURE — 85025 COMPLETE CBC W/AUTO DIFF WBC: CPT | Performed by: ORTHOPAEDIC SURGERY

## 2018-03-01 PROCEDURE — 36415 COLL VENOUS BLD VENIPUNCTURE: CPT | Performed by: ORTHOPAEDIC SURGERY

## 2018-03-01 PROCEDURE — 86140 C-REACTIVE PROTEIN: CPT | Performed by: ORTHOPAEDIC SURGERY

## 2018-03-01 PROCEDURE — 84550 ASSAY OF BLOOD/URIC ACID: CPT | Performed by: ORTHOPAEDIC SURGERY

## 2018-03-01 PROCEDURE — 85652 RBC SED RATE AUTOMATED: CPT | Performed by: ORTHOPAEDIC SURGERY

## 2018-03-02 ENCOUNTER — OFFICE VISIT (OUTPATIENT)
Dept: ORTHOPEDIC SURGERY | Age: 63
End: 2018-03-02

## 2018-03-02 VITALS
BODY MASS INDEX: 39.37 KG/M2 | SYSTOLIC BLOOD PRESSURE: 156 MMHG | OXYGEN SATURATION: 93 % | TEMPERATURE: 97 F | HEIGHT: 69 IN | HEART RATE: 97 BPM | DIASTOLIC BLOOD PRESSURE: 82 MMHG | WEIGHT: 265.8 LBS

## 2018-03-02 DIAGNOSIS — G89.29 CHRONIC PAIN OF RIGHT KNEE: Primary | ICD-10-CM

## 2018-03-02 DIAGNOSIS — M25.561 CHRONIC PAIN OF RIGHT KNEE: Primary | ICD-10-CM

## 2018-03-02 DIAGNOSIS — M1A.09X0 CHRONIC GOUT OF MULTIPLE SITES, UNSPECIFIED CAUSE: ICD-10-CM

## 2018-03-02 DIAGNOSIS — M65.9 SYNOVITIS: ICD-10-CM

## 2018-03-02 RX ORDER — ALLOPURINOL 100 MG/1
100 TABLET ORAL 2 TIMES DAILY
Qty: 60 TAB | Refills: 0 | Status: SHIPPED | OUTPATIENT
Start: 2018-03-02 | End: 2018-05-10 | Stop reason: SDUPTHER

## 2018-03-02 NOTE — PROGRESS NOTES
Patient: Hailee Sharp                MRN: [id number]       SSN: [id number]  YOB: 1955        AGE: 65 y.o. SEX: female  Body mass index is 39.25 kg/(m^2). PCP: Marlene Mirza MD  03/02/18    HISTORY: Ms. Camila Marinelli is a delightful lady who has had a knee replacement by one of my colleagues and has had failure on the femoral side. Her C-reactive protein is actually at the upper limit of normal at 1.0. The sed rate is at 48. Her uric acid level is 7.6. We are going to start her with some Allopurinol, and the IL-6 is not returned yet. I am also going to order some rheumatological labs, in terms of ISMA and rheumatoid factor. If her IL-6 is up as well, we will need to aspirate the knee. She did have an aspiration by Dr. Armando Pineda at one point, and I do not see the results of it from the system. It may have been done at another facility. Having said this, at the next visit, we will be having a look at her IL-6 and aspirate possibly from [facility]. PHYSICAL EXAMINATION:  On examination today, she is wearing her stockings. She has about 1+ pitting edema, and I do recommend a diuretic. I have asked her to check with her family doctor to get started on one. She is also going to be seeing vascular as well. She does have some start-up pain, and it does hurt her in the thigh. PLAN:  I look forward to revising the knee for her, and she is excited about it as well. We will just have to rule out an infection. Therefore, at the next visit, we will be reviewing her IL-6 and reviewing her rheumatological labs and possible aspiration results from a [facility] facility if available. REVIEW OF SYSTEMS:      CON: negative for weight loss, fever  EYE: negative for double vision  ENT: negative for hoarseness  RS:   negative for Tb  GI:    negative for blood in stool  :  negative for blood in urine  Other systems reviewed and noted below.           Past Medical History: Diagnosis Date    Arthritis     Elevated cholesterol     Hypertension     Joint pain     knees       Family History   Problem Relation Age of Onset    Diabetes Mother     Colon Polyps Mother     Dementia Mother     Heart Disease Father     Hypertension Brother        Current Outpatient Prescriptions   Medication Sig Dispense Refill    allopurinol (ZYLOPRIM) 100 mg tablet Take 1 Tab by mouth two (2) times a day. 60 Tab 0    amLODIPine (NORVASC) 10 mg tablet TAKE 1 TABLET BY MOUTH DAILY 30 Tab 3    atorvastatin (LIPITOR) 20 mg tablet TAKE 1 TABLET BY MOUTH DAILY 30 Tab 3    ferrous sulfate 325 mg (65 mg iron) tablet TAKE 1 TABLET BY MOUTH TWICE A DAY WITH MEALS 60 Tab 3    fexofenadine (ALLEGRA) 180 mg tablet Take 1 Tab by mouth daily. 30 Tab 3    losartan-hydroCHLOROthiazide (HYZAAR) 100-25 mg per tablet TAKE 1 TABLET BY MOUTH DAILY. 30 Tab 3    tiZANidine (ZANAFLEX) 4 mg tablet TAKE 1 TAB BY MOUTH THREE (3) TIMES DAILY AS NEEDED. 90 Tab 0    Comp. Stocking,Knee,Regular,Lrg misc Use daily and take off at night, 20mmHg 1 Packet 0    polyethylene glycol (MIRALAX) 17 gram/dose powder Take as directed by office (Patient taking differently: as needed. Take as directed by office) 255 g 0    MULTIVIT-MIN/FA/CALCIUM/VIT K1 (ONE-A-DAY WOMEN'S 50+ PO) Take  by mouth daily.  morinda citrifolia fruit 250 mg cap Take 1 Cap by mouth daily.  Magnesium Oxide 500 mg cap Take  by mouth daily. No Known Allergies    Past Surgical History:   Procedure Laterality Date    HX HYSTERECTOMY      HX KNEE REPLACEMENT      HX ORTHOPAEDIC Right Feb, 2016    TKR    HX TONSILLECTOMY         Social History     Social History    Marital status: SINGLE     Spouse name: N/A    Number of children: N/A    Years of education: N/A     Occupational History    Not on file.      Social History Main Topics    Smoking status: Never Smoker    Smokeless tobacco: Never Used    Alcohol use No    Drug use: No    Sexual activity: Yes     Partners: Male     Birth control/ protection: None     Other Topics Concern     Service No    Blood Transfusions No    Caffeine Concern Yes    Occupational Exposure No    Hobby Hazards No    Sleep Concern No    Stress Concern No    Weight Concern No    Special Diet No    Back Care No    Exercise No    Bike Helmet No    Seat Belt Yes    Self-Exams Yes     Social History Narrative       Visit Vitals    /82    Pulse 97    Temp 97 °F (36.1 °C)    Ht 5' 9\" (1.753 m)    Wt 265 lb 12.8 oz (120.6 kg)    SpO2 93%    BMI 39.25 kg/m2         PHYSICAL EXAMINATION:  GENERAL: Alert and oriented x3, in no acute distress, well-developed, well-nourished, afebrile. HEART: No JVD. EYES: No scleral icterus   NECK: No significant lymphadenopathy   LUNGS: No respiratory compromise or indrawing  ABDOMEN: Soft, non-tender, non-distended. Electronically signed by:  Garrett Hackett MD

## 2018-03-05 ENCOUNTER — OFFICE VISIT (OUTPATIENT)
Dept: VASCULAR SURGERY | Age: 63
End: 2018-03-05

## 2018-03-05 ENCOUNTER — HOSPITAL ENCOUNTER (OUTPATIENT)
Dept: LAB | Age: 63
Discharge: HOME | End: 2018-03-05

## 2018-03-05 VITALS
RESPIRATION RATE: 19 BRPM | SYSTOLIC BLOOD PRESSURE: 130 MMHG | DIASTOLIC BLOOD PRESSURE: 86 MMHG | WEIGHT: 265 LBS | HEART RATE: 90 BPM | BODY MASS INDEX: 39.25 KG/M2 | HEIGHT: 69 IN

## 2018-03-05 DIAGNOSIS — M25.561 CHRONIC PAIN OF RIGHT KNEE: ICD-10-CM

## 2018-03-05 DIAGNOSIS — M17.9 OSTEOARTHRITIS OF KNEE, UNSPECIFIED LATERALITY, UNSPECIFIED OSTEOARTHRITIS TYPE: ICD-10-CM

## 2018-03-05 DIAGNOSIS — M79.89 LEG SWELLING: Primary | ICD-10-CM

## 2018-03-05 DIAGNOSIS — G89.29 CHRONIC PAIN OF RIGHT KNEE: ICD-10-CM

## 2018-03-05 DIAGNOSIS — E66.9 OBESITY (BMI 30-39.9): ICD-10-CM

## 2018-03-05 LAB — SENTARA SPECIMEN COL,SENBCF: NORMAL

## 2018-03-05 PROCEDURE — 99001 SPECIMEN HANDLING PT-LAB: CPT | Performed by: ORTHOPAEDIC SURGERY

## 2018-03-05 NOTE — MR AVS SNAPSHOT
[address] [facility] 
 
 
 [address] [place] [address] [facility] [address] 
[phone] Patient: Curt Kumar MRN: [id number] KZF:6/21/[birth year]10 Visit Information Date & Time Provider Department Dept. Phone Encounter #  
 3/5/2018 10:30 AM [id number] [facility]  Your Appointments 3/9/2018 12:45 PM  
PROCEDURE with [facility] 1 [facility] ([address] [place] [address]) Appt Note: linden reflux wild next available 2300 [facility] [address] [facility] [address]  
[phone] 2630 [facility] 1M07  
  
    
 3/22/2018 10:45 AM  
Follow Up with [address] [place], [place] [facility] ([address] [place] [address]) Appt Note: follow up after study 2300 [facility] [address] [facility] [address]  
[phone] 2300 [facility] 47 [facility]  
  
    
 3/23/2018 10:15 AM  
Follow Up with Yves Walker MD  
914 [facility] and [facility] ([facility]) Appt Note: lab work/3wk fu  
 27 [facility] [place], [facility] [address] [facility] [address]  
[phone] 2300 [facility]  
  
    
 3/29/2018 11:00 AM  
ROUTINE CARE with Kylie Robledo NP [facility] Main Office ([facility]) Appt Note: 3 month follow up appt. for HTN, IGT, depression 14 [facility] [address] [facility] [address]  
[phone]  
  
   
 14 [facility] 1790 [facility]  
  
    
 4/23/2018  9:30 AM  
ROUTINE CARE with Kylie Robledo NP [facility] Main Office ([facility]) Appt Note: pre op clearance, total right knee revision on 5/2, Dr. Kamryn Franklin, [address] [place] [address]  
[phone]  
  
    
 4/24/2018  9:30 AM  
PROCEDURE with Cresencio Astudillo PA-C  
 914 [facility], [address] and [facility] - [place] [address] ([address]) Appt Note: RIGHT TOTAL KNEE REV  
 [address], [address] [address] [facility]  
[phone]  
  
   
 [address], [address] [address]  
  
    
 4/25/2018  9:00 AM  
HISTORY AND PHYSICAL with Boo Redd PA-C  
[facility] - [address]) Appt Note: RIGHT TOTAL KNEE REV  
 [address], [address] [facility] [id number] [phone]  
  
   
 [address], [address] [address] Upcoming Health Maintenance Date Due FOBT Q 1 YEAR AGE 50-75 7/23/2006 ZOSTER VACCINE AGE 60> 5/23/2016 BREAST CANCER SCRN MAMMOGRAM 7/26/2018 DTaP/Tdap/Td series (2 - Td) 10/31/2022 Allergies as of 3/5/2018  Review Complete On: 3/5/2018 By: Alfonso May No Known Allergies Current Immunizations  Reviewed on 10/12/2016 Name Date Influenza Vaccine (Quad) PF 11/13/2017, 10/12/2016 10:52 AM, 2/1/2016 10:37 AM  
 Influenza Vaccine PF 12/11/2013 TDAP Vaccine 10/31/2012 12:56 AM  
  
 Not reviewed this visit You Were Diagnosed With   
  
 Codes Comments Leg swelling    -  Primary ICD-10-CM: M79.89 ICD-9-CM: 729.81 Vitals BP Pulse Resp Height(growth percentile) Weight(growth percentile) BMI  
 130/86 (BP 1 Location: Left arm, BP Patient Position: Sitting) 90 19 5' 9\" (1.753 m) 265 lb (120.2 kg) 39.13 kg/m2 OB Status Smoking Status Hysterectomy Never Smoker BMI and BSA Data Body Mass Index Body Surface Area  
 39.13 kg/m 2 2.42 m 2 Preferred Pharmacy Pharmacy Name Phone [id number] [address] [phone] Your Updated Medication List  
  
   
This list is accurate as of 3/5/18 10:59 AM.  Always use your most recent med list.  
  
  
  
  
 allopurinol 100 mg tablet Commonly known as:  Ok Card Take 1 Tab by mouth two (2) times a day. amLODIPine 10 mg tablet Commonly known as:  Mayte Estrada TAKE 1 TABLET BY MOUTH DAILY  
  
 atorvastatin 20 mg tablet Commonly known as:  LIPITOR  
TAKE 1 TABLET BY MOUTH DAILY Comp. [address] [address] Use daily and take off at night, 20mmHg  
  
 ferrous sulfate 325 mg (65 mg iron) tablet TAKE 1 TABLET BY MOUTH TWICE A DAY WITH MEALS  
  
 fexofenadine 180 mg tablet Commonly known as:  Jolyne Gall Take 1 Tab by mouth daily. losartan-hydroCHLOROthiazide 100-25 mg per tablet Commonly known as:  HYZAAR  
TAKE 1 TABLET BY MOUTH DAILY. Magnesium Oxide 500 mg Cap Take  by mouth daily. morinda citrifolia fruit 250 mg Cap Take 1 Cap by mouth daily. ONE-A-DAY WOMEN'S 50+ PO Take  by mouth daily. polyethylene glycol 17 gram/dose powder Commonly known as:  Manuelita Bush Take as directed by office  
  
 tiZANidine 4 mg tablet Commonly known as:  Lisbeth Poet TAKE 1 TAB BY MOUTH THREE (3) TIMES DAILY AS NEEDED. To-Do List   
 03/30/2018 Imaging:  DUPLEX LOWER EXT VENOUS BILAT AMB Introducing [facility]! [facility] introduces Arim[employer] patient portal. Now you can access parts of your medical record, email your doctor's office, and request medication refills online. 1. In your internet browser, go to [email or web address] 2. Click on the First Time User? Click Here link in the Sign In box. You will see the New Member Sign Up page. 3. Enter your Arimaz Access Code exactly as it appears below. You will not need to use this code after youve completed the sign-up process. If you do not sign up before the expiration date, you must request a new code. · Arim[employer] Access Code: [email or web address] Expires: 5/30/2018 11:02 AM 
 
4. Enter the last four digits of your Social Security Number (xxxx) and Date of Birth (mm/dd/yyyy) as indicated and click Submit. You will be taken to the next sign-up page. 5. Create a BaseTrace ID. This will be your BaseTrace login ID and cannot be changed, so think of one that is secure and easy to remember. 6. Create a BaseTrace password. You can change your password at any time. 7. Enter your Password Reset Question and Answer. This can be used at a later time if you forget your password. 8. Enter your e-mail address. You will receive e-mail notification when new information is available in [address] 9. Click Sign Up. You can now view and download portions of your medical record. 10. Click the Download Summary menu link to download a portable copy of your medical information. If you have questions, please visit the Frequently Asked Questions section of the BaseTrace website. Remember, BaseTrace is NOT to be used for urgent needs. For medical emergencies, dial 911. Now available from your iPhone and Android! Please provide this summary of care documentation to your next provider. Your primary care clinician is listed as Janna Claude. If you have any questions after today's visit, please call [phone].

## 2018-03-06 LAB — IL6 SERPL-MCNC: 5.1 PG/ML (ref 0–15.5)

## 2018-03-06 NOTE — PROGRESS NOTES
[place]    Chief Complaint   Patient presents with   Alonso Rodriguez New Patient    Knee Pain     Dr Karla Sharma, Surgery       HPI    [place] is a 65 y.o. female who presents to the office today at the request of Dr Duke Britton for bilateral lower extremity edema. She has history of bilateral knee replacements and is scheduled for revision of her right knee in April secondary to trauma caused from a fall onto her knee. She states she takes care of her mother who is sick with dementia and fell while helping her mother transfer. She states she has had swelling in her legs for at least the last year. She does not describe any claudication symptoms. No rest pain. No history of DVT. No history of heart failure or CKD. She states that she is not currently taking any diuretics but has taken them in the past. She does have an appointment to see her PCP this week as well to discuss possibly restarting the diuretics. She states that she stands her feet for long hours at work. 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 She does wear compression stockings which do seem to help. She also elevates her legs whenever able and states this is very helpful. She states that in the morning her leg swelling is almost completely resolved but as the day goes on the swelling worsens. She denies any fever/chills. No history of ulcers. Past Medical History:   Diagnosis Date    Arthritis     Elevated cholesterol     Hypertension     Joint pain     knees     Patient Active Problem List   Diagnosis Code    Arthritis M19.90    Knee pain M25.569    Mild chronic anemia D64.9    Impaired glucose tolerance R73.02    Essential hypertension I10    Obesity (BMI 30-39. 9) E66.9    Hyperlipidemia E78.5    Osteoarthritis, knee M17.10    Knee osteoarthritis M17.10     Past Surgical History:   Procedure Laterality Date    HX HYSTERECTOMY      HX KNEE REPLACEMENT      HX ORTHOPAEDIC Right Feb, 2016    TKR    HX TONSILLECTOMY Current Outpatient Prescriptions   Medication Sig Dispense Refill    amLODIPine (NORVASC) 10 mg tablet TAKE 1 TABLET BY MOUTH DAILY 30 Tab 3    atorvastatin (LIPITOR) 20 mg tablet TAKE 1 TABLET BY MOUTH DAILY 30 Tab 3    ferrous sulfate 325 mg (65 mg iron) tablet TAKE 1 TABLET BY MOUTH TWICE A DAY WITH MEALS 60 Tab 3    fexofenadine (ALLEGRA) 180 mg tablet Take 1 Tab by mouth daily. 30 Tab 3    losartan-hydroCHLOROthiazide (HYZAAR) 100-25 mg per tablet TAKE 1 TABLET BY MOUTH DAILY. 30 Tab 3    tiZANidine (ZANAFLEX) 4 mg tablet TAKE 1 TAB BY MOUTH THREE (3) TIMES DAILY AS NEEDED. 90 Tab 0    Comp. Stocking,Knee,Regular,Lrg misc Use daily and take off at night, 20mmHg 1 Packet 0    polyethylene glycol (MIRALAX) 17 gram/dose powder Take as directed by office (Patient taking differently: as needed. Take as directed by office) 255 g 0    MULTIVIT-MIN/FA/CALCIUM/VIT K1 (ONE-A-DAY WOMEN'S 50+ PO) Take  by mouth daily.  morinda citrifolia fruit 250 mg cap Take 1 Cap by mouth daily.  Magnesium Oxide 500 mg cap Take  by mouth daily.  allopurinol (ZYLOPRIM) 100 mg tablet Take 1 Tab by mouth two (2) times a day. 61 Tab 0     No Known Allergies  Social History     Social History    Marital status: SINGLE     Spouse name: N/A    Number of children: N/A    Years of education: N/A     Occupational History    Not on file.      Social History Main Topics    Smoking status: Never Smoker    Smokeless tobacco: Never Used    Alcohol use No    Drug use: No    Sexual activity: Yes     Partners: Male     Birth control/ protection: None     Other Topics Concern     Service No    Blood Transfusions No    Caffeine Concern Yes    Occupational Exposure No    Hobby Hazards No    Sleep Concern No    Stress Concern No    Weight Concern No    Special Diet No    Back Care No    Exercise No    Bike Helmet No    Seat Belt Yes    Self-Exams Yes     Social History Narrative      Family History Problem Relation Age of Onset    Diabetes Mother     Colon Polyps Mother     Dementia Mother     Heart Disease Father     Hypertension Brother        Review of Systems    Constitutional: negative   HEENT: negative   Respiratory: negative   Cardiovascular: negative   Gastrointestinal: negative   Genitourinary:negative   Hematologic/lymphatic: negative   Musculoskeletal:positive for knee pain, BLE edema   Neurological: negative   Behavioral/Psych: negative   Endocrine: negative   Allergic/Immunologic: negative      Physical Exam:    Visit Vitals    /86 (BP 1 Location: Left arm, BP Patient Position: Sitting)    Pulse 90    Resp 19    Ht 5' 9\" (1.753 m)    Wt 265 lb (120.2 kg)    BMI 39.13 kg/m2      General: Well-appearing female in no acute distress   HEENT: EOMI, no scleral icterus is noted. No carotid bruits are heard bilaterally   Cardiovascular: Regular rhythm normal S1-S2 no rubs murmurs or gallops   Pulmonary: No increased work or breathing is noted. Clear to auscultation bilaterally. No wheeze, rales or rhonchi. Abdomen: obese, nondistended. Extremities: Warm and well perfused bilaterally. Pt has 3+ pitting edema in the BLE. Unable to palpate pedal pulses due to edema but she does have multiphasic doppler signals throughout    Neuro: Cranial nerves II through XII are grossly intact   Integument: No ulcerations are identified visibly      Impression and Plan:  Arpan Garcia is a 65 y.o. female with bilateral lower extremity edema. She does have history of arthritis s/p bilateral TKR and is scheduled for right knee revision in April per Dr Cori Lazar. Discussed that we will order venous reflux ultrasound to assess for any underlying venous insufficiency which may be contributing to her swelling and she will f/u afterwards. I discussed the importance of good compliance with compression therapy and leg elevation as well. Further surgical discussion pending ultrasound results.  Agree with eval for restarting her diuretics as well. Plan was discussed. Patient expresses understanding and agrees. [address], [place]        PLEASE NOTE:  This document has been produced using voice recognition software. Unrecognized errors in transcription may be present.

## 2018-03-07 DIAGNOSIS — M65.9 SYNOVITIS: ICD-10-CM

## 2018-03-07 DIAGNOSIS — T84.012A FAILED TOTAL RIGHT KNEE REPLACEMENT, INITIAL ENCOUNTER (HCC): ICD-10-CM

## 2018-03-09 ENCOUNTER — OFFICE VISIT (OUTPATIENT)
Dept: VASCULAR SURGERY | Age: 63
End: 2018-03-09

## 2018-03-09 DIAGNOSIS — M79.89 LEG SWELLING: ICD-10-CM

## 2018-03-09 DIAGNOSIS — M65.9 SYNOVITIS: ICD-10-CM

## 2018-03-09 NOTE — PROCEDURES
[facility]  *** FINAL REPORT ***    Name: Jazmín Paulson  MRN: [id number]       Outpatient  : 1955  HIS Order #: [id number]  [id number] [facility] Visit #: [id number]  Date: 09 Mar 2018    TYPE OF TEST: Peripheral Venous Testing    REASON FOR TEST  Edema    Right Leg:-  Deep venous thrombosis:           No  Superficial venous thrombosis:    No  Deep venous insufficiency:        Yes  Superficial venous insufficiency: No    Left Leg:-  Deep venous thrombosis:           No  Superficial venous thrombosis:    No  Deep venous insufficiency:        No  Superficial venous insufficiency: No      INTERPRETATION/FINDINGS  Duplex images were obtained using 2-D gray scale, color flow and  spectral doppler analysis. The reflux exam was performed in the reverse   trendelenburg position. Bilateral reflux:  1. No evidence of deep vein thrombosis in the common femoral, proximal   deep femoral, femoral, popliteal, posterior tibial and peroneal veins   bilaterally. 2. Deep venous reflux of 0.92 seconds in the right common femoral  vein. 3. No evidence of great saphenous vein reflux bilaterally from the  sapheno-femoral junction to proximal calf. 4. No evidence of small saphenous vein reflux bilaterally in the  proximal, mid and distal segments. 5. Interstitial edema noted in both calves. 6. Biphasic posterior tibial artery flow bilaterally. ADDITIONAL COMMENTS    I have personally reviewed the data relevant to the interpretation of  this  study. TECHNOLOGIST: Susanne Ray RVT, RDMS  Signed: 2018 01:39 PM    PHYSICIAN: Erika Dhillon MD  Signed: 2018 08:53 AM

## 2018-03-22 ENCOUNTER — OFFICE VISIT (OUTPATIENT)
Dept: VASCULAR SURGERY | Age: 63
End: 2018-03-22

## 2018-03-22 VITALS
DIASTOLIC BLOOD PRESSURE: 90 MMHG | SYSTOLIC BLOOD PRESSURE: 150 MMHG | HEIGHT: 69 IN | BODY MASS INDEX: 39.25 KG/M2 | WEIGHT: 265 LBS | HEART RATE: 88 BPM | RESPIRATION RATE: 18 BRPM

## 2018-03-22 DIAGNOSIS — R60.0 BILATERAL LEG EDEMA: Primary | ICD-10-CM

## 2018-03-22 DIAGNOSIS — E66.01 SEVERE OBESITY (BMI 35.0-39.9) WITH COMORBIDITY (HCC): ICD-10-CM

## 2018-03-22 DIAGNOSIS — M17.0 OSTEOARTHRITIS OF BOTH KNEES, UNSPECIFIED OSTEOARTHRITIS TYPE: ICD-10-CM

## 2018-03-22 NOTE — PROGRESS NOTES
1. Have you been to an emergency room or urgent care clinic since your last visit? NO    Hospitalized since your last visit? NO    2. Have you seen or consulted any other health care providers outside of the [facility] since your last visit including any procedures, health maintenance items.   NO

## 2018-03-22 NOTE — PROGRESS NOTES
Pop Toledo    Chief Complaint   Patient presents with    Swelling       History and Physical    Pop Toledo is a 65 y.o. female who presents to the office today in follow-up for bilateral lower extremity edema. She did have venous reflux ultrasounds which were negative for DVT and did not show any significant venous insufficiency throughout the superficial system of veins bilaterally. She did have mild reflux in the right common femoral vein but otherwise her study was normal limits. She does have history of arthritis status post bilateral knee replacements and is scheduled for a revision of her right knee in April per Dr. Sherry Lynch. She denies any symptoms of claudication. She has no rest pain. She does state that she was seen by her primary care physician and is to restart her diuretics. 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 She states that the only time she elevates her legs is at nighttime as she runs busy all throughout the day and does admit that she stands most of the day. He denies any fevers or chills. No ulcers or skin changes. She does remain very compliant with compression stockings and wears them all day long until bedtime. Past Medical History:   Diagnosis Date    Arthritis     Elevated cholesterol     Hypertension     Joint pain     knees     Past Surgical History:   Procedure Laterality Date    HX HYSTERECTOMY      HX KNEE REPLACEMENT      HX ORTHOPAEDIC Right Feb, 2016    TKR    HX TONSILLECTOMY       Patient Active Problem List   Diagnosis Code    Arthritis M19.90    Knee pain M25.569    Mild chronic anemia D64.9    Impaired glucose tolerance R73.02    Essential hypertension I10    Obesity (BMI 30-39. 9) E66.9    Hyperlipidemia E78.5    Osteoarthritis, knee M17.10    Knee osteoarthritis M17.10    Severe obesity (BMI 35.0-39. 9) with comorbidity ([facility] [address] E66.01 Current Outpatient Prescriptions   Medication Sig Dispense Refill    amLODIPine (NORVASC) 10 mg tablet TAKE 1 TABLET BY MOUTH DAILY 90 Tab 3    atorvastatin (LIPITOR) 20 mg tablet TAKE 1 TABLET BY MOUTH DAILY 90 Tab 3    losartan-hydroCHLOROthiazide (HYZAAR) 100-25 mg per tablet TAKE 1 TABLET BY MOUTH DAILY. 90 Tab 3    allopurinol (ZYLOPRIM) 100 mg tablet Take 1 Tab by mouth two (2) times a day. 60 Tab 0    ferrous sulfate 325 mg (65 mg iron) tablet TAKE 1 TABLET BY MOUTH TWICE A DAY WITH MEALS 60 Tab 3    fexofenadine (ALLEGRA) 180 mg tablet Take 1 Tab by mouth daily. 30 Tab 3    tiZANidine (ZANAFLEX) 4 mg tablet TAKE 1 TAB BY MOUTH THREE (3) TIMES DAILY AS NEEDED. 90 Tab 0    Comp. Stocking,Knee,Regular,Lrg misc Use daily and take off at night, 20mmHg 1 Packet 0    polyethylene glycol (MIRALAX) 17 gram/dose powder Take as directed by office (Patient taking differently: as needed. Take as directed by office) 255 g 0    MULTIVIT-MIN/FA/CALCIUM/VIT K1 (ONE-A-DAY WOMEN'S 50+ PO) Take  by mouth daily.  morinda citrifolia fruit 250 mg cap Take 1 Cap by mouth daily.  Magnesium Oxide 500 mg cap Take  by mouth daily. No Known Allergies    Physical Exam:    Visit Vitals    /90 (BP 1 Location: Left arm, BP Patient Position: Sitting)    Pulse 88    Resp 18    Ht 5' 9\" (1.753 m)    Wt 265 lb (120.2 kg)    BMI 39.13 kg/m2      General: Well-appearing female in no acute distress   HEENT: EOMI, no scleral icterus is noted. Pulmonary: No increased work or breathing is noted. Abdomen: obese, nondistended. Extremities: Warm and well perfused bilaterally. Pt has +BLE edema. Compression stockings are in place today. Neuro: Cranial nerves II through XII are grossly intact   Integument: No ulcerations are identified visibly      Impression and Plan:  Curt Kumar is a 65 y.o. female with bilateral lower extremity edema. Imaging was reviewed with her in the office today as above.   I discussed with her that she is perfectly clear from a vascular standpoint to move forward with her knee revision in April. I did again discuss the importance of compliance with compression therapy and I encouraged her to try to find some time to get off of her feet and elevate them anytime that she can. She is also to restart her diuretics per her primary care physician. Discussed with her that she can follow-up in our office as needed and that we are here for her any time. Plan was discussed. Patient expresses understanding and agrees. [facility]  [phone]        PLEASE NOTE:  This document has been produced using voice recognition software. Unrecognized errors in transcription may be present.

## 2018-03-22 NOTE — MR AVS SNAPSHOT
[address] [facility] [address] 
 
 
 [address], [place] [address] [address] [facility] [address] 
[phone] Patient: Pop Toledo MRN: [id number] [facility]:[birth year] Visit Information Date & Time Provider Department Dept. Phone Encounter #  
 3/22/2018 10:45 AM [id number] [facility] [id number] Your Appointments 3/22/2018 10:45 AM  
Follow Up with DYLAN Garrett [facility] ([facility]) Appt Note: follow up after study; primary number is busy, secondary number vm not set up no msg left, ES; PATIENT CALLED TO CONFIRM APPT  
 [address] [facility] [place], [place] [address] [address] [facility] [address]  
[phone] [address]  
  
    
 3/23/2018 10:15 AM  
Follow Up with Valery Wadsworth MD  
[address] [place] [address], [address] [facility] [place] [address] ([facility]) Appt Note: lab work/3wk fu  
 [address], [address] [facility] [address]  
[phone] 2300 [facility]  
  
    
 3/29/2018 11:00 AM  
ROUTINE CARE with Nimo Montgomery NP [facility] Main Office ([facility]) Appt Note: 3 month follow up appt. for HTN, IGT, depression [address] [facility] [address] [facility] [address]  
[phone]  
  
   
 [address] [place] [address] [address] [facility]  
  
    
 4/23/2018  9:30 AM  
ROUTINE CARE with Nimo Montgomery NP [facility] Main Office ([facility]) Appt Note: pre op clearance, total right knee revision on 5/2, Dr. Sherry Lynch, [address] [place] [address]  
[phone]  
  
    
 4/24/2018  9:30 AM  
PROCEDURE with CHERYL Trejo [facility] [address] [facility]) Appt Note: RIGHT TOTAL KNEE REV  
 [address] [facility], [address] [facility] [address]  
[phone] [address], [address] [address]  
  
    
 4/25/2018  9:00 AM  
HISTORY AND PHYSICAL with Barbi Glover PA-C  
[facility]) Appt Note: RIGHT TOTAL KNEE REV  
 [address], [address] [place] [address]  
[phone]  
  
   
 [address], [address] [address] Upcoming Health Maintenance Date Due FOBT Q 1 YEAR AGE 50-75 7/23/2006 ZOSTER VACCINE AGE 60> 5/23/2016 BREAST CANCER SCRN MAMMOGRAM 7/26/2018 DTaP/Tdap/Td series (2 - Td) 10/31/2022 Allergies as of 3/22/2018  Review Complete On: 3/22/2018 By: Ector Dumont No Known Allergies Current Immunizations  Reviewed on 10/12/2016 Name Date Influenza Vaccine (Quad) PF 11/13/2017, 10/12/2016 10:52 AM, 2/1/2016 10:37 AM  
 Influenza Vaccine PF 12/11/2013 TDAP Vaccine 10/31/2012 12:56 AM  
  
 Not reviewed this visit Vitals BP Pulse Resp Height(growth percentile) Weight(growth percentile) BMI  
 150/90 (BP 1 Location: Left arm, BP Patient Position: Sitting) 88 18 5' 9\" (1.753 m) 265 lb (120.2 kg) 39.13 kg/m2 OB Status Smoking Status Hysterectomy Never Smoker Vitals History BMI and BSA Data Body Mass Index Body Surface Area  
 39.13 kg/m 2 2.42 m 2 Preferred Pharmacy Pharmacy Name Phone [id number]- [address] [phone] Your Updated Medication List  
  
   
This list is accurate as of 3/22/18 10:42 AM.  Always use your most recent med list.  
  
  
  
  
 allopurinol 100 mg tablet Commonly known as:  Mitcheal Lizeth Take 1 Tab by mouth two (2) times a day. amLODIPine 10 mg tablet Commonly known as:  Barbie Jonatan TAKE 1 TABLET BY MOUTH DAILY  
  
 atorvastatin 20 mg tablet Commonly known as:  LIPITOR  
TAKE 1 TABLET BY MOUTH DAILY Comp. [address] Use daily and take off at night, 20mmHg ferrous sulfate 325 mg (65 mg iron) tablet TAKE 1 TABLET BY MOUTH TWICE A DAY WITH MEALS  
  
 fexofenadine 180 mg tablet Commonly known as:  [place] Nice Take 1 Tab by mouth daily. losartan-hydroCHLOROthiazide 100-25 mg per tablet Commonly known as:  HYZAAR  
TAKE 1 TABLET BY MOUTH DAILY. Magnesium Oxide 500 mg Cap Take  by mouth daily. morinda citrifolia fruit 250 mg Cap Take 1 Cap by mouth daily. ONE-A-DAY WOMEN'S 50+ PO Take  by mouth daily. polyethylene glycol 17 gram/dose powder Commonly known as:  Melva Santos Take as directed by office  
  
 tiZANidine 4 mg tablet Commonly known as:  Saqib Kennedy TAKE 1 TAB BY MOUTH THREE (3) TIMES DAILY AS NEEDED. Introducing \[facility]\"" & HEALTH SERVICES! Denys Ferreira introduces [employer] patient portal. Now you can access parts of your medical record, email your doctor's office, and request medication refills online. 1. In your internet browser, go to [email or web address] [employer] 2. Click on the First Time User? Click Here link in the Sign In box. You will see the New Member Sign Up page. 3. Enter your [employer] Access Code exactly as it appears below. You will not need to use this code after youve completed the sign-up process. If you do not sign up before the expiration date, you must request a new code. · [employer] Access Code: [email or web address] Expires: 5/30/2018 12:02 PM 
 
4. Enter the last four digits of your Social Security Number (xxxx) and Date of Birth (mm/dd/yyyy) as indicated and click Submit. You will be taken to the next sign-up page. 5. Create a [employer] ID. This will be your [employer] login ID and cannot be changed, so think of one that is secure and easy to remember. 6. Create a [employer] password. You can change your password at any time. 7. Enter your Password Reset Question and Answer. This can be used at a later time if you forget your password. 8. Enter your e-mail address. You will receive e-mail notification when new information is available in [address] 9. Click Sign Up. You can now view and download portions of your medical record. 10. Click the Download Summary menu link to download a portable copy of your medical information. If you have questions, please visit the Frequently Asked Questions section of the [employer] website. Remember, [employer] is NOT to be used for urgent needs. For medical emergencies, dial 911. Now available from your iPhone and Android! Please provide this summary of care documentation to your next provider. Your primary care clinician is listed as Everardo Randolph. If you have any questions after today's visit, please call [phone].

## 2018-03-23 ENCOUNTER — OFFICE VISIT (OUTPATIENT)
Dept: ORTHOPEDIC SURGERY | Age: 63
End: 2018-03-23

## 2018-03-23 VITALS
TEMPERATURE: 97.2 F | BODY MASS INDEX: 39.25 KG/M2 | HEIGHT: 69 IN | WEIGHT: 265 LBS | SYSTOLIC BLOOD PRESSURE: 165 MMHG | OXYGEN SATURATION: 96 % | DIASTOLIC BLOOD PRESSURE: 81 MMHG | HEART RATE: 80 BPM

## 2018-03-23 DIAGNOSIS — M1A.09X0 CHRONIC GOUT OF MULTIPLE SITES, UNSPECIFIED CAUSE: ICD-10-CM

## 2018-03-23 DIAGNOSIS — G89.29 CHRONIC PAIN OF RIGHT KNEE: Primary | ICD-10-CM

## 2018-03-23 DIAGNOSIS — M25.561 CHRONIC PAIN OF RIGHT KNEE: Primary | ICD-10-CM

## 2018-03-23 NOTE — PROGRESS NOTES
Patient: Sergo Joel                MRN: [id number]       SSN: [id number]  YOB: 1955        AGE: 65 y.o. SEX: female  Body mass index is 39.13 kg/(m^2). PCP: Alyssia Cooper NP  03/23/18  HISTORY: Ranjith Watson returns in follow up. 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 We sent her for an infectious workup. It is, thankfully, negative. Her uric acid is high at 8.0. She denies fevers or chills. She is looking forward to a revision, and we discussed the nature of the revision. Hopefully, she will not need to wear a brace after surgery, but it is certainly possible. I am concerned about removing the tibial aspect of the replacement, as I think there will be a very large hole afterwards. She does have a sleeve in that region. PHYSICAL EXAMINATION:  With regards to the examination today, she bends to about 98° minus a couple degrees of extension. She has some heterotopic ossification in the patellofemoral articulation. Elicia's sign is negative. The calf is nontender. Both feet are warm and well perfused. There is just slight peripheral edema. Her BMI is 39. RADIOGRAPHS:  I did review her x-rays, which show a failed femoral component. PLAN:  At this point, I would like her to have some prehab. I would like to see the uric acid level come down a little bit from eight, and we will see her back for followup assessment in about three weeks time and see if we can increase the flexion a little bit. It will help with her rotating platform exposure during surgery. REVIEW OF SYSTEMS:      CON: negative for weight loss, fever  EYE: negative for double vision  ENT: negative for hoarseness  RS:   negative for Tb  GI:    negative for blood in stool  :  negative for blood in urine  Other systems reviewed and noted below.           Past Medical History:   Diagnosis Date    Arthritis     Elevated cholesterol     Hypertension     Joint pain     knees       Family History   Problem Relation Age of Onset    Diabetes Mother     Colon Polyps Mother     Dementia Mother     Heart Disease Father     Hypertension Brother        Current Outpatient Prescriptions   Medication Sig Dispense Refill    amLODIPine (NORVASC) 10 mg tablet TAKE 1 TABLET BY MOUTH DAILY 90 Tab 3    atorvastatin (LIPITOR) 20 mg tablet TAKE 1 TABLET BY MOUTH DAILY 90 Tab 3    losartan-hydroCHLOROthiazide (HYZAAR) 100-25 mg per tablet TAKE 1 TABLET BY MOUTH DAILY. 90 Tab 3    allopurinol (ZYLOPRIM) 100 mg tablet Take 1 Tab by mouth two (2) times a day. 60 Tab 0    ferrous sulfate 325 mg (65 mg iron) tablet TAKE 1 TABLET BY MOUTH TWICE A DAY WITH MEALS 60 Tab 3    fexofenadine (ALLEGRA) 180 mg tablet Take 1 Tab by mouth daily. 30 Tab 3    tiZANidine (ZANAFLEX) 4 mg tablet TAKE 1 TAB BY MOUTH THREE (3) TIMES DAILY AS NEEDED. 90 Tab 0    Comp. Stocking,Knee,Regular,Lrg misc Use daily and take off at night, 20mmHg 1 Packet 0    polyethylene glycol (MIRALAX) 17 gram/dose powder Take as directed by office (Patient taking differently: as needed. Take as directed by office) 255 g 0    MULTIVIT-MIN/FA/CALCIUM/VIT K1 (ONE-A-DAY WOMEN'S 50+ PO) Take  by mouth daily.  morinda citrifolia fruit 250 mg cap Take 1 Cap by mouth daily.  Magnesium Oxide 500 mg cap Take  by mouth daily. No Known Allergies    Past Surgical History:   Procedure Laterality Date    HX HYSTERECTOMY      HX KNEE REPLACEMENT      HX ORTHOPAEDIC Right Feb, 2016    TKR    HX TONSILLECTOMY         Social History     Social History    Marital status: SINGLE     Spouse name: N/A    Number of children: N/A    Years of education: N/A     Occupational History    Not on file.      Social History Main Topics    Smoking status: Never Smoker    Smokeless tobacco: Never Used    Alcohol use No    Drug use: No    Sexual activity: Yes     Partners: Male     Birth control/ protection: None     Other Topics Concern     Service No    Blood Transfusions No    Caffeine Concern Yes    Occupational Exposure No    Hobby Hazards No    Sleep Concern No    Stress Concern No    Weight Concern No    Special Diet No    Back Care No    Exercise No    Bike Helmet No    Seat Belt Yes    Self-Exams Yes     Social History Narrative       Visit Vitals    /81    Pulse 80    Temp 97.2 °F (36.2 °C) (Oral)    Ht 5' 9\" (1.753 m)    Wt 265 lb (120.2 kg)    SpO2 96%    BMI 39.13 kg/m2         PHYSICAL EXAMINATION:  GENERAL: Alert and oriented x3, in no acute distress, well-developed, well-nourished, afebrile. HEART: No JVD. EYES: No scleral icterus   NECK: No significant lymphadenopathy   LUNGS: No respiratory compromise or indrawing  ABDOMEN: Soft, non-tender, non-distended. Electronically signed by:  Dilan Fraga MD

## 2018-04-02 DIAGNOSIS — T84.012A FAILED TOTAL RIGHT KNEE REPLACEMENT, INITIAL ENCOUNTER (HCC): ICD-10-CM

## 2018-04-02 DIAGNOSIS — Z01.818 PREOP EXAMINATION: Primary | ICD-10-CM

## 2018-04-11 ENCOUNTER — HOSPITAL ENCOUNTER (OUTPATIENT)
Dept: PHYSICAL THERAPY | Age: 63
Discharge: HOME | End: 2018-04-11
Payer: COMMERCIAL

## 2018-04-11 PROCEDURE — 97161 PT EVAL LOW COMPLEX 20 MIN: CPT

## 2018-04-11 PROCEDURE — 97110 THERAPEUTIC EXERCISES: CPT

## 2018-04-11 NOTE — PROGRESS NOTES
[facility] [address], [address] [place] [address], [address],[place] [address]  [phone] fax      Plan of Care/ Statement of Necessity for Physical Therapy Services    Patient name: Negrito Last Start of Care: 2018   Referral source: Deepak Garza MD : 1955    Medical Diagnosis: Pain in right knee [M25.561]   Onset Date:2017    Treatment Diagnosis: decrease tolerance to ADLS and activities due to right knee pain,LOM, decrease strength, decrease functional mobility   Prior Hospitalization: see medical history Provider#: [id number]   Medications: Verified on Patient summary List    Comorbidities: B TKA,hearing impaired, HTN, gout   Prior Level of Function: : I all areas of ADLS and activities, working, caring for mom, household activities,community activities   The Plan of Care and following information is based on the information from the initial evaluation. Assessment/ key information: 64 YO female diagnosed as above and with S/S consistent with above diagnosis presents to skilled outpatient PT. CCO right knee pain and stiffness. Onset 2017 when she fell while caring for her mom. States she has had a TKA on the Right knee 2 years ago by Dr. Marylen Noun and he referred her to Dr. Sophie Edwards. She was told that the plastic knee cap from the surgery has cracked and the femoral component is loose. At times when walking she states the right LE will give out on her and she relies on utility cart for safety at work when walking. Pain 8/10 WORSE standing especially long periods, BETTER with sitting, also when sleeping , elevation, aleve. Previous Treatment/Compliance: following with ortho MDs, x-rays, brace issued, she is set to have surgery 18 and get a revision. Pain 8, FOTO 41, FALLS risk is low, no AD use, no LOB observed, Quad set right fair.  AROM Right knee supine Flex 105 and E 0 both with reports of right quad and ITBand tightness, MMT right hip F 5, knee F 4 and E 4-.    Right canvas brace and Antalgic gait pattern with no ambulatory AD  Describe:decrease pace, stance and stride right LE. Steps and stairs at this time now step to pattern.    Patient demonstrates the potential to make gains with improved ROM, strength, endurance/activity tolerance, functional FOTO survey score  and all within a reasonable time frame so as to increase their functional independence with ADLs and activities for carryover to  Improved quality of life, tolerance to work demands , household chores, community activities, caring for her mother. Patient requires skilled Physical Therapy so as to monitor their response to and modify their treatment plan accordingly.  Patient appears to be an appropriate candidate for skilled outpatient Physical Therapy.     Evaluation Complexity History MEDIUM  Complexity : 1-2 comorbidities / personal factors will impact the outcome/ POC ; Examination MEDIUM Complexity : 3 Standardized tests and measures addressing body structure, function, activity limitation and / or participation in recreation  ;Presentation LOW Complexity : Stable, uncomplicated  ;Clinical Decision Making MEDIUM Complexity : FOTO score of 26-74  Overall Complexity Rating: LOW   Problem List: pain affecting function, decrease ROM, decrease strength, impaired gait/ balance, decrease ADL/ functional abilitiies, decrease activity tolerance, decrease flexibility/ joint mobility, decrease transfer abilities and other FOTO 41   Treatment Plan may include any combination of the following: Therapeutic exercise, Therapeutic activities, Neuromuscular re-education, Physical agent/modality, Gait/balance training, Manual therapy, Patient education, Self Care training, Functional mobility training, Home safety training and Stair training  Patient / Family readiness to learn indicated by: asking questions, trying to perform skills and interest  Persons(s) to be included in education: patient (P)  Barriers to Learning/Limitations: None  Patient Goal (s): less pain  Patient Self Reported Health Status: good  Rehabilitation Potential: good    Short Term Goals: To be accomplished in 5 treatments:   1 patient will have established and be I with HEP to aid with progression of skilled PT program   EVAL issued   CURRENT   2 patient will have pain 6/10 to aid with increase tolerance to ADLS and activities   EVAL 8   CURRENT   3 patient will have FOTO improved to 50 so show increase tolerance to ADLS and activities   EVAL 41   CURRENT  Long Term Goals: To be accomplished in 18 treatments:   1 patient will have pain 4/10 to aid with increase tolerance to ADLS and activities   EVAL 8   CURRENT   2 patient will have FOTO improved to 55 so show increase tolerance to ADLS and activities   EVAL 41   CURRENT   3 patient will have MMT Right knee F/E 4+ to aid with increase stability for work demands   EVAL Right knee F 4 and E 4-   CURRENT   4 patient will ambulate 1/4 mile and no increase pain for carryover to her work demands   EVAL short distances with decrease pace, decrease stance and swing Right LE, antalgic pattern   CURRENT  Frequency / Duration: Patient to be seen 2-3 times per week for 18 treatments. PENDING SURGERY 5/2/18  Patient/ Caregiver education and instruction: Diagnosis, prognosis, self care, activity modification, brace/ splint application and exercises   [x]  Plan of care has been reviewed with PATSY Montalvo, PT 4/11/2018 10:14 AM  ________________________________________________________________________    I certify that the above Therapy Services are being furnished while the patient is under my care. I agree with the treatment plan and certify that this therapy is necessary.     Physician's Signature:____________________  Date:____________Time: _________    Please sign and return to In Lisseth Lorenzo Ksaweredanielle [address] [place] [address], [address] [place] [address],[facility] 300  [phone] fax

## 2018-04-11 NOTE — PROGRESS NOTES
PT DAILY TREATMENT NOTE/KNEE EVAL 3-16    Patient Name: Tawny Seo  Date:2018  : 1955  [x]  Patient  Verified  Payor: Harshil Novoa / Plan: [place] [employer]  CAPITATED PT / Product Type: Commerical /    In time:1012  Out time:1053  Total Treatment Time (min): 41  Total Timed Codes (min): 8  1:1 Treatment Time ( only): 8   Visit #: 1  18    Treatment Area: Pain in right knee [M25.561]    SUBJECTIVE  Pain Level (0-10 scale): 8  []constant [x]intermittent []improving []worsening [x]no change since onset  WORSE standing especially long periods, BETTER with sitting, also when sleeping , elevation, aleve  Any medication changes, allergies to medications, adverse drug reactions, diagnosis change, or new procedure performed?: [x] No    [] Yes (see summary sheet for update)  Subjective functional status/changes:     PLOF: I all areas of ADLS and activities, working, caring for mom, household activities,community activities  Limitations to PLOF: Pain and stiffness right knee  Mechanism of Injury: 2017 fell while caring for her mom  Current symptoms/Complaints: 66 YO female diagnosed as above and with S/S consistent with above diagnosis presents to skilled outpatient PT. CCO right knee pain and stiffness. Onset 2017 when she fell while caring for her mom. States she has had a TKA on the Right knee 2 years ago by Dr. Portia Echevarria and he referred her to Dr. Martin Caraballo. She was told that the plastic knee cap from the surgery has cracked and the femoral component is loose. At times when walking she states the right LE will give out on her and she relies on utility cart for safety at work when walking. Pain 8/10 WORSE standing especially long periods, BETTER with sitting, also when sleeping , elevation, aleve. Previous Treatment/Compliance: following with ortho MDs, x-rays, brace issued, she is set to have surgery 18 and get a revision.    PMHx/Surgical Hx: B TKA,hearing impaired, HTN, gout  Work Hx:  at [employer] Full time  Living Situation: lives in a townhouse with no steps to enter, lives with family  Pt Goals: less pain  Barriers: [x]pain []financial []time []transportation []other  Motivation: good  Substance use: []Alcohol []Tobacco []other:   FABQ Score: []low []elevate  Cognition: A & O x 7[birth year]      Other:    OBJECTIVE/EXAMINATION  Domestic Life: work, household chores, community activities, caring for her mother  Activity/Recreational Limitations: Knee pain and stiffness right  Mobility: I with right knee brace  Self Care:  I        Modality rationale:     Min Type Additional Details    [] Estim:  []Unatt       []IFC  []Premod                        []Other:  []w/ice   []w/heat  Position:  Location:    [] Estim: []Att    []TENS instruct  []NMES                    []Other:  []w/US   []w/ice   []w/heat  Position:  Location:    []  Traction: [] Cervical       []Lumbar                       [] Prone          []Supine                       []Intermittent   []Continuous Lbs:  [] before manual  [] after manual    []  Ultrasound: []Continuous   [] Pulsed                           []1MHz   []3MHz Location:  W/cm2:    []  Iontophoresis with dexamethasone         Location: [] Take home patch   [] In clinic    []  Ice     []  heat  []  Ice massage  []  Laser   []  Anodyne Position:  Location:    []  Laser with stim  []  Other: Position:  Location:    []  Vasopneumatic Device Pressure:       [] lo [] med [] hi   Temperature: [] lo [] med [] hi   [] Skin assessment post-treatment:  []intact []redness- no adverse reaction    []redness  adverse reaction:     33 min [x]Eval                  []Re-Eval       8 min Therapeutic Exercise:  [x] See flow sheet :   Rationale: increase ROM, increase strength and improve coordination to improve the patients ability to aid with increase tolerance to ADLS and activities     min Therapeutic Activity:  []  See flow sheet :   Rationale:   to improve the patients ability to       min Neuromuscular Re-education:  []  See flow sheet :   Rationale:   to improve the patients ability to      min Manual Therapy:     Rationale:  to      min Gait Training:  ___ feet with ___ device on level surfaces with ___ level of assist   Rationale: With   [] TE   [] TA   [] neuro   [] other: Patient Education: [x] Review HEP    [] Progressed/Changed HEP based on:   [] positioning   [] body mechanics   [] transfers   [] heat/ice application    [] other:      Other Objective/Functional Measures: FALLS risk is low, no AD use, no LOB observed  Quad set right fair    Physical Therapy Evaluation - Knee    Posture: [] Varus    [] Valgus    [] Recurvatum        [] Tibial Torsion    [] Foot Supination    [] Foot Pronation    Describe:    Gait:  [] Normal    [] Abnormal    [x] Antalgic    [] NWB    Device:Right canvas brace    Describe:decrease pace, stance and stride right LE. Steps and stairs at this time now step to pattern.      ROM / Strength  [] Unable to assess                  AROM                      PROM                   Strength (1-5)    Left Right Left Right Left Right   Hip Flexion [facility] WFL   5 5    Extension          Abduction          Adduction         Knee Flexion 95 sitting  95 supine 95 sitting tightness in the Quad/ITB  105 supine     5 4    Extension -3 sitting -25 sitting  Tightness in the Quad/ITB  0 supine    5 4-   Ankle Plantarflexion          Dorsiflexion             Flexibility: [] Unable to assess at this time  Hamstrings:    (L) Tightness= [] WNL   [] Min   [] Mod   [] Severe    (R) Tightness= [] WNL   [] Min   [] Mod   [] Severe  Quadriceps:    (L) Tightness= [] WNL   [] Min   [] Mod   [] Severe    (R) Tightness= [] WNL   [] Min   [] Mod   [] Severe  Gastroc:      (L) Tightness= [] WNL   [] Min   [] Mod   [] Severe    (R) Tightness= [] WNL   [] Min   [] Mod   [] Severe  Other:    Palpation:   Neg/Pos  Neg/Pos  Neg/Pos   Joint Line  Quad tendon  Patellar ligament Patella  Fibular head  Pes Anserinus    Tibial tubercle  Hamstring tendons  Infrapatellar fat pad      Optional Tests:  Patellar Positioning (Static)   []L []R Normal []L []R Lateral   []L []R Tanda Cris      []L []R Medial   []L []R [facility] [place]    Patellar Tracking   []L []R Glide (Lat)   []L []R Tilt (Lat)     []L []R Glide (Med)  []L []R Tilt (Med)      []L []R Tile (Inf)     Patellar Mobility   []L []R Hypermobile []L []R Hypomobile         Girth Measurements:     Cm at  Cm above joint line   Cm at   Cm below joint line  Cm at joint line   Left        Right           Lachmans  [] Neg    [] Pos Posterior Drawer [] Neg    [] Pos  Pivot Shift  [] Neg    [] Pos Posterior Sag  [] Neg    [] Pos  PHILLY   [] Neg    [] Pos Carlos's Test [] Neg    [] Pos  ALRI   [] Neg    [] Pos Squat   [] Neg    [] Pos  Valgus@ 0 Degrees [] Neg    [] Pos Dimitri-Deni [] Neg    [] Pos  Valgus@ 30 Degrees [] Neg    [] Pos Patellar Apprehension [] Neg    [] Pos  Varus@ 0 Degrees [] Neg    [] Pos Nicolas's Compression [] Neg    [] Pos  Varus@ 30 Degrees [] Neg    [] Pos Ely's Test  [] Neg    [] Pos  Apley's Compression [] Neg    [] Pos Katherine's Test  [] Neg    [] Pos  Apley's Distraction [] Neg    [] Pos Stroke Test  [] Neg    [] Pos   Anterior Drawer [] Neg    [] Pos Fluctuation Test [] Neg    [] Pos  Other:                  [] Neg    [] Pos                 Other tests/comments:       Pain Level (0-10 scale) post treatment: 8    ASSESSMENT/Changes in Function: Patient demonstrates the potential to make gains with improved ROM, strength, endurance/activity tolerance, functional FOTO survey score  and all within a reasonable time frame so as to increase their functional independence with ADLs and activities for carryover to  Improved quality of life, tolerance to work demands , household chores, community activities, caring for her mother.  Patient requires skilled Physical Therapy so as to monitor their response to and modify their treatment plan accordingly. Patient appears to be an appropriate candidate for skilled outpatient Physical Therapy. Patient will continue to benefit from skilled PT services to modify and progress therapeutic interventions, address functional mobility deficits, address ROM deficits, address strength deficits, analyze and address soft tissue restrictions, analyze and cue movement patterns, analyze and modify body mechanics/ergonomics, assess and modify postural abnormalities and instruct in home and community integration to attain remaining goals.      [x]  See Plan of Care  []  See progress note/recertification  []  See Discharge Summary         Progress towards goals / Updated goals:       PLAN  [x]  Upgrade activities as tolerated     [x]  Continue plan of care  []  Update interventions per flow sheet       []  Discharge due to:_  []  Other:_      Aisha Kwon, PT 4/11/2018  10:14 AM

## 2018-04-17 ENCOUNTER — OFFICE VISIT (OUTPATIENT)
Dept: FAMILY MEDICINE CLINIC | Facility: CLINIC | Age: 63
End: 2018-04-17

## 2018-04-17 VITALS
WEIGHT: 254 LBS | OXYGEN SATURATION: 97 % | HEIGHT: 69 IN | DIASTOLIC BLOOD PRESSURE: 82 MMHG | TEMPERATURE: 98 F | BODY MASS INDEX: 37.62 KG/M2 | SYSTOLIC BLOOD PRESSURE: 145 MMHG | HEART RATE: 78 BPM | RESPIRATION RATE: 14 BRPM

## 2018-04-17 DIAGNOSIS — M17.12 OSTEOARTHRITIS OF LEFT KNEE, UNSPECIFIED OSTEOARTHRITIS TYPE: Primary | ICD-10-CM

## 2018-04-17 DIAGNOSIS — Z12.39 SCREENING FOR MALIGNANT NEOPLASM OF BREAST: ICD-10-CM

## 2018-04-17 DIAGNOSIS — E66.01 SEVERE OBESITY (BMI 35.0-39.9) WITH COMORBIDITY (HCC): ICD-10-CM

## 2018-04-17 DIAGNOSIS — F32.A DEPRESSION, UNSPECIFIED DEPRESSION TYPE: ICD-10-CM

## 2018-04-17 DIAGNOSIS — I10 ESSENTIAL HYPERTENSION: ICD-10-CM

## 2018-04-17 RX ORDER — CITALOPRAM 20 MG/1
TABLET, FILM COATED ORAL
Refills: 3 | COMMUNITY
Start: 2018-03-05 | End: 2018-08-17

## 2018-04-17 NOTE — PROGRESS NOTES
HISTORY OF PRESENT ILLNESS  Barry Mendoza is a 65 y.o. female. HPI Comments: HTN: stable/improving. Denies any leg swelling or palpitations. Compliant with medication therapy at all times. BP is not measured at home. Depression: she is taking care of her mother who has dementia. Reports sleeping better now. She is getting some help with her mother from her sisters and other family members. Denies any SI or HI. Obesity: attempting to follow a diet and exercise program. Exercise is limited d/t knee pain. Knee pain: scheduled for a RT TKR by Dr Maryan Winters on 5/2/18    Hypertension    The history is provided by the patient. This is a chronic problem. The current episode started more than 1 week ago. The problem has been gradually improving. Risk factors include obesity, family history, dyslipidemia and stress. Review of Systems   Constitutional: Negative. HENT: Negative. Respiratory: Negative. Cardiovascular: Negative. Genitourinary: Negative. Musculoskeletal: Positive for joint pain. Neurological: Negative. Past Medical History:   Diagnosis Date    Arthritis     Elevated cholesterol     Hypertension     Joint pain     knees     Past Surgical History:   Procedure Laterality Date    HX HYSTERECTOMY      HX KNEE REPLACEMENT      HX ORTHOPAEDIC Right Feb, 2016    TKR    HX TONSILLECTOMY       Current Outpatient Prescriptions on File Prior to Visit   Medication Sig Dispense Refill    amLODIPine (NORVASC) 10 mg tablet TAKE 1 TABLET BY MOUTH DAILY 90 Tab 3    atorvastatin (LIPITOR) 20 mg tablet TAKE 1 TABLET BY MOUTH DAILY 90 Tab 3    losartan-hydroCHLOROthiazide (HYZAAR) 100-25 mg per tablet TAKE 1 TABLET BY MOUTH DAILY. 90 Tab 3    allopurinol (ZYLOPRIM) 100 mg tablet Take 1 Tab by mouth two (2) times a day.  60 Tab 0    ferrous sulfate 325 mg (65 mg iron) tablet TAKE 1 TABLET BY MOUTH TWICE A DAY WITH MEALS 60 Tab 3    fexofenadine (ALLEGRA) 180 mg tablet Take 1 Tab by mouth daily. 30 Tab 3    tiZANidine (ZANAFLEX) 4 mg tablet TAKE 1 TAB BY MOUTH THREE (3) TIMES DAILY AS NEEDED. 90 Tab 0    Comp. Stocking,Knee,Regular,Lrg misc Use daily and take off at night, 20mmHg 1 Packet 0    polyethylene glycol (MIRALAX) 17 gram/dose powder Take as directed by office (Patient taking differently: as needed. Take as directed by office) 255 g 0    MULTIVIT-MIN/FA/CALCIUM/VIT K1 (ONE-A-DAY WOMEN'S 50+ PO) Take  by mouth daily.  morinda citrifolia fruit 250 mg cap Take 1 Cap by mouth daily.  Magnesium Oxide 500 mg cap Take  by mouth daily. No current facility-administered medications on file prior to visit. Allergies and Intolerances:   No Known Allergies    Family History:   Family History   Problem Relation Age of Onset    Diabetes Mother     Colon Polyps Mother     Dementia Mother     Heart Disease Father     Hypertension Brother        Social History:   She  reports that she has never smoked. She has never used smokeless tobacco. She  reports that she does not drink alcohol. Vitals:   Visit Vitals    /82    Pulse 78    Temp 98 °F (36.7 °C)    Resp 14    Ht 5' 9\" (1.753 m)    Wt 254 lb (115.2 kg)    SpO2 97%    BMI 37.51 kg/m2     Body surface area is 2.37 meters squared. Physical Exam   Constitutional: She is oriented to person, place, and time. She appears well-developed and well-nourished. HENT:   Head: Atraumatic. Eyes: Pupils are equal, round, and reactive to light. Cardiovascular: Normal rate, regular rhythm and normal heart sounds. Pulmonary/Chest: Effort normal and breath sounds normal.   Neurological: She is alert and oriented to person, place, and time. Skin: Skin is warm. Psychiatric: She has a normal mood and affect. Her behavior is normal.   Nursing note and vitals reviewed. ASSESSMENT and PLAN    ICD-10-CM ICD-9-CM    1. Osteoarthritis of left knee, unspecified osteoarthritis type M17.12 715.96    2.  Severe obesity (BMI 35.0-39. 9) with comorbidity ([facility] [address] E66.01 278.01    3. Essential hypertension I10 401.9    4. Screening for malignant neoplasm of breast Z12.31 V76.10 PURVI MAMMO BI SCREENING INCL CAD   5. Depression, unspecified depression type F32.9 311      Follow-up Disposition:  Return in about 4 months (around 8/17/2018), or if symptoms worsen or fail to improve, for HTN, IGT- A1c at next visit. .  cardiovascular risk and specific lipid/LDL goals reviewed  reviewed medications and side effects in detail    - Alarm signals discussed. ER precautions  - Plan of care reviewed with patient. Understanding verbalized and they are in agreement with plan of care.

## 2018-04-17 NOTE — PATIENT INSTRUCTIONS

## 2018-04-17 NOTE — PROGRESS NOTES
1. Have you been to the ER, urgent care clinic since your last visit? Hospitalized since your last visit? No    2. Have you seen or consulted any other health care providers outside of the [address] [place] [address] since your last visit? Include any pap smears or colon screening. No     Patient is schedule to have right knee surgery on 05/02/18 , Dr Samira Saenz @ [facility]

## 2018-04-17 NOTE — MR AVS SNAPSHOT
[facility] 
 
 
 [address] [facility] [address] [facility] [address] 
[phone] Patient: Emma Cao MRN: [id number] Visit Information Date & Time Provider Department Dept. Phone Encounter #  
 4/17/2018  9:45 AM Christine Rodriguez NP [employer] [phone] [id number] Follow-up Instructions Return in about 4 months (around 8/17/2018), or if symptoms worsen or fail to improve, for HTN, IGT- A1c at next visit. .  
  
Your Appointments 4/23/2018  9:30 AM  
ROUTINE CARE with Christine Rodriguez NP [employer] [facility] Main Office ([address] [place] [address]) Appt Note: pre op clearance, total right knee revision on 5/2, Dr. Jeremy Washington, [address] [facility] [address]  
[phone]  
  
   
 [address] [place] [address] [address] [facility]  
  
    
 4/24/2018  9:30 AM  
PROCEDURE with Krysta Thomas PA-C  
[facility] - [address]) Appt Note: RIGHT TOTAL KNEE REV  
 [address], [address] [place] [address]  
[phone]  
  
   
 [address], [address] [address]  
  
    
 4/25/2018  9:30 AM  
HISTORY AND PHYSICAL with Krysta Thomas PA-C  
[facility] - [address]) Appt Note: RIGHT TOTAL KNEE REV; RIGHT TOTAL KNEE REVISION  
 [address] [facility], [address] [facility] [address]  
[phone]  
  
   
 [address], [address] [address] Upcoming Health Maintenance Date Due FOBT Q 1 YEAR AGE 50-75 7/23/2006 ZOSTER VACCINE AGE 60> 5/23/2016 BREAST CANCER SCRN MAMMOGRAM 7/26/2018 DTaP/Tdap/Td series (2 - Td) 10/31/2022 Allergies as of 4/17/2018  Review Complete On: 4/17/2018 By: Cecelia Ugarte No Known Allergies Current Immunizations  Reviewed on 10/12/2016 Name Date  Influenza Vaccine (Quad) PF 11/13/2017, 10/12/2016 10:52 AM, 2/1/2016 10:37 AM  
 Influenza Vaccine PF 12/11/2013 TDAP Vaccine 10/31/2012 12:56 AM  
  
 Not reviewed this visit You Were Diagnosed With   
  
 Codes Comments Osteoarthritis of left knee, unspecified osteoarthritis type    -  Primary ICD-10-CM: M17.12 
ICD-9-CM: 715.96 Severe obesity (BMI 35.0-39. 9) with comorbidity ([address]     ICD-10-CM: E66.01 
ICD-9-CM: 278.01 Essential hypertension     ICD-10-CM: I10 
ICD-9-CM: 401.9 Screening for malignant neoplasm of breast     ICD-10-CM: Z12.31 
ICD-9-CM: V76.10 Vitals BP Pulse Temp Resp Height(growth percentile) Weight(growth percentile) 145/82 78 98 °F (36.7 °C) 14 5' 9\" (1.753 m) 254 lb (115.2 kg) SpO2 BMI OB Status Smoking Status 97% 37.51 kg/m2 Hysterectomy Never Smoker Vitals History BMI and BSA Data Body Mass Index Body Surface Area  
 37.51 kg/m 2 2.37 m 2 Preferred Pharmacy Pharmacy Name Phone [id number]  [phone] Your Updated Medication List  
  
   
This list is accurate as of 4/17/18 10:32 AM.  Always use your most recent med list.  
  
  
  
  
 allopurinol 100 mg tablet Commonly known as:  Denys May Take 1 Tab by mouth two (2) times a day. amLODIPine 10 mg tablet Commonly known as:  Landers Railing TAKE 1 TABLET BY MOUTH DAILY  
  
 atorvastatin 20 mg tablet Commonly known as:  LIPITOR  
TAKE 1 TABLET BY MOUTH DAILY  
  
 citalopram 20 mg tablet Commonly known as:  CELEXA  
TAKE 1 TAB BY MOUTH DAILY. Comp. [address] Use daily and take off at night, 20mmHg  
  
 ferrous sulfate 325 mg (65 mg iron) tablet TAKE 1 TABLET BY MOUTH TWICE A DAY WITH MEALS  
  
 fexofenadine 180 mg tablet Commonly known as:  Anu Stallion Take 1 Tab by mouth daily. losartan-hydroCHLOROthiazide 100-25 mg per tablet Commonly known as:  HYZAAR  
TAKE 1 TABLET BY MOUTH DAILY. Magnesium Oxide 500 mg Cap Take  by mouth daily. morinda citrifolia fruit 250 mg Cap Take 1 Cap by mouth daily. ONE-A-DAY WOMEN'S 50+ PO Take  by mouth daily. polyethylene glycol 17 gram/dose powder Commonly known as:  Ferol Boom Take as directed by office  
  
 tiZANidine 4 mg tablet Commonly known as:  Derinda Dole TAKE 1 TAB BY MOUTH THREE (3) TIMES DAILY AS NEEDED. Follow-up Instructions Return in about 4 months (around 8/17/2018), or if symptoms worsen or fail to improve, for HTN, IGT- A1c at next visit. Yfn Bennett To-Do List   
 04/18/2018 9:00 AM  
  Appointment with [facility] PAT ROOM P1 at [address] ([phone])  
  
 07/31/2018 Imaging:  PURVI MAMMO BI SCREENING INCL CAD Patient Instructions DASH Diet: Care Instructions Your Care Instructions The DASH diet is an eating plan that can help lower your blood pressure. DASH stands for Dietary Approaches to Stop Hypertension. Hypertension is high blood pressure. The DASH diet focuses on eating foods that are high in calcium, potassium, and magnesium. These nutrients can lower blood pressure. The foods that are highest in these nutrients are fruits, vegetables, low-fat dairy products, nuts, seeds, and legumes. But taking calcium, potassium, and magnesium supplements instead of eating foods that are high in those nutrients does not have the same effect. The DASH diet also includes whole grains, fish, and poultry. The DASH diet is one of several lifestyle changes your doctor may recommend to lower your high blood pressure. Your doctor may also want you to decrease the amount of sodium in your diet. Lowering sodium while following the DASH diet can lower blood pressure even further than just the DASH diet alone. Follow-up care is a key part of your treatment and safety.  Be sure to make and go to all appointments, and call your doctor if you are having problems. It's also a good idea to know your test results and keep a list of the medicines you take. How can you care for yourself at home? Following the DASH diet · Eat 4 to 5 servings of fruit each day. A serving is 1 medium-sized piece of fruit, ½ cup chopped or canned fruit, 1/4 cup dried fruit, or 4 ounces (½ cup) of fruit juice. Choose fruit more often than fruit juice. · Eat 4 to 5 servings of vegetables each day. A serving is 1 cup of lettuce or raw leafy vegetables, ½ cup of chopped or cooked vegetables, or 4 ounces (½ cup) of vegetable juice. Choose vegetables more often than vegetable juice. · Get 2 to 3 servings of low-fat and fat-free dairy each day. A serving is 8 ounces of milk, 1 cup of yogurt, or 1 ½ ounces of cheese. · Eat 6 to 8 servings of grains each day. A serving is 1 slice of bread, 1 ounce of dry cereal, or ½ cup of cooked rice, pasta, or cooked cereal. Try to choose whole-grain products as much as possible. · Limit lean meat, poultry, and fish to 2 servings each day. A serving is 3 ounces, about the size of a deck of cards. · Eat 4 to 5 servings of nuts, seeds, and legumes (cooked dried beans, lentils, and split peas) each week. A serving is 1/3 cup of nuts, 2 tablespoons of seeds, or ½ cup of cooked beans or peas. · Limit fats and oils to 2 to 3 servings each day. A serving is 1 teaspoon of vegetable oil or 2 tablespoons of salad dressing. · Limit sweets and added sugars to 5 servings or less a week. A serving is 1 tablespoon jelly or jam, ½ cup sorbet, or 1 cup of lemonade. · Eat less than 2,300 milligrams (mg) of sodium a day. If you limit your sodium to 1,500 mg a day, you can lower your blood pressure even more. Tips for success · Start small. Do not try to make dramatic changes to your diet all at once. You might feel that you are missing out on your favorite foods and then be more likely to not follow the plan.  Make small changes, and stick with them. Once those changes become habit, add a few more changes. · Try some of the following: ¨ Make it a goal to eat a fruit or vegetable at every meal and at snacks. This will make it easy to get the recommended amount of fruits and vegetables each day. ¨ Try yogurt topped with fruit and nuts for a snack or healthy dessert. ¨ Add lettuce, tomato, cucumber, and onion to sandwiches. ¨ Combine a ready-made pizza crust with low-fat mozzarella cheese and lots of vegetable toppings. Try using tomatoes, squash, spinach, broccoli, carrots, cauliflower, and onions. ¨ Have a variety of cut-up vegetables with a low-fat dip as an appetizer instead of chips and dip. ¨ Sprinkle sunflower seeds or chopped almonds over salads. Or try adding chopped walnuts or almonds to cooked vegetables. ¨ Try some vegetarian meals using beans and peas. Add garbanzo or kidney beans to salads. Make burritos and tacos with mashed brown beans or black beans. Where can you learn more? Go to http://nahid-maureen.info/. Enter L787 in the search box to learn more about \"DASH Diet: Care Instructions. \" Current as of: September 21, 2016 Content Version: 11.4 © 2030-4764 Healthwise, [employer]. Care instructions adapted under license by [employer] (which disclaims liability or warranty for this information). If you have questions about a medical condition or this instruction, always ask your healthcare professional. [place] [address] any warranty or liability for your use of this information. Introducing [facility]! [facility] introduces [employer]elks patient portal. Now you can access parts of your medical record, email your doctor's office, and request medication refills online. 1. In your internet browser, go to [email or web address] "[email or web address] 2. Click on the First Time User? Click Here link in the Sign In box. You will see the New Member Sign Up page. 3. Enter your [employer] Infuse Access Code exactly as it appears below. You will not need to use this code after youve completed the sign-up process. If you do not sign up before the expiration date, you must request a new code. · [employer] Access Code: [email or web address] Expires: 5/30/2018 12:02 PM 
 
4. Enter the last four digits of your Social Security Number (xxxx) and Date of Birth (mm/dd/yyyy) as indicated and click Submit. You will be taken to the next sign-up page. 5. Create a [employer] Infuse ID. This will be your [employer] login ID and cannot be changed, so think of one that is secure and easy to remember. 6. Create a [employer] password. You can change your password at any time. 7. Enter your Password Reset Question and Answer. This can be used at a later time if you forget your password. 8. Enter your e-mail address. You will receive e-mail notification when new information is available in [address] 9. Click Sign Up. You can now view and download portions of your medical record. 10. Click the Download Summary menu link to download a portable copy of your medical information. If you have questions, please visit the Frequently Asked Questions section of the [employer] website. Remember, [employer] is NOT to be used for urgent needs. For medical emergencies, dial 911. Now available from your iPhone and Android! Please provide this summary of care documentation to your next provider. Your primary care clinician is listed as Valentino Knack. If you have any questions after today's visit, please call [phone].

## 2018-04-18 ENCOUNTER — HOSPITAL ENCOUNTER (OUTPATIENT)
Dept: PREADMISSION TESTING | Age: 63
Discharge: HOME | End: 2018-04-18
Payer: COMMERCIAL

## 2018-04-18 ENCOUNTER — HOSPITAL ENCOUNTER (OUTPATIENT)
Dept: LAB | Age: 63
Discharge: HOME | End: 2018-04-18

## 2018-04-18 ENCOUNTER — HOSPITAL ENCOUNTER (OUTPATIENT)
Dept: GENERAL RADIOLOGY | Age: 63
Discharge: HOME | End: 2018-04-18
Payer: COMMERCIAL

## 2018-04-18 DIAGNOSIS — T84.012A FAILED TOTAL RIGHT KNEE REPLACEMENT, INITIAL ENCOUNTER (HCC): ICD-10-CM

## 2018-04-18 DIAGNOSIS — Z01.818 PREOP EXAMINATION: ICD-10-CM

## 2018-04-18 LAB
ABO + RH BLD: NORMAL
ATRIAL RATE: 91 BPM
BLOOD GROUP ANTIBODIES SERPL: NORMAL
CALCULATED P AXIS, ECG09: 60 DEGREES
CALCULATED R AXIS, ECG10: 17 DEGREES
CALCULATED T AXIS, ECG11: 44 DEGREES
DIAGNOSIS, 93000: NORMAL
P-R INTERVAL, ECG05: 152 MS
Q-T INTERVAL, ECG07: 370 MS
QRS DURATION, ECG06: 88 MS
QTC CALCULATION (BEZET), ECG08: 455 MS
SENTARA SPECIMEN COL,SENBCF: NORMAL
SPECIMEN EXP DATE BLD: NORMAL
VENTRICULAR RATE, ECG03: 91 BPM

## 2018-04-18 PROCEDURE — 71046 X-RAY EXAM CHEST 2 VIEWS: CPT

## 2018-04-18 PROCEDURE — 93005 ELECTROCARDIOGRAM TRACING: CPT

## 2018-04-18 PROCEDURE — 86900 BLOOD TYPING SEROLOGIC ABO: CPT | Performed by: PHYSICIAN ASSISTANT

## 2018-04-18 PROCEDURE — 99001 SPECIMEN HANDLING PT-LAB: CPT | Performed by: PHYSICIAN ASSISTANT

## 2018-04-19 ENCOUNTER — HOSPITAL ENCOUNTER (OUTPATIENT)
Dept: MAMMOGRAPHY | Age: 63
Discharge: HOME | End: 2018-04-19
Attending: NURSE PRACTITIONER
Payer: COMMERCIAL

## 2018-04-19 DIAGNOSIS — Z12.39 SCREENING FOR MALIGNANT NEOPLASM OF BREAST: ICD-10-CM

## 2018-04-19 PROCEDURE — 77063 BREAST TOMOSYNTHESIS BI: CPT

## 2018-04-19 NOTE — PROGRESS NOTES
Real Messer attended the Joint Replacement Pre-Operative class on 4/18/18. Topics discussed included surgery preparation, what to expect the day of surgery, medications, physical and occupational therapy, and discharge planning. It was discussed that this is considered an elective surgery and that prior to the surgery they need to make decisions such as arranging for help at home once they are discharged. She was given a knee patient education notebook to take home. Opportunity was given to ask questions and phone number of the Orthopaedic Nurse Clinician was given for any questions or concerns that may arise later.

## 2018-04-23 ENCOUNTER — OFFICE VISIT (OUTPATIENT)
Dept: FAMILY MEDICINE CLINIC | Facility: CLINIC | Age: 63
End: 2018-04-23

## 2018-04-23 VITALS
BODY MASS INDEX: 38.06 KG/M2 | HEART RATE: 78 BPM | RESPIRATION RATE: 12 BRPM | OXYGEN SATURATION: 98 % | DIASTOLIC BLOOD PRESSURE: 90 MMHG | SYSTOLIC BLOOD PRESSURE: 165 MMHG | HEIGHT: 69 IN | WEIGHT: 257 LBS | TEMPERATURE: 98.4 F

## 2018-04-23 DIAGNOSIS — E66.9 OBESITY (BMI 30-39.9): ICD-10-CM

## 2018-04-23 DIAGNOSIS — G89.29 CHRONIC PAIN OF RIGHT KNEE: Primary | ICD-10-CM

## 2018-04-23 DIAGNOSIS — R73.02 IMPAIRED GLUCOSE TOLERANCE: ICD-10-CM

## 2018-04-23 DIAGNOSIS — Z01.818 PRE-OPERATIVE CLEARANCE: ICD-10-CM

## 2018-04-23 DIAGNOSIS — M25.561 CHRONIC PAIN OF RIGHT KNEE: Primary | ICD-10-CM

## 2018-04-23 NOTE — PROGRESS NOTES
Preoperative Evaluation    Date of Exam: 4/23/2018    Heladio Gee is a 65 y.o. female (IVJ:[birth year]) who presents for preoperative evaluation. She is scheduled for a RT TK revision by Dr Lesa Blankenship on 5/2/18    Latex Allergy: no    Problem List:     Patient Active Problem List    Diagnosis Date Noted    Severe obesity (BMI 35.0-39. 9) with comorbidity ([address] 03/22/2018    Knee osteoarthritis 06/13/2016    Osteoarthritis, knee 02/08/2016    Hyperlipidemia 02/01/2016    Impaired glucose tolerance 01/04/2016    Essential hypertension 01/04/2016    Obesity (BMI 30-39.9) 01/04/2016    Mild chronic anemia 05/20/2015    Knee pain 03/12/2013    Arthritis 11/26/2012     Medical History:     Past Medical History:   Diagnosis Date    Arthritis     Elevated cholesterol     Hypertension     Joint pain     knees     Allergies:   No Known Allergies   Medications:     Current Outpatient Prescriptions   Medication Sig    citalopram (CELEXA) 20 mg tablet TAKE 1 TAB BY MOUTH DAILY.  amLODIPine (NORVASC) 10 mg tablet TAKE 1 TABLET BY MOUTH DAILY    atorvastatin (LIPITOR) 20 mg tablet TAKE 1 TABLET BY MOUTH DAILY    losartan-hydroCHLOROthiazide (HYZAAR) 100-25 mg per tablet TAKE 1 TABLET BY MOUTH DAILY.  allopurinol (ZYLOPRIM) 100 mg tablet Take 1 Tab by mouth two (2) times a day.  ferrous sulfate 325 mg (65 mg iron) tablet TAKE 1 TABLET BY MOUTH TWICE A DAY WITH MEALS    fexofenadine (ALLEGRA) 180 mg tablet Take 1 Tab by mouth daily.  tiZANidine (ZANAFLEX) 4 mg tablet TAKE 1 TAB BY MOUTH THREE (3) TIMES DAILY AS NEEDED.  Comp. Stocking,Knee,Regular,Lrg misc Use daily and take off at night, 20mmHg    polyethylene glycol (MIRALAX) 17 gram/dose powder Take as directed by office (Patient taking differently: as needed. Take as directed by office)    MULTIVIT-MIN/FA/CALCIUM/VIT K1 (ONE-A-DAY WOMEN'S 50+ PO) Take  by mouth daily.  morinda citrifolia fruit 250 mg cap Take 1 Cap by mouth daily.     Magnesium Oxide 500 mg cap Take  by mouth daily. No current facility-administered medications for this visit. Surgical History:     Past Surgical History:   Procedure Laterality Date    HX HYSTERECTOMY      HX KNEE REPLACEMENT      HX ORTHOPAEDIC Right Feb, 2016    TKR    HX TONSILLECTOMY       Social History:     Social History     Social History    Marital status: SINGLE     Spouse name: N/A    Number of children: N/A    Years of education: N/A     Social History Main Topics    Smoking status: Never Smoker    Smokeless tobacco: Never Used    Alcohol use No    Drug use: No    Sexual activity: Yes     Partners: Male     Birth control/ protection: None     Other Topics Concern     Service No    Blood Transfusions No    Caffeine Concern Yes    Occupational Exposure No    Hobby Hazards No    Sleep Concern No    Stress Concern No    Weight Concern No    Special Diet No    Back Care No    Exercise No    Bike Helmet No    Seat Belt Yes    Self-Exams Yes     Social History Narrative       Anesthesia Complications: None  History of abnormal bleeding : None  History of Blood Transfusions: no  Health Care Directive or Living Will: no    Objective:     ROS:   Feeling well. No dyspnea or chest pain on exertion. No abdominal pain, change in bowel habits, black or bloody stools. No urinary tract symptoms. GYN ROS: no breast pain or new or enlarging lumps on self exam. No neurological complaints. OBJECTIVE:   The patient appears well, alert, oriented x 3, in no distress. Visit Vitals    /90    Pulse 78    Temp 98.4 °F (36.9 °C)    Resp 12    Ht 5' 9\" (1.753 m)    Wt 257 lb (116.6 kg)    SpO2 98%    BMI 37.95 kg/m2     HEENT:ENT normal.  Neck supple. No adenopathy or thyromegaly. DEBBIE. Chest: Lungs are clear, good air entry, no wheezes, rhonchi or rales. Cardiovascular: S1 and S2 normal, no murmurs, regular rate and rhythm.    Abdomen: soft without tenderness, guarding, mass or organomegaly. Extremities: show no edema, normal peripheral pulses. Neurological: is normal, no focal findings. BREAST EXAM: not examined    PELVIC EXAM: examination not indicated      DIAGNOSTICS:   1. EKG: EKG FINDINGS - unchanged from previous tracings, Normal sinus rhythm   Normal ECG   2. CXR: Mild or borderline cardiomegaly. No evidence of cardiac decompensation. No definable acute cardiopulmonary process. Lungs are clear bilaterally.   3. Labs: PT: 9.8  4/18/18; INR: 0.90; A1c: 6.2    IMPRESSION:   Impaired glucose tolerance  Obesity  Low risk for planned surgery  No contraindications to planned surgery    Karlos Otto NP   4/23/2018

## 2018-04-24 ENCOUNTER — OFFICE VISIT (OUTPATIENT)
Dept: ORTHOPEDIC SURGERY | Facility: CLINIC | Age: 63
End: 2018-04-24

## 2018-04-24 DIAGNOSIS — T84.012A FAILED TOTAL RIGHT KNEE REPLACEMENT, INITIAL ENCOUNTER (HCC): Primary | ICD-10-CM

## 2018-04-25 ENCOUNTER — OFFICE VISIT (OUTPATIENT)
Dept: ORTHOPEDIC SURGERY | Facility: CLINIC | Age: 63
End: 2018-04-25

## 2018-04-25 VITALS
TEMPERATURE: 98.4 F | OXYGEN SATURATION: 98 % | WEIGHT: 254.4 LBS | SYSTOLIC BLOOD PRESSURE: 160 MMHG | RESPIRATION RATE: 18 BRPM | HEIGHT: 69 IN | HEART RATE: 90 BPM | BODY MASS INDEX: 37.68 KG/M2 | DIASTOLIC BLOOD PRESSURE: 90 MMHG

## 2018-04-25 DIAGNOSIS — T84.012A FAILED TOTAL RIGHT KNEE REPLACEMENT, INITIAL ENCOUNTER (HCC): Primary | ICD-10-CM

## 2018-04-25 RX ORDER — WARFARIN 1 MG/1
10 TABLET ORAL ONCE
Status: CANCELLED | OUTPATIENT
Start: 2018-04-25 | End: 2018-04-25

## 2018-04-25 RX ORDER — CELECOXIB 100 MG/1
400 CAPSULE ORAL ONCE
Status: CANCELLED | OUTPATIENT
Start: 2018-04-25 | End: 2018-04-25

## 2018-04-25 RX ORDER — PREGABALIN 25 MG/1
75 CAPSULE ORAL ONCE
Status: CANCELLED | OUTPATIENT
Start: 2018-04-25 | End: 2018-04-25

## 2018-04-25 RX ORDER — ACETAMINOPHEN 325 MG/1
1000 TABLET ORAL ONCE
Status: CANCELLED | OUTPATIENT
Start: 2018-04-25 | End: 2018-04-25

## 2018-04-25 NOTE — H&P
[address] [place] [address]  [phone]           HISTORY & PHYSICAL      Patient: Mandy Ling                MRN: [id number]       SSN: [id number]  YOB: 1955        AGE: 65 y.o. SEX: female  Body mass index is 37.57 kg/(m^2). PCP: Karthik Mo NP  04/25/18      CC: failed right TKA  Problem List Items Addressed This Visit     None            HPI:  The patient is a pleasant 65 y.o. whom had a previous TKA of their Right knee and has failed conservative treatment. She has been worked up for infection and fortunately negative. Due to the current findings and affected activities of daily living, surgical intervention is indicated. The alternatives, risks, complications, as well as expected outcome were discussed. These include but are not limited to infection, blood loss, need for blood transfusion, neurovascular damage, DVT, PE,  post-op stiffness and pain, leg length discrepancy, dislocation, anesthetic complications, prothesis longevity, need for more surgery, MI, stroke, and even death. The patient understands and wishes to proceed with surgery. Past Medical History:   Diagnosis Date    Arthritis     Elevated cholesterol     Hypertension     Joint pain     knees         Current Outpatient Prescriptions:     citalopram (CELEXA) 20 mg tablet, TAKE 1 TAB BY MOUTH DAILY. , Disp: , Rfl: 3    amLODIPine (NORVASC) 10 mg tablet, TAKE 1 TABLET BY MOUTH DAILY, Disp: 90 Tab, Rfl: 3    atorvastatin (LIPITOR) 20 mg tablet, TAKE 1 TABLET BY MOUTH DAILY, Disp: 90 Tab, Rfl: 3    losartan-hydroCHLOROthiazide (HYZAAR) 100-25 mg per tablet, TAKE 1 TABLET BY MOUTH DAILY. , Disp: 90 Tab, Rfl: 3    allopurinol (ZYLOPRIM) 100 mg tablet, Take 1 Tab by mouth two (2) times a day., Disp: 60 Tab, Rfl: 0    ferrous sulfate 325 mg (65 mg iron) tablet, TAKE 1 TABLET BY MOUTH TWICE A DAY WITH MEALS, Disp: 60 Tab, Rfl: 3   fexofenadine (ALLEGRA) 180 mg tablet, Take 1 Tab by mouth daily. , Disp: 30 Tab, Rfl: 3    tiZANidine (ZANAFLEX) 4 mg tablet, TAKE 1 TAB BY MOUTH THREE (3) TIMES DAILY AS NEEDED., Disp: 90 Tab, Rfl: 0    Comp. Stocking,Knee,Regular,Lrg misc, Use daily and take off at night, 20mmHg, Disp: 1 Packet, Rfl: 0    polyethylene glycol (MIRALAX) 17 gram/dose powder, Take as directed by office (Patient taking differently: as needed. Take as directed by office), Disp: 255 g, Rfl: 0    MULTIVIT-MIN/FA/CALCIUM/VIT K1 (ONE-A-DAY WOMEN'S 50+ PO), Take  by mouth daily. , Disp: , Rfl:     morinda citrifolia fruit 250 mg cap, Take 1 Cap by mouth daily. , Disp: , Rfl:     Magnesium Oxide 500 mg cap, Take  by mouth daily. , Disp: , Rfl:     No Known Allergies    Social History     Social History    Marital status: SINGLE     Spouse name: N/A    Number of children: N/A    Years of education: N/A     Occupational History    Not on file. Social History Main Topics    Smoking status: Never Smoker    Smokeless tobacco: Never Used    Alcohol use No    Drug use: No    Sexual activity: Yes     Partners: Male     Birth control/ protection: None     Other Topics Concern     Service No    Blood Transfusions No    Caffeine Concern Yes    Occupational Exposure No    Hobby Hazards No    Sleep Concern No    Stress Concern No    Weight Concern No    Special Diet No    Back Care No    Exercise No    Bike Helmet No    Seat Belt Yes    Self-Exams Yes     Social History Narrative       Past Surgical History:   Procedure Laterality Date    HX HYSTERECTOMY      HX KNEE REPLACEMENT      HX ORTHOPAEDIC Right Feb, 2016    TKR    HX TONSILLECTOMY         Family History:  Non-contributory.      PE:  Visit Vitals    /90    Pulse 90    Temp 98.4 °F (36.9 °C) (Oral)    Resp 18    Ht 5' 9\" (1.753 m)    Wt 254 lb 6.4 oz (115.4 kg)    SpO2 98%    BMI 37.57 kg/m2       A&O X3, NAD, well develop, well nourished  Heart: S1-S2, rrr  Lungs: CTA bilat  Abd: soft, nt, nt, + bs in all quadrants  Ext:  Pos distal pulses to DP, PT  Right knee -5- 100 degrees, instability noted in extension and mid-flexion    X-ray: right knee shows TKA with evidence of prosthetic loosening    Labs: labs were reviewed and wnl.  ua neg    A:  Right  Knee, failed TKA     P:  At this point we will move forward with surgery. Again, the alternatives, risks, complications, as well as expected outcome were discussed and the patient wishes to proceed with surgery. Pt has been instructed to stop aspirin, nsaids, rheumatologic medications and blood thinners. They have also been instructed to continue on any heart and bp meds and to take them the morning of surgery with sips of water. The patient will require in patient admission due to above stated medical conditions as well the the surgical challenges given the anatomy and bone quality.          Nav Caraballo

## 2018-04-25 NOTE — PATIENT INSTRUCTIONS
Patient: Edelmira Pino                MRN: [id number]       SSN: [id number]  YOB: 1955        AGE: 65 y.o. SEX: female  There is no height or weight on file to calculate BMI.    04/25/18    DO:  1:  Sit with the leg out straight 5-10 min every hour while awake. Keep the toes pointed       up towards the skylar. 2.  Bend your knee 5-10 min every hour. Bend it to the point of pain and hold it for 5-10       Min. 3.  ICE your knee 20 min every hour    DO NOT:    1. Do not place anything under your knee to prop it up  2. Do not sit in the recliner chair.

## 2018-05-01 ENCOUNTER — ANESTHESIA EVENT (OUTPATIENT)
Dept: SURGERY | Age: 63
DRG: 468 | End: 2018-05-01
Payer: COMMERCIAL

## 2018-05-02 ENCOUNTER — HOSPITAL ENCOUNTER (INPATIENT)
Age: 63
LOS: 1 days | Discharge: HOME HEALTH | DRG: 468 | End: 2018-05-03
Attending: ORTHOPAEDIC SURGERY | Admitting: ORTHOPAEDIC SURGERY
Payer: COMMERCIAL

## 2018-05-02 ENCOUNTER — APPOINTMENT (OUTPATIENT)
Dept: GENERAL RADIOLOGY | Age: 63
DRG: 468 | End: 2018-05-02
Attending: ORTHOPAEDIC SURGERY
Payer: COMMERCIAL

## 2018-05-02 ENCOUNTER — ANESTHESIA (OUTPATIENT)
Dept: SURGERY | Age: 63
DRG: 468 | End: 2018-05-02
Payer: COMMERCIAL

## 2018-05-02 DIAGNOSIS — T84.018D FAILURE OF TOTAL KNEE REPLACEMENT, SUBSEQUENT ENCOUNTER: Primary | ICD-10-CM

## 2018-05-02 DIAGNOSIS — Z96.659 FAILURE OF TOTAL KNEE REPLACEMENT, SUBSEQUENT ENCOUNTER: Primary | ICD-10-CM

## 2018-05-02 PROBLEM — T84.018A FAILED TOTAL KNEE REPLACEMENT (HCC): Status: ACTIVE | Noted: 2018-05-02

## 2018-05-02 PROCEDURE — 77030018836 HC SOL IRR NACL ICUM -A: Performed by: ORTHOPAEDIC SURGERY

## 2018-05-02 PROCEDURE — 76060000035 HC ANESTHESIA 2 TO 2.5 HR: Performed by: ORTHOPAEDIC SURGERY

## 2018-05-02 PROCEDURE — 74011250637 HC RX REV CODE- 250/637: Performed by: PHYSICIAN ASSISTANT

## 2018-05-02 PROCEDURE — 65270000029 HC RM PRIVATE

## 2018-05-02 PROCEDURE — 77030012890: Performed by: ORTHOPAEDIC SURGERY

## 2018-05-02 PROCEDURE — 74011000258 HC RX REV CODE- 258

## 2018-05-02 PROCEDURE — 74011250636 HC RX REV CODE- 250/636: Performed by: ORTHOPAEDIC SURGERY

## 2018-05-02 PROCEDURE — C1713 ANCHOR/SCREW BN/BN,TIS/BN: HCPCS | Performed by: ORTHOPAEDIC SURGERY

## 2018-05-02 PROCEDURE — 97161 PT EVAL LOW COMPLEX 20 MIN: CPT

## 2018-05-02 PROCEDURE — 73560 X-RAY EXAM OF KNEE 1 OR 2: CPT

## 2018-05-02 PROCEDURE — 74011250636 HC RX REV CODE- 250/636

## 2018-05-02 PROCEDURE — 74011000258 HC RX REV CODE- 258: Performed by: PHYSICIAN ASSISTANT

## 2018-05-02 PROCEDURE — L1810 KO ELASTIC WITH JOINTS: HCPCS | Performed by: ORTHOPAEDIC SURGERY

## 2018-05-02 PROCEDURE — 74011000258 HC RX REV CODE- 258: Performed by: ORTHOPAEDIC SURGERY

## 2018-05-02 PROCEDURE — C1776 JOINT DEVICE (IMPLANTABLE): HCPCS | Performed by: ORTHOPAEDIC SURGERY

## 2018-05-02 PROCEDURE — 77030031139 HC SUT VCRL2 J&J -A: Performed by: ORTHOPAEDIC SURGERY

## 2018-05-02 PROCEDURE — 77030008467 HC STPLR SKN COVD -B: Performed by: ORTHOPAEDIC SURGERY

## 2018-05-02 PROCEDURE — 76010000131 HC OR TIME 2 TO 2.5 HR: Performed by: ORTHOPAEDIC SURGERY

## 2018-05-02 PROCEDURE — 74011000250 HC RX REV CODE- 250

## 2018-05-02 PROCEDURE — 77030018883 HC BLD SAW SAG4 STRY -B: Performed by: ORTHOPAEDIC SURGERY

## 2018-05-02 PROCEDURE — 77030034671 HC BUR RND UPWR CNMD -B: Performed by: ORTHOPAEDIC SURGERY

## 2018-05-02 PROCEDURE — 77030003666 HC NDL SPINAL BD -A: Performed by: ORTHOPAEDIC SURGERY

## 2018-05-02 PROCEDURE — 77030016547 HC BLD SAW SAG1 STRY -B: Performed by: ORTHOPAEDIC SURGERY

## 2018-05-02 PROCEDURE — 74011250637 HC RX REV CODE- 250/637: Performed by: ORTHOPAEDIC SURGERY

## 2018-05-02 PROCEDURE — 74011250636 HC RX REV CODE- 250/636: Performed by: PHYSICIAN ASSISTANT

## 2018-05-02 PROCEDURE — 74011000250 HC RX REV CODE- 250: Performed by: NURSE ANESTHETIST, CERTIFIED REGISTERED

## 2018-05-02 PROCEDURE — 77030012411 HC DRN WND CARD -A: Performed by: ORTHOPAEDIC SURGERY

## 2018-05-02 PROCEDURE — 77030013708 HC HNDPC SUC IRR PULS STRY –B: Performed by: ORTHOPAEDIC SURGERY

## 2018-05-02 PROCEDURE — 76942 ECHO GUIDE FOR BIOPSY: CPT | Performed by: ANESTHESIOLOGY

## 2018-05-02 PROCEDURE — 77030029494 HC CLD THER UNIT ICMN DJOR -B: Performed by: ORTHOPAEDIC SURGERY

## 2018-05-02 PROCEDURE — 74011000250 HC RX REV CODE- 250: Performed by: ORTHOPAEDIC SURGERY

## 2018-05-02 PROCEDURE — 3E0T3BZ INTRODUCTION OF ANESTHETIC AGENT INTO PERIPHERAL NERVES AND PLEXI, PERCUTANEOUS APPROACH: ICD-10-PCS | Performed by: ANESTHESIOLOGY

## 2018-05-02 PROCEDURE — 0SRT0J9 REPLACEMENT OF RIGHT KNEE JOINT, FEMORAL SURFACE WITH SYNTHETIC SUBSTITUTE, CEMENTED, OPEN APPROACH: ICD-10-PCS | Performed by: ORTHOPAEDIC SURGERY

## 2018-05-02 PROCEDURE — 77030012935 HC DRSG AQUACEL BMS -B: Performed by: ORTHOPAEDIC SURGERY

## 2018-05-02 PROCEDURE — 77030008683 HC TU ET CUF COVD -A: Performed by: ANESTHESIOLOGY

## 2018-05-02 PROCEDURE — 77030019557 HC ELECTRD VES SEAL MEDT -F: Performed by: ORTHOPAEDIC SURGERY

## 2018-05-02 PROCEDURE — 77030016682: Performed by: ORTHOPAEDIC SURGERY

## 2018-05-02 PROCEDURE — 64447 NJX AA&/STRD FEMORAL NRV IMG: CPT | Performed by: ANESTHESIOLOGY

## 2018-05-02 PROCEDURE — C9290 INJ, BUPIVACAINE LIPOSOME: HCPCS | Performed by: PHYSICIAN ASSISTANT

## 2018-05-02 PROCEDURE — 77030003029 HC SUT VCRL J&J -B: Performed by: ORTHOPAEDIC SURGERY

## 2018-05-02 PROCEDURE — 0SPT0JZ REMOVAL OF SYNTHETIC SUBSTITUTE FROM RIGHT KNEE JOINT, FEMORAL SURFACE, OPEN APPROACH: ICD-10-PCS | Performed by: ORTHOPAEDIC SURGERY

## 2018-05-02 PROCEDURE — 77030002933 HC SUT MCRYL J&J -A: Performed by: ORTHOPAEDIC SURGERY

## 2018-05-02 PROCEDURE — 77030018719 HC DRSG PTCH ANTIMIC J&J -A: Performed by: ORTHOPAEDIC SURGERY

## 2018-05-02 PROCEDURE — 77030016544 HC BLD SAW RECIP1 STRY -B: Performed by: ORTHOPAEDIC SURGERY

## 2018-05-02 PROCEDURE — 74011250637 HC RX REV CODE- 250/637: Performed by: NURSE ANESTHETIST, CERTIFIED REGISTERED

## 2018-05-02 PROCEDURE — 77030019605: Performed by: ORTHOPAEDIC SURGERY

## 2018-05-02 PROCEDURE — 74011250636 HC RX REV CODE- 250/636: Performed by: NURSE ANESTHETIST, CERTIFIED REGISTERED

## 2018-05-02 PROCEDURE — 97116 GAIT TRAINING THERAPY: CPT

## 2018-05-02 PROCEDURE — 77030011640 HC PAD GRND REM COVD -A: Performed by: ORTHOPAEDIC SURGERY

## 2018-05-02 PROCEDURE — 74011000250 HC RX REV CODE- 250: Performed by: PHYSICIAN ASSISTANT

## 2018-05-02 PROCEDURE — 77030020782 HC GWN BAIR PAWS FLX 3M -B: Performed by: ORTHOPAEDIC SURGERY

## 2018-05-02 PROCEDURE — 76210000006 HC OR PH I REC 0.5 TO 1 HR: Performed by: ORTHOPAEDIC SURGERY

## 2018-05-02 DEVICE — CEMENT BNE 20ML 41GM FULL DOSE PMMA W/ TOBRA M VISC RADPQ: Type: IMPLANTABLE DEVICE | Site: KNEE | Status: FUNCTIONAL

## 2018-05-02 DEVICE — INSERT TIB SZ 2.5 THK12.5MM GVF ROT PLATFRM TC3 SIG: Type: IMPLANTABLE DEVICE | Site: KNEE | Status: FUNCTIONAL

## 2018-05-02 RX ORDER — EPHEDRINE SULFATE 50 MG/ML
INJECTION, SOLUTION INTRAVENOUS AS NEEDED
Status: DISCONTINUED | OUTPATIENT
Start: 2018-05-02 | End: 2018-05-02 | Stop reason: HOSPADM

## 2018-05-02 RX ORDER — CITALOPRAM 20 MG/1
20 TABLET, FILM COATED ORAL DAILY
Status: DISCONTINUED | OUTPATIENT
Start: 2018-05-03 | End: 2018-05-03 | Stop reason: HOSPADM

## 2018-05-02 RX ORDER — POLYMYXIN B 500000 [USP'U]/1
INJECTION, POWDER, LYOPHILIZED, FOR SOLUTION INTRAMUSCULAR; INTRATHECAL; INTRAVENOUS; OPHTHALMIC AS NEEDED
Status: DISCONTINUED | OUTPATIENT
Start: 2018-05-02 | End: 2018-05-02 | Stop reason: HOSPADM

## 2018-05-02 RX ORDER — LIDOCAINE HYDROCHLORIDE 20 MG/ML
INJECTION, SOLUTION EPIDURAL; INFILTRATION; INTRACAUDAL; PERINEURAL AS NEEDED
Status: DISCONTINUED | OUTPATIENT
Start: 2018-05-02 | End: 2018-05-02 | Stop reason: HOSPADM

## 2018-05-02 RX ORDER — KETOROLAC TROMETHAMINE 30 MG/ML
INJECTION, SOLUTION INTRAMUSCULAR; INTRAVENOUS AS NEEDED
Status: DISCONTINUED | OUTPATIENT
Start: 2018-05-02 | End: 2018-05-02 | Stop reason: HOSPADM

## 2018-05-02 RX ORDER — LOSARTAN POTASSIUM AND HYDROCHLOROTHIAZIDE 25; 100 MG/1; MG/1
1 TABLET ORAL DAILY
Status: DISCONTINUED | OUTPATIENT
Start: 2018-05-03 | End: 2018-05-03 | Stop reason: HOSPADM

## 2018-05-02 RX ORDER — PROPOFOL 10 MG/ML
INJECTION, EMULSION INTRAVENOUS AS NEEDED
Status: DISCONTINUED | OUTPATIENT
Start: 2018-05-02 | End: 2018-05-02 | Stop reason: HOSPADM

## 2018-05-02 RX ORDER — AMLODIPINE BESYLATE 10 MG/1
10 TABLET ORAL DAILY
Status: DISCONTINUED | OUTPATIENT
Start: 2018-05-03 | End: 2018-05-03 | Stop reason: HOSPADM

## 2018-05-02 RX ORDER — ONDANSETRON 2 MG/ML
INJECTION INTRAMUSCULAR; INTRAVENOUS AS NEEDED
Status: DISCONTINUED | OUTPATIENT
Start: 2018-05-02 | End: 2018-05-02 | Stop reason: HOSPADM

## 2018-05-02 RX ORDER — ONDANSETRON 2 MG/ML
4 INJECTION INTRAMUSCULAR; INTRAVENOUS
Status: DISCONTINUED | OUTPATIENT
Start: 2018-05-02 | End: 2018-05-03 | Stop reason: HOSPADM

## 2018-05-02 RX ORDER — CELECOXIB 400 MG/1
400 CAPSULE ORAL ONCE
Status: COMPLETED | OUTPATIENT
Start: 2018-05-02 | End: 2018-05-02

## 2018-05-02 RX ORDER — FAMOTIDINE 20 MG/1
20 TABLET, FILM COATED ORAL ONCE
Status: COMPLETED | OUTPATIENT
Start: 2018-05-02 | End: 2018-05-02

## 2018-05-02 RX ORDER — INSULIN LISPRO 100 [IU]/ML
INJECTION, SOLUTION INTRAVENOUS; SUBCUTANEOUS ONCE
Status: DISCONTINUED | OUTPATIENT
Start: 2018-05-02 | End: 2018-05-02 | Stop reason: HOSPADM

## 2018-05-02 RX ORDER — PREGABALIN 50 MG/1
50 CAPSULE ORAL 2 TIMES DAILY
Status: DISCONTINUED | OUTPATIENT
Start: 2018-05-02 | End: 2018-05-03 | Stop reason: HOSPADM

## 2018-05-02 RX ORDER — SODIUM CHLORIDE 0.9 % (FLUSH) 0.9 %
5-10 SYRINGE (ML) INJECTION AS NEEDED
Status: DISCONTINUED | OUTPATIENT
Start: 2018-05-02 | End: 2018-05-03 | Stop reason: HOSPADM

## 2018-05-02 RX ORDER — ASPIRIN 325 MG/1
325 TABLET, FILM COATED ORAL 2 TIMES DAILY
Status: DISCONTINUED | OUTPATIENT
Start: 2018-05-03 | End: 2018-05-03 | Stop reason: HOSPADM

## 2018-05-02 RX ORDER — WARFARIN 10 MG/1
10 TABLET ORAL ONCE
Status: COMPLETED | OUTPATIENT
Start: 2018-05-02 | End: 2018-05-02

## 2018-05-02 RX ORDER — SODIUM CHLORIDE 0.9 % (FLUSH) 0.9 %
5-10 SYRINGE (ML) INJECTION EVERY 8 HOURS
Status: DISCONTINUED | OUTPATIENT
Start: 2018-05-02 | End: 2018-05-03 | Stop reason: HOSPADM

## 2018-05-02 RX ORDER — NALOXONE HYDROCHLORIDE 0.4 MG/ML
0.4 INJECTION, SOLUTION INTRAMUSCULAR; INTRAVENOUS; SUBCUTANEOUS AS NEEDED
Status: DISCONTINUED | OUTPATIENT
Start: 2018-05-02 | End: 2018-05-03 | Stop reason: HOSPADM

## 2018-05-02 RX ORDER — BUPIVACAINE HYDROCHLORIDE 5 MG/ML
INJECTION, SOLUTION EPIDURAL; INTRACAUDAL AS NEEDED
Status: DISCONTINUED | OUTPATIENT
Start: 2018-05-02 | End: 2018-05-02 | Stop reason: HOSPADM

## 2018-05-02 RX ORDER — CEFAZOLIN SODIUM 2 G/50ML
2 SOLUTION INTRAVENOUS EVERY 8 HOURS
Status: COMPLETED | OUTPATIENT
Start: 2018-05-02 | End: 2018-05-03

## 2018-05-02 RX ORDER — SODIUM CHLORIDE 0.9 % (FLUSH) 0.9 %
5-10 SYRINGE (ML) INJECTION AS NEEDED
Status: DISCONTINUED | OUTPATIENT
Start: 2018-05-02 | End: 2018-05-02 | Stop reason: HOSPADM

## 2018-05-02 RX ORDER — MIDAZOLAM HYDROCHLORIDE 1 MG/ML
2 INJECTION, SOLUTION INTRAMUSCULAR; INTRAVENOUS ONCE
Status: COMPLETED | OUTPATIENT
Start: 2018-05-02 | End: 2018-05-02

## 2018-05-02 RX ORDER — FENTANYL CITRATE 50 UG/ML
INJECTION, SOLUTION INTRAMUSCULAR; INTRAVENOUS AS NEEDED
Status: DISCONTINUED | OUTPATIENT
Start: 2018-05-02 | End: 2018-05-02 | Stop reason: HOSPADM

## 2018-05-02 RX ORDER — VANCOMYCIN HYDROCHLORIDE 1 G/20ML
INJECTION, POWDER, LYOPHILIZED, FOR SOLUTION INTRAVENOUS AS NEEDED
Status: DISCONTINUED | OUTPATIENT
Start: 2018-05-02 | End: 2018-05-02 | Stop reason: HOSPADM

## 2018-05-02 RX ORDER — SODIUM CHLORIDE 9 MG/ML
100 INJECTION, SOLUTION INTRAVENOUS CONTINUOUS
Status: DISPENSED | OUTPATIENT
Start: 2018-05-02 | End: 2018-05-03

## 2018-05-02 RX ORDER — MORPHINE SULFATE 2 MG/ML
INJECTION, SOLUTION INTRAMUSCULAR; INTRAVENOUS AS NEEDED
Status: DISCONTINUED | OUTPATIENT
Start: 2018-05-02 | End: 2018-05-02 | Stop reason: HOSPADM

## 2018-05-02 RX ORDER — CELECOXIB 100 MG/1
200 CAPSULE ORAL 2 TIMES DAILY
Status: DISCONTINUED | OUTPATIENT
Start: 2018-05-02 | End: 2018-05-03 | Stop reason: HOSPADM

## 2018-05-02 RX ORDER — DIPHENHYDRAMINE HYDROCHLORIDE 50 MG/ML
12.5 INJECTION, SOLUTION INTRAMUSCULAR; INTRAVENOUS
Status: DISCONTINUED | OUTPATIENT
Start: 2018-05-02 | End: 2018-05-03 | Stop reason: HOSPADM

## 2018-05-02 RX ORDER — SUCCINYLCHOLINE CHLORIDE 20 MG/ML
INJECTION INTRAMUSCULAR; INTRAVENOUS AS NEEDED
Status: DISCONTINUED | OUTPATIENT
Start: 2018-05-02 | End: 2018-05-02 | Stop reason: HOSPADM

## 2018-05-02 RX ORDER — ZOLPIDEM TARTRATE 5 MG/1
5 TABLET ORAL
Status: DISCONTINUED | OUTPATIENT
Start: 2018-05-02 | End: 2018-05-03 | Stop reason: HOSPADM

## 2018-05-02 RX ORDER — LIDOCAINE HYDROCHLORIDE 10 MG/ML
0.1 INJECTION, SOLUTION EPIDURAL; INFILTRATION; INTRACAUDAL; PERINEURAL AS NEEDED
Status: DISCONTINUED | OUTPATIENT
Start: 2018-05-02 | End: 2018-05-02 | Stop reason: HOSPADM

## 2018-05-02 RX ORDER — SODIUM CHLORIDE, SODIUM LACTATE, POTASSIUM CHLORIDE, CALCIUM CHLORIDE 600; 310; 30; 20 MG/100ML; MG/100ML; MG/100ML; MG/100ML
75 INJECTION, SOLUTION INTRAVENOUS CONTINUOUS
Status: DISCONTINUED | OUTPATIENT
Start: 2018-05-02 | End: 2018-05-02 | Stop reason: HOSPADM

## 2018-05-02 RX ORDER — SODIUM CHLORIDE 0.9 % (FLUSH) 0.9 %
5-10 SYRINGE (ML) INJECTION EVERY 8 HOURS
Status: DISCONTINUED | OUTPATIENT
Start: 2018-05-02 | End: 2018-05-02 | Stop reason: HOSPADM

## 2018-05-02 RX ORDER — OXYCODONE HYDROCHLORIDE 10 MG/1
10-20 TABLET ORAL
Status: DISCONTINUED | OUTPATIENT
Start: 2018-05-02 | End: 2018-05-03 | Stop reason: HOSPADM

## 2018-05-02 RX ORDER — LANOLIN ALCOHOL/MO/W.PET/CERES
1 CREAM (GRAM) TOPICAL 2 TIMES DAILY WITH MEALS
Status: DISCONTINUED | OUTPATIENT
Start: 2018-05-02 | End: 2018-05-03 | Stop reason: HOSPADM

## 2018-05-02 RX ORDER — ROCURONIUM BROMIDE 10 MG/ML
INJECTION, SOLUTION INTRAVENOUS AS NEEDED
Status: DISCONTINUED | OUTPATIENT
Start: 2018-05-02 | End: 2018-05-02 | Stop reason: HOSPADM

## 2018-05-02 RX ORDER — FENTANYL CITRATE 50 UG/ML
100 INJECTION, SOLUTION INTRAMUSCULAR; INTRAVENOUS ONCE
Status: COMPLETED | OUTPATIENT
Start: 2018-05-02 | End: 2018-05-02

## 2018-05-02 RX ORDER — EPINEPHRINE 1 MG/ML
INJECTION, SOLUTION, CONCENTRATE INTRAVENOUS AS NEEDED
Status: DISCONTINUED | OUTPATIENT
Start: 2018-05-02 | End: 2018-05-02 | Stop reason: HOSPADM

## 2018-05-02 RX ORDER — DEXAMETHASONE SODIUM PHOSPHATE 4 MG/ML
INJECTION, SOLUTION INTRA-ARTICULAR; INTRALESIONAL; INTRAMUSCULAR; INTRAVENOUS; SOFT TISSUE AS NEEDED
Status: DISCONTINUED | OUTPATIENT
Start: 2018-05-02 | End: 2018-05-02 | Stop reason: HOSPADM

## 2018-05-02 RX ORDER — GLYCOPYRROLATE 0.2 MG/ML
INJECTION INTRAMUSCULAR; INTRAVENOUS AS NEEDED
Status: DISCONTINUED | OUTPATIENT
Start: 2018-05-02 | End: 2018-05-02 | Stop reason: HOSPADM

## 2018-05-02 RX ORDER — DOCUSATE SODIUM 100 MG/1
100 CAPSULE, LIQUID FILLED ORAL 2 TIMES DAILY
Status: DISCONTINUED | OUTPATIENT
Start: 2018-05-02 | End: 2018-05-03 | Stop reason: HOSPADM

## 2018-05-02 RX ORDER — ACETAMINOPHEN 500 MG
1000 TABLET ORAL 4 TIMES DAILY
Status: DISCONTINUED | OUTPATIENT
Start: 2018-05-02 | End: 2018-05-03 | Stop reason: HOSPADM

## 2018-05-02 RX ORDER — ROPIVACAINE HYDROCHLORIDE 2 MG/ML
30 INJECTION, SOLUTION EPIDURAL; INFILTRATION; PERINEURAL
Status: COMPLETED | OUTPATIENT
Start: 2018-05-02 | End: 2018-05-02

## 2018-05-02 RX ORDER — ACETAMINOPHEN 500 MG
1000 TABLET ORAL ONCE
Status: COMPLETED | OUTPATIENT
Start: 2018-05-02 | End: 2018-05-02

## 2018-05-02 RX ORDER — CEFAZOLIN SODIUM 2 G/50ML
2 SOLUTION INTRAVENOUS
Status: COMPLETED | OUTPATIENT
Start: 2018-05-02 | End: 2018-05-02

## 2018-05-02 RX ORDER — NEOSTIGMINE METHYLSULFATE 1 MG/ML
INJECTION INTRAVENOUS AS NEEDED
Status: DISCONTINUED | OUTPATIENT
Start: 2018-05-02 | End: 2018-05-02 | Stop reason: HOSPADM

## 2018-05-02 RX ORDER — PREGABALIN 75 MG/1
75 CAPSULE ORAL ONCE
Status: COMPLETED | OUTPATIENT
Start: 2018-05-02 | End: 2018-05-02

## 2018-05-02 RX ORDER — TALC
500 POWDER (GRAM) TOPICAL DAILY
Status: DISCONTINUED | OUTPATIENT
Start: 2018-05-03 | End: 2018-05-03 | Stop reason: HOSPADM

## 2018-05-02 RX ADMIN — PREGABALIN 50 MG: 50 CAPSULE ORAL at 16:02

## 2018-05-02 RX ADMIN — MIDAZOLAM HYDROCHLORIDE 2 MG: 1 INJECTION, SOLUTION INTRAMUSCULAR; INTRAVENOUS at 10:20

## 2018-05-02 RX ADMIN — EPHEDRINE SULFATE 5 MG: 50 INJECTION, SOLUTION INTRAVENOUS at 11:51

## 2018-05-02 RX ADMIN — Medication 10 ML: at 21:32

## 2018-05-02 RX ADMIN — LIDOCAINE HYDROCHLORIDE 80 MG: 20 INJECTION, SOLUTION EPIDURAL; INFILTRATION; INTRACAUDAL; PERINEURAL at 11:25

## 2018-05-02 RX ADMIN — FENTANYL CITRATE 100 MCG: 50 INJECTION, SOLUTION INTRAMUSCULAR; INTRAVENOUS at 11:24

## 2018-05-02 RX ADMIN — MORPHINE SULFATE 2 MG: 2 INJECTION, SOLUTION INTRAMUSCULAR; INTRAVENOUS at 13:03

## 2018-05-02 RX ADMIN — Medication 10 ML: at 16:03

## 2018-05-02 RX ADMIN — SODIUM CHLORIDE 100 ML/HR: 900 INJECTION, SOLUTION INTRAVENOUS at 16:59

## 2018-05-02 RX ADMIN — LIDOCAINE HYDROCHLORIDE 0.1 ML: 10 INJECTION, SOLUTION EPIDURAL; INFILTRATION; INTRACAUDAL; PERINEURAL at 10:24

## 2018-05-02 RX ADMIN — CELECOXIB 200 MG: 100 CAPSULE ORAL at 18:35

## 2018-05-02 RX ADMIN — ONDANSETRON 4 MG: 2 INJECTION INTRAMUSCULAR; INTRAVENOUS at 16:50

## 2018-05-02 RX ADMIN — ROCURONIUM BROMIDE 30 MG: 10 INJECTION, SOLUTION INTRAVENOUS at 11:35

## 2018-05-02 RX ADMIN — FENTANYL CITRATE 100 MCG: 50 INJECTION INTRAMUSCULAR; INTRAVENOUS at 10:20

## 2018-05-02 RX ADMIN — ONDANSETRON 4 MG: 2 INJECTION INTRAMUSCULAR; INTRAVENOUS at 13:35

## 2018-05-02 RX ADMIN — NEOSTIGMINE METHYLSULFATE 3 MG: 1 INJECTION INTRAVENOUS at 13:38

## 2018-05-02 RX ADMIN — ROPIVACAINE HYDROCHLORIDE 60 MG: 2 INJECTION, SOLUTION EPIDURAL; INFILTRATION at 10:35

## 2018-05-02 RX ADMIN — SUCCINYLCHOLINE CHLORIDE 120 MG: 20 INJECTION INTRAMUSCULAR; INTRAVENOUS at 11:25

## 2018-05-02 RX ADMIN — CEFAZOLIN SODIUM 2 G: 2 SOLUTION INTRAVENOUS at 16:50

## 2018-05-02 RX ADMIN — ACETAMINOPHEN 1000 MG: 500 TABLET, FILM COATED ORAL at 09:50

## 2018-05-02 RX ADMIN — MORPHINE SULFATE 2 MG: 2 INJECTION, SOLUTION INTRAMUSCULAR; INTRAVENOUS at 11:41

## 2018-05-02 RX ADMIN — FERROUS SULFATE TAB 325 MG (65 MG ELEMENTAL FE) 325 MG: 325 (65 FE) TAB at 16:50

## 2018-05-02 RX ADMIN — CEFAZOLIN SODIUM 1 G: 2 SOLUTION INTRAVENOUS at 11:38

## 2018-05-02 RX ADMIN — WARFARIN SODIUM 10 MG: 10 TABLET ORAL at 09:50

## 2018-05-02 RX ADMIN — ACETAMINOPHEN 1000 MG: 500 TABLET, FILM COATED ORAL at 21:31

## 2018-05-02 RX ADMIN — CELECOXIB 400 MG: 400 CAPSULE ORAL at 09:50

## 2018-05-02 RX ADMIN — FAMOTIDINE 20 MG: 20 TABLET ORAL at 09:50

## 2018-05-02 RX ADMIN — TRANEXAMIC ACID 1 G: 100 INJECTION, SOLUTION INTRAVENOUS at 11:38

## 2018-05-02 RX ADMIN — PROPOFOL 150 MG: 10 INJECTION, EMULSION INTRAVENOUS at 11:25

## 2018-05-02 RX ADMIN — MORPHINE SULFATE 2 MG: 2 INJECTION, SOLUTION INTRAMUSCULAR; INTRAVENOUS at 13:45

## 2018-05-02 RX ADMIN — SODIUM CHLORIDE, SODIUM LACTATE, POTASSIUM CHLORIDE, AND CALCIUM CHLORIDE 75 ML/HR: 600; 310; 30; 20 INJECTION, SOLUTION INTRAVENOUS at 09:48

## 2018-05-02 RX ADMIN — PREGABALIN 75 MG: 75 CAPSULE ORAL at 09:50

## 2018-05-02 RX ADMIN — GLYCOPYRROLATE 0.4 MG: 0.2 INJECTION INTRAMUSCULAR; INTRAVENOUS at 13:38

## 2018-05-02 RX ADMIN — DEXAMETHASONE SODIUM PHOSPHATE 4 MG: 4 INJECTION, SOLUTION INTRA-ARTICULAR; INTRALESIONAL; INTRAMUSCULAR; INTRAVENOUS; SOFT TISSUE at 11:33

## 2018-05-02 RX ADMIN — LIDOCAINE HYDROCHLORIDE 20 MG: 20 INJECTION, SOLUTION EPIDURAL; INFILTRATION; INTRACAUDAL; PERINEURAL at 11:45

## 2018-05-02 RX ADMIN — TRANEXAMIC ACID 1 G: 100 INJECTION, SOLUTION INTRAVENOUS at 13:32

## 2018-05-02 RX ADMIN — PROPOFOL 50 MG: 10 INJECTION, EMULSION INTRAVENOUS at 11:45

## 2018-05-02 RX ADMIN — MORPHINE SULFATE 2 MG: 2 INJECTION, SOLUTION INTRAMUSCULAR; INTRAVENOUS at 11:30

## 2018-05-02 RX ADMIN — ONDANSETRON 4 MG: 2 INJECTION INTRAMUSCULAR; INTRAVENOUS at 11:24

## 2018-05-02 RX ADMIN — MORPHINE SULFATE 2 MG: 2 INJECTION, SOLUTION INTRAMUSCULAR; INTRAVENOUS at 13:37

## 2018-05-02 RX ADMIN — DOCUSATE SODIUM 100 MG: 100 CAPSULE, LIQUID FILLED ORAL at 18:35

## 2018-05-02 RX ADMIN — CEFAZOLIN SODIUM 2 G: 2 SOLUTION INTRAVENOUS at 11:31

## 2018-05-02 RX ADMIN — ACETAMINOPHEN 1000 MG: 500 TABLET, FILM COATED ORAL at 18:35

## 2018-05-02 NOTE — PROGRESS NOTES
Report given by Juan Agee RN from PACU regarding patients medical situations for continuation of care.

## 2018-05-02 NOTE — INTERVAL H&P NOTE
H&P Update:  Mauricio Baeza was seen and examined. History and physical has been reviewed. The patient has been examined.  There have been no significant clinical changes since the completion of the originally dated History and Physical.    Signed By: Tatiana Shirley MD     May 2, 2018 10:32 AM

## 2018-05-02 NOTE — H&P (VIEW-ONLY)
[address] [place] [address]  [phone]           HISTORY & PHYSICAL      Patient: Laury Aaron                MRN: [id number]       SSN: [id number]  YOB: 1955        AGE: 65 y.o. SEX: female  Body mass index is 37.57 kg/(m^2). PCP: Radha Valderrama NP  04/25/18      CC: failed right TKA  Problem List Items Addressed This Visit     None            HPI:  The patient is a pleasant 65 y.o. whom had a previous TKA of their Right knee and has failed conservative treatment. She has been worked up for infection and fortunately negative. Due to the current findings and affected activities of daily living, surgical intervention is indicated. The alternatives, risks, complications, as well as expected outcome were discussed. These include but are not limited to infection, blood loss, need for blood transfusion, neurovascular damage, DVT, PE,  post-op stiffness and pain, leg length discrepancy, dislocation, anesthetic complications, prothesis longevity, need for more surgery, MI, stroke, and even death. The patient understands and wishes to proceed with surgery. Past Medical History:   Diagnosis Date    Arthritis     Elevated cholesterol     Hypertension     Joint pain     knees         Current Outpatient Prescriptions:     citalopram (CELEXA) 20 mg tablet, TAKE 1 TAB BY MOUTH DAILY. , Disp: , Rfl: 3    amLODIPine (NORVASC) 10 mg tablet, TAKE 1 TABLET BY MOUTH DAILY, Disp: 90 Tab, Rfl: 3    atorvastatin (LIPITOR) 20 mg tablet, TAKE 1 TABLET BY MOUTH DAILY, Disp: 90 Tab, Rfl: 3    losartan-hydroCHLOROthiazide (HYZAAR) 100-25 mg per tablet, TAKE 1 TABLET BY MOUTH DAILY. , Disp: 90 Tab, Rfl: 3    allopurinol (ZYLOPRIM) 100 mg tablet, Take 1 Tab by mouth two (2) times a day., Disp: 60 Tab, Rfl: 0    ferrous sulfate 325 mg (65 mg iron) tablet, TAKE 1 TABLET BY MOUTH TWICE A DAY WITH MEALS, Disp: 60 Tab, Rfl: 3   fexofenadine (ALLEGRA) 180 mg tablet, Take 1 Tab by mouth daily. , Disp: 30 Tab, Rfl: 3    tiZANidine (ZANAFLEX) 4 mg tablet, TAKE 1 TAB BY MOUTH THREE (3) TIMES DAILY AS NEEDED., Disp: 90 Tab, Rfl: 0    Comp. Stocking,Knee,Regular,Lrg misc, Use daily and take off at night, 20mmHg, Disp: 1 Packet, Rfl: 0    polyethylene glycol (MIRALAX) 17 gram/dose powder, Take as directed by office (Patient taking differently: as needed. Take as directed by office), Disp: 255 g, Rfl: 0    MULTIVIT-MIN/FA/CALCIUM/VIT K1 (ONE-A-DAY WOMEN'S 50+ PO), Take  by mouth daily. , Disp: , Rfl:     morinda citrifolia fruit 250 mg cap, Take 1 Cap by mouth daily. , Disp: , Rfl:     Magnesium Oxide 500 mg cap, Take  by mouth daily. , Disp: , Rfl:     No Known Allergies    Social History     Social History    Marital status: SINGLE     Spouse name: N/A    Number of children: N/A    Years of education: N/A     Occupational History    Not on file. Social History Main Topics    Smoking status: Never Smoker    Smokeless tobacco: Never Used    Alcohol use No    Drug use: No    Sexual activity: Yes     Partners: Male     Birth control/ protection: None     Other Topics Concern     Service No    Blood Transfusions No    Caffeine Concern Yes    Occupational Exposure No    Hobby Hazards No    Sleep Concern No    Stress Concern No    Weight Concern No    Special Diet No    Back Care No    Exercise No    Bike Helmet No    Seat Belt Yes    Self-Exams Yes     Social History Narrative       Past Surgical History:   Procedure Laterality Date    HX HYSTERECTOMY      HX KNEE REPLACEMENT      HX ORTHOPAEDIC Right Feb, 2016    TKR    HX TONSILLECTOMY         Family History:  Non-contributory.      PE:  Visit Vitals    /90    Pulse 90    Temp 98.4 °F (36.9 °C) (Oral)    Resp 18    Ht 5' 9\" (1.753 m)    Wt 254 lb 6.4 oz (115.4 kg)    SpO2 98%    BMI 37.57 kg/m2       A&O X3, NAD, well develop, well nourished  Heart: S1-S2, rrr  Lungs: CTA bilat  Abd: soft, nt, nt, + bs in all quadrants  Ext:  Pos distal pulses to DP, PT  Right knee -5- 100 degrees, instability noted in extension and mid-flexion    X-ray: right knee shows TKA with evidence of prosthetic loosening    Labs: labs were reviewed and wnl.  ua neg    A:  Right  Knee, failed TKA     P:  At this point we will move forward with surgery. Again, the alternatives, risks, complications, as well as expected outcome were discussed and the patient wishes to proceed with surgery. Pt has been instructed to stop aspirin, nsaids, rheumatologic medications and blood thinners. They have also been instructed to continue on any heart and bp meds and to take them the morning of surgery with sips of water. The patient will require in patient admission due to above stated medical conditions as well the the surgical challenges given the anatomy and bone quality.          Ame Loaiza

## 2018-05-02 NOTE — ANESTHESIA PROCEDURE NOTES
Peripheral Block    Start time: 5/2/2018 10:18 AM  End time: 5/2/2018 10:40 AM  Performed by: Guillermo Skelton  Authorized by: Guillermo Skelton       Pre-procedure: Indications: at surgeon's request and post-op pain management    Preanesthetic Checklist: patient identified, risks and benefits discussed, site marked, timeout performed, anesthesia consent given and patient being monitored    Timeout Time: 10:19          Block Type:   Block Type:  Femoral single shot  Laterality:  Right  Monitoring:  Standard ASA monitoring, continuous pulse ox, frequent vital sign checks, heart rate, responsive to questions and oxygen  Injection Technique:  Single shot  Procedures: ultrasound guided    Patient Position: supine  Prep: chlorhexidine    Location:  Upper thigh  Needle Type:  Stimuplex  Needle Gauge:  21 G  Needle Localization:  Infiltration and ultrasound guidance  Medication Injected:  0.2%  ropivacaine  Volume (mL):  30  Add'l Medication Injected:  1.0%  lidocaine  Volume (mL):  2    Assessment:  Number of attempts:  2  Injection Assessment:  Incremental injection every 5 mL, negative aspiration for CSF, no paresthesia, ultrasound image on chart, local visualized surrounding nerve on ultrasound, negative aspiration for blood and no intravascular symptoms  Patient tolerance:  Patient tolerated the procedure well with no immediate complications  Technically difficult procedure due to patient's habitus. Attempted adductor canal block but with suboptimal ultrasound image. Proceeded with standard femoral nerve block with adequate ultrasound image.

## 2018-05-02 NOTE — PROGRESS NOTES
Patients arrives Via stretcher alert awake and oriented. Lknee with immobilizer  And aguacel dressings dry and intact  Hemovac patent scant drainage of serosanguinous, no s/s of distress or sob.

## 2018-05-02 NOTE — IP AVS SNAPSHOT
[address] Patient: Yohannes Lebron MRN: [id number] EZT:[id number][birth year] About your hospitalization You were admitted on:  May 2, 2018 You last received care in the:  [facility] You were discharged on:  May 3, 2018 Why you were hospitalized Your primary diagnosis was:  Not on File Your diagnoses also included:  Failed Total Knee Replacement (Hcc) Follow-up Information Follow up With Details Comments Contact Info Kayleen Gillis NP On 5/10/2018 Appointment at 11:30 am [address] [place] [address] 1 [facility] [id number] 
[phone] DYLAN Hunter On 5/17/2018 Appointment at 11:00 am at the [place] [address] [address] [place] [address] 
[phone] Your Scheduled Appointments Thursday May 10, 2018 11:30 AM EDT TRANSITIONAL CARE MANAGEMENT with Kayleen Gillis NP [facility] Main Office ([address] [place] [address]) 14 [facility] 1 [facility] [address]  
[phone] Thursday May 17, 2018 11:00 AM EDT  
POST OP with Eunice Foy, [facility] [address] 1 [facility] [address]  
[phone] Discharge Orders Procedure Order Date Status Priority Quantity Spec Type Associated Dx WALKER STANDARD 05/03/18 0830 Normal Routine 1  Failure of total knee replacement, subsequent encounter [[id number]] ELEVATED TOILET SEAT 05/03/18 0830 Normal Routine 1  Failure of total knee replacement, subsequent encounter [[id number]] COMMODE CHAIR 05/03/18 0830 Normal Routine 1  Failure of total knee replacement, subsequent encounter [[id number]] SHOWER CHAIR 05/03/18 0830 Normal Routine 1  Failure of total knee replacement, subsequent encounter [[id number]]  REFERRAL TO HOME HEALTH 05/03/18 0830 Normal Routine 1  Failure of total knee replacement, subsequent encounter [[id number]] Comments: Total knee protocol, wbat Hinged brace on for ambulation Aspirin therapy 
aquacel ag dressing pod 7 and prn A check enoc indicates which time of day the medication should be taken. My Medications START taking these medications Instructions Each Dose to Equal  
 Morning Noon Evening Bedtime  
 buffered aspirin 325 mg tablet Commonly known as:  BUFFERIN Your last dose was: Your next dose is: Take 1 Tab by mouth two (2) times a day. 325 mg  
    
   
   
   
  
 celecoxib 200 mg capsule Commonly known as:  CELEBREX Your last dose was: Your next dose is: Take 1 Cap by mouth two (2) times a day for 90 days. 200 mg  
    
   
   
   
  
 oxyCODONE-acetaminophen  mg per tablet Commonly known as:  PERCOCET Your last dose was: Your next dose is: Take 1-2 Tabs by mouth every four (4) hours as needed for Pain. Max Daily Amount: 12 Tabs. 1-2 Tab CHANGE how you take these medications Instructions Each Dose to Equal  
 Morning Noon Evening Bedtime  
 polyethylene glycol 17 gram/dose powder Commonly known as:  Jose Hamm What changed:   
- when to take this 
- reasons to take this 
- additional instructions Your last dose was: Your next dose is: Take as directed by office CONTINUE taking these medications Instructions Each Dose to Equal  
 Morning Noon Evening Bedtime  
 allopurinol 100 mg tablet Commonly known as:  Carolynn Joseph Your last dose was: Your next dose is: Take 1 Tab by mouth two (2) times a day. 100 mg  
    
   
   
   
  
 amLODIPine 10 mg tablet Commonly known as:  Nubia Enciso Your last dose was: Your next dose is: TAKE 1 TABLET BY MOUTH DAILY atorvastatin 20 mg tablet Commonly known as:  LIPITOR Your last dose was: Your next dose is: TAKE 1 TABLET BY MOUTH DAILY  
     
   
   
   
  
 citalopram 20 mg tablet Commonly known as:  Miladys Guillory Your last dose was: Your next dose is: TAKE 1 TAB BY MOUTH DAILY. [address]. [address] Your last dose was: Your next dose is:    
   
   
 Use daily and take off at night, 20mmHg  
     
   
   
   
  
 ferrous sulfate 325 mg (65 mg iron) tablet Your last dose was: Your next dose is: TAKE 1 TABLET BY MOUTH TWICE A DAY WITH MEALS  
     
   
   
   
  
 fexofenadine 180 mg tablet Commonly known as:  Jami Mare Your last dose was: Your next dose is: Take 1 Tab by mouth daily. 180 mg  
    
   
   
   
  
 losartan-hydroCHLOROthiazide 100-25 mg per tablet Commonly known as:  HYZAAR Your last dose was: Your next dose is: TAKE 1 TABLET BY MOUTH DAILY. Magnesium Oxide 500 mg Cap Your last dose was: Your next dose is: Take  by mouth daily. morinda citrifolia fruit 250 mg Cap Your last dose was: Your next dose is: Take 1 Cap by mouth daily. 1 Cap ONE-A-DAY WOMEN'S 50+ PO Your last dose was: Your next dose is: Take  by mouth daily. tiZANidine 4 mg tablet Commonly known as:  Josse Bush Your last dose was: Your next dose is: TAKE 1 TAB BY MOUTH THREE (3) TIMES DAILY AS NEEDED. Where to Get Your Medications Information on where to get these meds will be given to you by the nurse or doctor. ! Ask your nurse or doctor about these medications  
  buffered aspirin 325 mg tablet celecoxib 200 mg capsule  
 oxyCODONE-acetaminophen  mg per tablet Opioid Education Prescription Opioids: What You Need to Know: 
 
 
 
 
 
 
PATIENT INSTRUCTIONS: 
 
After general anesthesia or intravenous sedation, for 24 hours or while taking prescription Narcotics: · Limit your activities · Do not drive and operate hazardous machinery · Do not make important personal or business decisions · Do  not drink alcoholic beverages · If you have not urinated within 8 hours after discharge, please contact your surgeon on call. Report the following to your surgeon: 
· Excessive pain, swelling, redness or odor of or around the surgical area · Temperature over 100.5 · Nausea and vomiting lasting longer than 4 hours or if unable to take medications · Any signs of decreased circulation or nerve impairment to extremity: change in color, persistent  numbness, tingling, coldness or increase pain · Any questions Follow-up Appointments Procedures  FOLLOW UP VISIT Appointment in: Two Weeks Standing Status:   Standing Number of Occurrences:   1 Order Specific Question:   Appointment in Answer: Two Weeks What to do at Home: 
Recommended activity: hinged knee brace on right leg at all times. *  Please give a list of your current medications to your Primary Care Provider. *  Please update this list whenever your medications are discontinued, doses are 
    changed, or new medications (including over-the-counter products) are added. *  Please carry medication information at all times in case of emergency situations. These are general instructions for a healthy lifestyle: No smoking/ No tobacco products/ Avoid exposure to second hand smoke Surgeon General's Warning:  Quitting smoking now greatly reduces serious risk to your health. Obesity, smoking, and sedentary lifestyle greatly increases your risk for illness A healthy diet, regular physical exercise & weight monitoring are important for maintaining a healthy lifestyle You may be retaining fluid if you have a history of heart failure or if you experience any of the following symptoms:  Weight gain of 3 pounds or more overnight or 5 pounds in a week, increased swelling in our hands or feet or shortness of breath while lying flat in bed. Please call your doctor as soon as you notice any of these symptoms; do not wait until your next office visit. Recognize signs and symptoms of STROKE: 
 
F-face looks uneven A-arms unable to move or move unevenly S-speech slurred or non-existent T-time-call 911 as soon as signs and symptoms begin-DO NOT go Back to bed or wait to see if you get better-TIME IS BRAIN. The discharge information has been reviewed with the patient. The patient verbalized understanding. [employer] Activation Thank you for requesting access to [employer]. Please follow the instructions below to securely access and download your online medical record. [employer] allows you to send messages to your doctor, view your test results, renew your prescriptions, schedule appointments, and more. How Do I Sign Up? 1. In your internet browser, go to [email or web address]. 2. Click on the First Time User? Click Here link in the Sign In box.  You will see the New Member Sign Up page. 3. Enter your CircleCI Access Code exactly as it appears below. You will not need to use this code after youve completed the sign-up process. If you do not sign up before the expiration date, you must request a new code. CircleCI Access Code: [email or web address] Expires: 2012  9:42 AM (This is the date your CircleCI access code will ) 4. Enter the last four digits of your Social Security Number (xxxx) and Date of Birth (mm/dd/yyyy) as indicated and click Submit. You will be taken to the next sign-up page. 5. Create a Particle Codet ID. This will be your CircleCI login ID and cannot be changed, so think of one that is secure and easy to remember. 6. Create a CircleCI password. You can change your password at any time. 7. Enter your Password Reset Question and Answer. This can be used at a later time if you forget your password. 8. Enter your e-mail address. You will receive e-mail notification when new information is available in [address] 9. Click Sign Up. You can now view and download portions of your medical record. 10. Click the Download Summary menu link to download a portable copy of your medical information. Additional Information If you have questions, please visit the Frequently Asked Questions section of the CircleCI website at [email or web address] Remember, CircleCI is NOT to be used for urgent needs. For medical emergencies, dial 911. Armband removed and shredded Discharge Instructions for Total Knee Replacement Patients · The dressing on your knee will be changed by the Home Health professional at the appropriate time. Keep your incision clean and dry. Do not apply any ointments to the incision. · You may shower as long as you keep you incision dry. When showering, leave your dressing on. The dressing is waterproof as long as the edges are sealed. · Notify your surgeon if: · Your temperature is greater than 100.5 · You have pain not controlled by your pain medication · You have increased drainage from your incision · You have increased redness or swelling in your leg · You have chest pain, shortness of breath, or any other problems · Do your exercises as instructed by the home physical therapist. 
 
· Bend and straighten your operative leg every hour. Walk once an hour during normal walking hours. · During periods of inactivity or rest, your leg should be elevated with a rolled towel or folded pillow under the heel to keep the knee straight. · Do not place anything under the knee. · Do not sit in a recliner chair with the footrest elevated. · You may use ice to your knee for 20-30 minutes after exercise and as needed. Do not apply the ice pack directly to your skin. Use a barrier such as your pant leg or a thin towel. · If you have KINGSLEY hose (the white support stockings), remove them at bedtime and re-apply the hose in the morning for the next 2 weeks. Best of luck with your new knee and [address] YOU for choosing the [address]! [employer] Announcement We are excited to announce that we are making your provider's discharge notes available to you in [employer] Software. You will see these notes when they are completed and signed by the physician that discharged you from your recent hospital stay. If you have any questions or concerns about any information you see in [employer]odo Software, please call the Health Information Department where you were seen or reach out to your Primary Care Provider for more information about your plan of care. Introducing [facility]! Ani Rodriguez introduces [employer] Software patient portal. Now you can access parts of your medical record, email your doctor's office, and request medication refills online. 1. In your internet browser, go to [email or web address] 2. Click on the First Time User? Click Here link in the Sign In box. You will see the New Member Sign Up page. 3. Enter your Ameri-tech 3D Access Code exactly as it appears below. You will not need to use this code after youve completed the sign-up process. If you do not sign up before the expiration date, you must request a new code. · Ameri-tech 3D Access Code: [email or web address] Expires: 5/30/2018 12:02 PM 
 
4. Enter the last four digits of your Social Security Number (xxxx) and Date of Birth (mm/dd/yyyy) as indicated and click Submit. You will be taken to the next sign-up page. 5. Create a Ameri-tech 3D ID. This will be your Ameri-tech 3D login ID and cannot be changed, so think of one that is secure and easy to remember. 6. Create a Amer[employer]-tech 3D password. You can change your password at any time. 7. Enter your Password Reset Question and Answer. This can be used at a later time if you forget your password. 8. Enter your e-mail address. You will receive e-mail notification when new information is available in [address] 9. Click Sign Up. You can now view and download portions of your medical record. 10. Click the Download Summary menu link to download a portable copy of your medical information. If you have questions, please visit the Frequently Asked Questions section of the Ameri-tech 3D website. Remember, Ameri-tech 3D is NOT to be used for urgent needs. For medical emergencies, dial 911. Now available from your iPhone and Android! Introducing Juliano Badillo As a Coral Slimmer patient, I wanted to make you aware of our electronic visit tool called Juliano Badillo. Perlita Ketanmer 24/7 allows you to connect within minutes with a medical provider 24 hours a day, seven days a week via a mobile device or tablet or logging into a secure website from your computer. You can access Juliano Badillo from anywhere in the [place].  
 
A virtual visit might be right for you when you have a simple condition and feel like you just dont want to get out of bed, or cant get away from work for an appointment, when your regular Ani Reusing provider is not available (evenings, weekends or holidays), or when youre out of town and need minor care. Electronic visits cost only $49 and if the Ani Reusing 24/7 provider determines a prescription is needed to treat your condition, one can be electronically transmitted to a nearby pharmacy*. Please take a moment to enroll today if you have not already done so. The enrollment process is free and takes just a few minutes. To enroll, please download the Ani Reusing 24/7 sebas to your tablet or phone, or visit [email or web address] to enroll on your computer. And, as an [address] [place] [address] patient with a CleverAds account, the results of your visits will be scanned into your electronic medical record and your primary care provider will be able to view the scanned results. We urge you to continue to see your regular Ain Reusing provider for your ongoing medical care. And while your primary care provider may not be the one available when you seek a Zipwhip virtual visit, the peace of mind you get from getting a real diagnosis real time can be priceless. For more information on Zipwhip, view our Frequently Asked Questions (FAQs) at [email or web address]. Sincerely, 
 
Yokasta Wright MD 
Chief Medical Officer Gibson Rabago *:  certain medications cannot be prescribed via Zipwhip Unresulted Labs-Please follow up with your PCP about these lab tests Order Current Status NC XR TECHNOLOGIST SERVICE In process Providers Seen During Your Hospitalization Provider Specialty Primary office phone Rory Rodriguez, [facility] Orthopedic Surgery [phone] Your Primary Care Physician (PCP) Primary Care Physician Office Phone Office Fax Sarmad Patel [phone] You are allergic to the following No active allergies Recent Documentation Height Weight BMI OB Status Smoking Status 1.753 m 115.2 kg 37.51 kg/m2 Hysterectomy Never Smoker Emergency Contacts Name Discharge Info Relation Home Work Mobile Aubrey Chappell DISCHARGE CAREGIVER [3] Other Relative [6] [phone] Patient Belongings The following personal items are in your possession at time of discharge: 
  Dental Appliances: None  Visual Aid: Glasses      Home Medications: None   Jewelry: None  Clothing: Pants, Shirt, Socks, Footwear, Undergarments    Other Valuables: None Discharge Instructions Attachments/References ASPIRIN (BY MOUTH) (ENGLISH) CELECOXIB (BY MOUTH) (ENGLISH) OXYCODONE/ACETAMINOPHEN (BY MOUTH) (ENGLISH) Patient Handouts Aspirin (By mouth) Aspirin (AS-pir-in) Treats pain, fever, and inflammation. May lower risk of heart attack and stroke. Brand Name(s): Ascriptin Regular Strength, Aspergum, Aspir Low, Aspirin Adult Low Dose, Aspirin Low Dose, Justin Aspirin Children's, Justin Aspirin Regimen, Justin Extra Strength, Justin Genuine Aspirin, Justin Low Dose, Bufferin, Bufferin Low Dose, Durlaza, Ecotrin, Ecpirin There may be other brand names for this medicine. When This Medicine Should Not Be Used: This medicine is not right for everyone. Do not use it if you had an allergic reaction to aspirin or other NSAIDs, or if you have a history of asthma with nasal polyps and rhinitis. How to Use This Medicine:  
Delayed Release Capsule, Long Acting Capsule, Gum, Tablet, Chewable Tablet, Fizzy Tablet, Coated Tablet, Long Acting Tablet, 24 Hour Capsule · Your doctor will tell you how much medicine to use. Do not use more than directed. · It is best to take this medicine with food or milk. · Capsule, tablet, or coated tablet: Swallow whole. Do not crush, break, or chew it. · Chewable tablet: You may chew it completely or swallow it whole. · Gum: Chew completely to make sure you get as much medicine as possible. Drink a full glass (8 ounces) of water after chewing the gum. · Swallow the extended-release capsule whole. Do not crush, break, or chew it. Take the capsule with a full glass of water at the same time each day. · Follow the instructions on the medicine label if you are using this medicine without a prescription. · Missed dose: If you miss a dose of Durlaza, skip the missed dose and go back to your regular dosing schedule. Do not take extra medicine to make up for a missed dose. · Store the medicine in a closed container at room temperature, away from heat, moisture, and direct light. Drugs and Foods to Avoid: Ask your doctor or pharmacist before using any other medicine, including over-the-counter medicines, vitamins, and herbal products. · Some foods and medicines can affect how aspirin works. Tell your doctor if you are using any of the following: ¨ Dipyridamole, methotrexate, probenecid, sulfinpyrazone, ticlopidine ¨ Blood thinner (including clopidogrel, prasugrel, ticagrelor, warfarin) ¨ Blood pressure medicine ¨ Medicine to treat seizures (including phenytoin, valproic acid) ¨ NSAID pain or arthritis medicine (including celecoxib, diclofenac, ibuprofen, naproxen) ¨ Steroid medicine (including dexamethasone, hydrocortisone, methylprednisolone, prednisolone, prednisone) · Do not take Durlaza 2 hours before or 1 hour after you drink alcohol or take medicines that contain alcohol. Warnings While Using This Medicine: · Tell your doctor if you are pregnant or breastfeeding. Do not use this medicine during the later part of a pregnancy unless your doctor tells you to. · Tell your doctor if you have kidney disease, liver disease, high blood pressure, heart disease, or a history of stomach bleeding or ulcers. · This medicine may increase your risk for bleeding, including stomach ulcers. · Do not give aspirin to a child or teenager who has chickenpox or flu symptoms, unless the doctor says it is okay. Aspirin can cause a life-threatening reaction called Reye syndrome. · Tell any doctor or dentist who treats you that you are using this medicine. This medicine may affect certain medical test results. · Keep all medicine out of the reach of children. Never share your medicine with anyone. Possible Side Effects While Using This Medicine:  
Call your doctor right away if you notice any of these side effects: · Allergic reaction: Itching or hives, swelling in your face or hands, swelling or tingling in your mouth or throat, chest tightness, trouble breathing · Bloody or black stools, bloody vomit or vomit that looks like coffee grounds · Chest tightness, wheezing · Ringing in the ears · Severe stomach pain · Unusual bleeding, bruising, or weakness If you notice other side effects that you think are caused by this medicine, tell your doctor. Call your doctor for medical advice about side effects. You may report side effects to FDA at 8-113-FDA-6745 © 2017 [address] Information is for End User's use only and may not be sold, redistributed or otherwise used for commercial purposes. The above information is an  only. It is not intended as medical advice for individual conditions or treatments. Talk to your doctor, nurse or pharmacist before following any medical regimen to see if it is safe and effective for you. Celecoxib (By mouth) Celecoxib (yxd-o-HWZ-ib) Treats pain. This medicine is an NSAID. Brand Name(s): CeleBREX, SmartRx CapXib Kit There may be other brand names for this medicine. When This Medicine Should Not Be Used: This medicine is not right for everyone.  Do not use it if you had an allergic reaction (including asthma) to celecoxib, aspirin, NSAIDs, or a sulfa drug (such as sulfamethoxazole). Do not use this medicine right before or right after coronary artery bypass graft (CABG). How to Use This Medicine:  
Capsule · Your doctor will tell you how much medicine to use. Do not use more than directed. · It is best to take this medicine with food or milk so it does not upset your stomach. · Use this medicine for the shortest time possible and in the smallest dose possible. This will help lower the risk of side effects. · If you cannot swallow the capsule, you may open it and pour the medicine into a teaspoon of applesauce. Stir the mixture well and swallow right away. Drink enough water to make sure you swallow all of the medicine. · This medicine should come with a Medication Guide. Ask your pharmacist for a copy if you do not have one. · Missed dose: Take a dose as soon as you remember. If it is almost time for your next dose, wait until then and take a regular dose. Do not take extra medicine to make up for a missed dose. · Store the medicine in a closed container at room temperature, away from heat, moisture, and direct light. Any medicine that has been mixed with applesauce may be stored in a refrigerator and used within 6 hours. Drugs and Foods to Avoid: Ask your doctor or pharmacist before using any other medicine, including over-the-counter medicines, vitamins, and herbal products. · Some medicines and foods can affect how celecoxib works. Tell your doctor if you are taking any of the following: ¨ A blood thinner, such as warfarin ¨ A diuretic (water pill), such as furosemide, hydrochlorothiazide (HCTZ), torsemide ¨ Blood pressure medicine, such as enalapril, lisinopril, losartan, olmesartan, valsartan ¨ Fluconazole ¨ Lithium ¨ Other pain or arthritis medicine, such as aspirin, diclofenac, ibuprofen, naproxen ¨ Steroid medicine, such as hydrocortisone, methylprednisolone, prednisone · Do not drink alcohol while you are using this medicine. Warnings While Using This Medicine: · Tell your doctor if you are pregnant or breastfeeding. Do not use this medicine during the later part of pregnancy, unless your doctor tells you to. · Tell your doctor if you have a history of ulcers or other stomach problems, kidney or liver disease, anemia, aspirin-sensitive asthma, high blood pressure, congestive heart failure, or other heart or circulation problems. · This medicine may cause the following problems: ¨ A serious liver problem ¨ Bleeding in your stomach or intestines ¨ Increased risk for a heart attack or stroke ¨ Risk for disseminated intravascular coagulation (bleeding problem) in children younger than 18 years · Tell any doctor or dentist who treats you that you are using this medicine. · Your doctor will do lab tests at regular visits to check on the effects of this medicine. Keep all appointments. · Keep all medicine out of the reach of children. Never share your medicine with anyone. Possible Side Effects While Using This Medicine:  
Call your doctor right away if you notice any of these side effects: · Allergic reaction: Itching or hives, swelling in your face or hands, swelling or tingling in your mouth or throat, chest tightness, trouble breathing · Blistering, peeling, red skin rash · Bloody or black, tarry stools · Change in how much or how often you urinate, bloody or cloudy urine · Chest pain, shortness of breath, or coughing up blood · Fast or slow heartbeat · Dark urine or pale stools, nausea, vomiting, loss of appetite, stomach pain, yellow skin or eyes · Numbness or weakness in your arm or leg, or on one side of your body · Pain in your calf · Shortness of breath, cold sweat, and bluish skin · Sudden or severe headache, dizziness, or problems with vision, speech, or walking · Swelling in your hands, ankles, or feet, rapid weight gain · Unusual tiredness or weakness, pale skin · Vomiting blood or material that looks like coffee grounds If you notice these less serious side effects, talk with your doctor: · Mild skin rash · Muscle or joint pain If you notice other side effects that you think are caused by this medicine, tell your doctor. Call your doctor for medical advice about side effects. You may report side effects to FDA at 6-685-FDA-7322 © 2017 [address] Information is for End User's use only and may not be sold, redistributed or otherwise used for commercial purposes. The above information is an  only. It is not intended as medical advice for individual conditions or treatments. Talk to your doctor, nurse or pharmacist before following any medical regimen to see if it is safe and effective for you. Oxycodone/Acetaminophen (By mouth) Acetaminophen (t-pwzm-l-MIN-oh-fen), Oxycodone Hydrochloride (ox-z-TGB-done per-droe-KLOR-alessio) Treats moderate to moderately severe pain. This medicine is a narcotic pain reliever. Brand Name(s): Endocet, Percocet, Primlev, Xartemis XR There may be other brand names for this medicine. When This Medicine Should Not Be Used: This medicine is not right for everyone. Do not use it if you had an allergic reaction to acetaminophen or oxycodone, or if you have serious breathing problems or paralytic ileus. How to Use This Medicine:  
Capsule, Liquid, Tablet, Long Acting Tablet · Your doctor will tell you how much medicine to use. Do not use more than directed. · An overdose can be dangerous. Follow directions carefully so you do not get too much medicine at one time. · Oral liquid: Measure the oral liquid medicine with a marked measuring spoon, oral syringe, or medicine cup. · Swallow the extended-release tablet whole.  Do not crush, break, or chew it. Do not lick or wet the tablet before placing it in your mouth. Do not give this medicine through a feeding tube. · This medicine should come with a Medication Guide. Ask your pharmacist for a copy if you do not have one. · Missed dose: If you miss a dose of this medicine, skip the missed dose and go back to your regular dosing schedule. Do not double doses. · Store the medicine in a closed container at room temperature, away from heat, moisture, and direct light. Ask your pharmacist about the best way to dispose of medicine you do not use. Drugs and Foods to Avoid: Ask your doctor or pharmacist before using any other medicine, including over-the-counter medicines, vitamins, and herbal products. · Do not use Xartemis XR if you are using or have used an MAO inhibitor in the past 14 days. · Some medicines can affect how this medicine works. Tell your doctor if you are using any of the following: ¨ Carbamazepine, erythromycin, ketoconazole, lamotrigine, mirtazapine, naltrexone, phenytoin, propranolol, rifampin, ritonavir, tramadol, trazodone, or zidovudine ¨ Birth control pills ¨ Diuretic (water pill) ¨ Medicine to treat depression ¨ Phenothiazine medicine ¨ Triptan medicine to treat migraine headaches · Do not drink alcohol while you are using this medicine. Acetaminophen can damage your liver, and alcohol can increase this risk. Do not take acetaminophen without asking your doctor if you have 3 or more drinks of alcohol every day. · Tell your doctor if you use anything else that makes you sleepy. Some examples are allergy medicine, narcotic pain medicine, and alcohol. Tell your doctor if you are using buprenorphine, butorphanol, nalbuphine, pentazocine, a benzodiazepine, or a muscle relaxer. Warnings While Using This Medicine: · Tell your doctor if you are pregnant or breastfeeding, or if you have kidney disease, liver disease, heart disease, low blood pressure, breathing problems or lung disease (such as asthma, COPD), thyroid problems, Josiah disease, pancreas or gallbladder problems, prostate problems, trouble urinating, or a stomach problems, or a history of head injury or brain damage, seizures, or alcohol or drug abuse. Tell your doctor if you are allergic to codeine. · This medicine may cause the following problems: 
¨ High risk of overdose, which can lead to death ¨ Respiratory depression (serious breathing problem that can be life-threatening) ¨ Liver problems ¨ Serious skin reactions ¨ Serotonin syndrome (when used with certain medicines) · This medicine may make you dizzy or drowsy. Do not drive or do anything that could be dangerous until you know how this medicine affects you. Sit or lie down if you feel dizzy. Stand up carefully. · This medicine contains acetaminophen. Read the labels of all other medicines you are using to see if they also contain acetaminophen, or ask your doctor or pharmacist. Sanchez Hendricks not use more than 4 grams (4,000 milligrams) total of acetaminophen in one day. · This medicine can be habit-forming. Do not use more than your prescribed dose. Call your doctor if you think your medicine is not working. · Do not stop using this medicine suddenly. Your doctor will need to slowly decrease your dose before you stop it completely. · This medicine could cause infertility. Talk with your doctor before using this medicine if you plan to have children. · This medicine may cause constipation, especially with long-term use. Ask your doctor if you should use a laxative to prevent and treat constipation. · Keep all medicine out of the reach of children. Never share your medicine with anyone. Possible Side Effects While Using This Medicine:  
Call your doctor right away if you notice any of these side effects: · Allergic reaction: Itching or hives, swelling in your face or hands, swelling or tingling in your mouth or throat, chest tightness, trouble breathing · Anxiety, restlessness, fast heartbeat, fever, muscle spasms, twitching, diarrhea, seeing or hearing things that are not there · Blistering, peeling, red skin rash · Blue lips, fingernails, or skin · Dark urine or pale stools, loss of appetite, stomach pain, yellow skin or eyes · Extreme weakness, shallow breathing, uneven heartbeat, seizures, sweating, or cold or clammy skin · Severe confusion, lightheadedness, dizziness, or fainting · Severe constipation, nausea, or vomiting · Trouble breathing or slow breathing If you notice these less serious side effects, talk with your doctor:  
· Headache · Mild constipation, nausea, or vomiting · Mild sleepiness or drowsiness If you notice other side effects that you think are caused by this medicine, tell your doctor. Call your doctor for medical advice about side effects. You may report side effects to FDA at 7-871-FDA-3163 © 2017 [address] Information is for End User's use only and may not be sold, redistributed or otherwise used for commercial purposes. The above information is an  only. It is not intended as medical advice for individual conditions or treatments. Talk to your doctor, nurse or pharmacist before following any medical regimen to see if it is safe and effective for you. Please provide this summary of care documentation to your next provider. Signatures-by signing, you are acknowledging that this After Visit Summary has been reviewed with you and you have received a copy. Patient Signature:  ____________________________________________________________ Date:  ____________________________________________________________  
  
Lory Finger Provider Signature:  ____________________________________________________________ Date:  ____________________________________________________________

## 2018-05-02 NOTE — ANESTHESIA PREPROCEDURE EVALUATION
Anesthetic History   No history of anesthetic complications            Review of Systems / Medical History  Patient summary reviewed and pertinent labs reviewed    Pulmonary  Within defined limits                 Neuro/Psych   Within defined limits          Comments: Chronic pain Cardiovascular    Hypertension              Exercise tolerance: >4 METS     GI/Hepatic/Renal  Within defined limits              Endo/Other        Obesity, arthritis and anemia    Comments: Borderline diabetes - attempting diet control Other Findings   Comments: Documentation of current medication  Current medications obtained, documented and obtained? YES      Risk Factors for Postoperative nausea/vomiting:       History of postoperative nausea/vomiting? NO       Female? YES       Motion sickness? NO       Intended opioid administration for postoperative analgesia? YES      Smoking Abstinence:  Current Smoker? NO  Elective Surgery? YES  Seen preoperatively by anesthesiologist or proxy prior to day of surgery? YES  Pt abstained from smoking 24 hours prior to anesthesia?  YES    Preventive care/screening for High Blood Pressure:  Aged 18 years and older: YES  Screened for high blood pressure: YES  Patients with high blood pressure referred to primary care provider   for BP management: YES                 Physical Exam    Airway  Mallampati: I  TM Distance: > 6 cm  Neck ROM: normal range of motion   Mouth opening: Normal     Cardiovascular  Regular rate and rhythm,  S1 and S2 normal,  no murmur, click, rub, or gallop             Dental  No notable dental hx       Pulmonary  Breath sounds clear to auscultation               Abdominal  GI exam deferred       Other Findings            Anesthetic Plan    ASA: 3  Anesthesia type: general      Post-op pain plan if not by surgeon: peripheral nerve block single    Induction: Intravenous  Anesthetic plan and risks discussed with: Patient

## 2018-05-02 NOTE — PROGRESS NOTES
Mobility Intervention:       [] Pt dangled at edge of bed    [] Pt assisted OOB to bedside commode    [x] Pt assisted OOB to chair    [x] Pt ambulated to bathroom    [] Patient was ambulated in room/hallway    Assistive Device Utilized:       [x] Rolling walker   [] Crutches   [] Straight Cane   [x] Knee immobilizer   [] IV pole    After Mobilization:     [x] Patient left in no apparent distress sitting up in chair  [] Patient left in no apparent distress in bed  [x] Call bell left within reach  [x] SCDs on & machine turned on  [] Ice applied  [] RN notified  [] Caregiver present  [] Bed alarm activated    Reason patient not mobilized:      [] Patient refused   [] Nausea/vomiting   [] Low blood pressure   [] Drowsy/lethargic    Pain Rating:     [x] 0  [] 1  Assistive Device:        [] 2  [] 3  [] 4  [] 5  [] 6  Assistive Device:        [] 7  [] 8  [] 9  [] 10    Comments:

## 2018-05-02 NOTE — BRIEF OP NOTE
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  Item Serial No.  Lot No. LRB No. Used Action   CEMENT BNE SIMPLEX TOBRA 4 --  - [id number]  CEMENT BNE SIMPLEX TOBRA 4 --   [employer] HOW [id number] Implanted   CEMENT BNE SIMPLEX TOBRA 4 --  - [id number]  CEMENT BNE SIMPLEX TOBRA 4 --   [employer] HOW [id number] Implanted   CEMENT BNE SIMPLEX TOBRA 4 --  - [id number]  CEMENT BNE SIMPLEX TOBRA 4 --   [employer] HOW [id number] Implanted   INSERT TIB RP TC3 SZ 2.5 12.5 --  - [id number]   INSERT TIB RP TC3 SZ 2.5 12.5 --    [facility] [employer] [id number] Implanted

## 2018-05-02 NOTE — PERIOP NOTES
TRANSFER - OUT REPORT:    Verbal report given to Marielena RN(name) on Tiffany Sanchez  being transferred to [address] [place] [address](unit) for routine post - op       Report consisted of patients Situation, Background, Assessment and   Recommendations(SBAR). Information from the following report(s) SBAR, Kardex, OR Summary, Procedure Summary, Intake/Output, MAR, Recent Results and Med Rec Status was reviewed with the receiving nurse. Lines:   Peripheral IV 05/02/18 Left Hand (Active)   Site Assessment Clean, dry, & intact 5/2/2018  1:49 PM   Phlebitis Assessment 0 5/2/2018  1:49 PM   Infiltration Assessment 0 5/2/2018  1:49 PM   Dressing Status Clean, dry, & intact 5/2/2018  1:49 PM   Dressing Type Tape;Transparent 5/2/2018  1:49 PM   Hub Color/Line Status Pink; Infusing 5/2/2018  1:49 PM        Opportunity for questions and clarification was provided.       Patient transported with:   Cover

## 2018-05-02 NOTE — PROGRESS NOTES
Problem: Mobility Impaired (Adult and Pediatric)  Goal: *Acute Goals and Plan of Care (Insert Text)  Physical Therapy Goals  Initiated 5/2/2018 and to be accomplished within 7 day(s)  1. Patient will move from supine to sit and sit to supine , scoot up and down and roll side to side in bed with supervision/set-up. 2.  Patient will transfer from bed to chair and chair to bed with supervision/set-up using the least restrictive device. 3.  Patient will perform sit to stand with supervision/set-up. 4.  Patient will ambulate with supervision/set-up for 400 feet with the least restrictive device. Outcome: Progressing Towards Goal  physical Therapy EVALUATION    Patient: Jadyn Fletcher (6 y.o. 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 Patient is a 65 y.o. female admitted to the hospital due to s/p R TKA revision Dr. Amirah Granados 5/2/18. Pt to wear hinged knee brace at all times (currently locked between 0-90 degrees). Patient consents to PT evaluation and treatment. Pt performs supine to sit and scooting to edge of bed supervision. Pt performs sit to stand CGA. Gait in hallway 250 feet SBA/CGA RW. Educated pt on ankle pumps, heel slides, and SLR. Educated pt on OOB 3-5 times a day with nursing assistance. Educated pt on using hinged knee brace at all times. Pt ended therapy sitting up in chair with all needs met with towel roll under ankle and ice donned. Patient will benefit from skilled intervention to address the above impairments and increase functional independence.   Patients rehabilitation potential is considered to be Good  Factors which may influence rehabilitation potential include:   []         None noted  []         Mental ability/status  []         Medical condition  []         Home/family situation and support systems  []         Safety awareness  []         Pain tolerance/management  []         Other:      PLAN :  Recommendations and Planned Interventions:  [x]           Bed Mobility Training             [x]    Neuromuscular Re-Education  [x]           Transfer Training                   []    Orthotic/Prosthetic Training  [x]           Gait Training                          []    Modalities  [x]           Therapeutic Exercises          []    Edema Management/Control  [x]           Therapeutic Activities            [x]    Patient and Family Training/Education  []           Other (comment):    Frequency/Duration: Patient will be followed by physical therapy 1-2 times per day to address goals. Discharge Recommendations: Home Health  Further Equipment Recommendations for Discharge: rolling walker     SUBJECTIVE:   Patient stated I'm ready for therapy.     OBJECTIVE DATA SUMMARY:     Past Medical History:   Diagnosis Date    Arthritis     Elevated cholesterol     Hypertension     Joint pain     knees     Past Surgical History:   Procedure Laterality Date    HX HYSTERECTOMY      HX KNEE REPLACEMENT      HX ORTHOPAEDIC Right Feb, 2016    TKR    HX TONSILLECTOMY       Barriers to Learning/Limitations: yes;  altered mental status (i.e.Sedation, Confusion)  Compensate with: Visual Cues, Verbal Cues and Tactile Cues  Prior Level of Function/Home Situation: Independent with all mobility including gait no AD. Home Situation  Home Environment: Private residence  # Steps to Enter: 0  One/Two Story Residence: One story  Living Alone: No  Support Systems: Family member(s)  Patient Expects to be Discharged to[de-identified] Private residence  Current DME Used/Available at Home: [employer] Shower chair  Critical Behavior:  Neurologic State: Alert  Psychosocial  Patient Behaviors: Calm; Cooperative  Family  Behaviors: Calm  Purposeful Interaction: Yes  Caring Interventions: Reassure  Strength:    Strength:  (B LE, L LE WFL, R LE 3- to 3/5)  Tone & Sensation:   Tone: Normal (B LE)  Sensation: Intact (B LE)   Range Of Motion:  AROM:  (B LE, WFL except R knee 5-70 degrees)  Functional Mobility:  Bed Mobility:  Supine to Sit: Supervision  Scooting: Supervision  Transfers:  Sit to Stand: Contact guard assistance  Stand to Sit: Stand-by assistance  Balance:   Sitting: Intact  Standing: Impaired; With support  Standing - Static: Good  Standing - Dynamic : Fair  Ambulation/Gait Training:  Distance (ft): 250 Feet (ft)  Assistive Device: Walker, rolling  Ambulation - Level of Assistance: Stand-by assistance;Contact guard assistance  Speed/La: Pace decreased (<100 feet/min)  Therapeutic Exercises:   Performed ankle pumps, heel slides, and SLR x 10 reps. Pain:  Pre: 0/10 R knee  Post: mild R knee  Activity Tolerance:   good  Please refer to the flowsheet for vital signs taken during this treatment. After treatment:   [x] Patient left in no apparent distress sitting up in chair  [] Patient left sitting on EOB  [] Patient left in no apparent distress in bed  [] Patient declined to be OOB at this time due to    [x] Call bell left within reach  [x] Nursing notified(Fe)  [x] Caregiver present  [] Bed alarm activated  [x] Personal items in reach     COMMUNICATION/EDUCATION:   [x]         Fall prevention education was provided and the patient/caregiver indicated understanding. [x]         Patient/family have participated as able in goal setting and plan of care. [x]         Patient/family agree to work toward stated goals and plan of care. []         Patient understands intent and goals of therapy, but is neutral about his/her participation. []         Patient is unable to participate in goal setting and plan of care.     Thank you for this referral.  Buzz Wilcox, PT, DPT   Time Calculation: 30 mins      Mobility  Current  CJ= 20-39%   Goal  CI= 1-19%. The severity rating is based on the Level of Assistance required for Functional Mobility and ADLs.     Eval Complexity: History: MEDIUM  Complexity : 1-2 comorbidities / personal factors will impact the outcome/ POC Exam:HIGH Complexity : 4+ Standardized tests and measures addressing body structure, function, activity limitation and / or participation in recreation  Presentation: LOW Complexity : Stable, uncomplicated  Clinical Decision Making:Low Complexity   Overall Complexity:LOW

## 2018-05-02 NOTE — ANESTHESIA POSTPROCEDURE EVALUATION
Post-Anesthesia Evaluation and Assessment    Patient: Yohannes Lebron MRN: [id number]  SSN: [id number]    YOB: 1955  Age: 65 y.o. Sex: female       Cardiovascular Function/Vital Signs  Visit Vitals    /67    Pulse 76    Temp 36.6 °C (97.9 °F)    Resp 18    Ht 5' 9\" (1.753 m)    Wt 115.2 kg (254 lb)    SpO2 95%    BMI 37.51 kg/m2       Patient is status post general anesthesia for Procedure(s):  RIGHT TOTAL KNEE REVISION. Nausea/Vomiting: None    Postoperative hydration reviewed and adequate. Pain:  Pain Scale 1: Numeric (0 - 10) (05/02/18 1349)  Pain Intensity 1: 0 (05/02/18 1349)   Managed    Neurological Status:   Neuro (WDL): Within Defined Limits (05/02/18 1349)   At baseline    Mental Status and Level of Consciousness: Arousable    Pulmonary Status:   O2 Device: Room air (05/02/18 1357)   Adequate oxygenation and airway patent    Complications related to anesthesia: None    Post-anesthesia assessment completed.  No concerns      Signed By: David Heck MD     May 2, 2018

## 2018-05-03 ENCOUNTER — HOME HEALTH ADMISSION (OUTPATIENT)
Dept: HOME HEALTH SERVICES | Facility: HOME HEALTH | Age: 63
End: 2018-05-03
Payer: COMMERCIAL

## 2018-05-03 VITALS
HEIGHT: 69 IN | HEART RATE: 82 BPM | TEMPERATURE: 97.4 F | OXYGEN SATURATION: 96 % | RESPIRATION RATE: 18 BRPM | WEIGHT: 254 LBS | DIASTOLIC BLOOD PRESSURE: 75 MMHG | SYSTOLIC BLOOD PRESSURE: 128 MMHG | BODY MASS INDEX: 37.62 KG/M2

## 2018-05-03 PROCEDURE — 74011250636 HC RX REV CODE- 250/636: Performed by: ORTHOPAEDIC SURGERY

## 2018-05-03 PROCEDURE — 97116 GAIT TRAINING THERAPY: CPT

## 2018-05-03 PROCEDURE — 97165 OT EVAL LOW COMPLEX 30 MIN: CPT

## 2018-05-03 PROCEDURE — 74011250637 HC RX REV CODE- 250/637: Performed by: ORTHOPAEDIC SURGERY

## 2018-05-03 PROCEDURE — 97110 THERAPEUTIC EXERCISES: CPT

## 2018-05-03 RX ORDER — OXYCODONE AND ACETAMINOPHEN 10; 325 MG/1; MG/1
1-2 TABLET ORAL
Qty: 60 TAB | Refills: 0 | Status: SHIPPED | OUTPATIENT
Start: 2018-05-03 | End: 2018-08-17 | Stop reason: SDUPTHER

## 2018-05-03 RX ORDER — CELECOXIB 200 MG/1
200 CAPSULE ORAL 2 TIMES DAILY
Qty: 60 CAP | Refills: 2 | Status: SHIPPED | OUTPATIENT
Start: 2018-05-03 | End: 2018-08-01

## 2018-05-03 RX ORDER — ASPIRIN 325 MG/1
325 TABLET, FILM COATED ORAL 2 TIMES DAILY
Qty: 60 TAB | Refills: 0 | Status: SHIPPED | OUTPATIENT
Start: 2018-05-03 | End: 2019-06-17

## 2018-05-03 RX ADMIN — LOSARTAN POTASSIUM AND HYDROCHLOROTHIAZIDE 1 TABLET: 25; 100 TABLET ORAL at 09:44

## 2018-05-03 RX ADMIN — Medication 500 MG: at 09:44

## 2018-05-03 RX ADMIN — ASPIRIN 325 MG: 325 TABLET, FILM COATED ORAL at 09:44

## 2018-05-03 RX ADMIN — PREGABALIN 50 MG: 50 CAPSULE ORAL at 09:45

## 2018-05-03 RX ADMIN — FERROUS SULFATE TAB 325 MG (65 MG ELEMENTAL FE) 325 MG: 325 (65 FE) TAB at 09:45

## 2018-05-03 RX ADMIN — ACETAMINOPHEN 1000 MG: 500 TABLET, FILM COATED ORAL at 14:13

## 2018-05-03 RX ADMIN — OXYCODONE HYDROCHLORIDE 20 MG: 10 TABLET ORAL at 03:42

## 2018-05-03 RX ADMIN — CITALOPRAM HYDROBROMIDE 20 MG: 20 TABLET ORAL at 09:45

## 2018-05-03 RX ADMIN — CELECOXIB 200 MG: 100 CAPSULE ORAL at 09:45

## 2018-05-03 RX ADMIN — ACETAMINOPHEN 1000 MG: 500 TABLET, FILM COATED ORAL at 09:45

## 2018-05-03 RX ADMIN — CEFAZOLIN SODIUM 2 G: 2 SOLUTION INTRAVENOUS at 00:52

## 2018-05-03 RX ADMIN — CEFAZOLIN SODIUM 2 G: 2 SOLUTION INTRAVENOUS at 09:44

## 2018-05-03 RX ADMIN — ONDANSETRON 4 MG: 2 INJECTION INTRAMUSCULAR; INTRAVENOUS at 08:08

## 2018-05-03 RX ADMIN — DOCUSATE SODIUM 100 MG: 100 CAPSULE, LIQUID FILLED ORAL at 09:45

## 2018-05-03 RX ADMIN — AMLODIPINE BESYLATE 10 MG: 10 TABLET ORAL at 09:45

## 2018-05-03 NOTE — PROGRESS NOTES
Rounded on patient post total knee replacement. Activity: Reinforced importance of getting OOB for all meals, going to bathroom to help prevent blood clots. VTE prophylaxis: Instructed patient to use their SCD's when not up and walking. To use while in bed and in the chair. Educated re: ankle pumps to assist with circulation when in hospital and at home. Medications: Reviewed pain medications patient is taking and the importance of keeping pain under control to help with getting OOB and therapy. Reminded the patient to always eat a snack with their pain medication to help to prevent nausea. Encouraged patient to monitor for constipation and to take a stool softner/laxative while recovering and on pain medication. Incentive Spirometry: Reinforced use of incentive spirometer with return demonstration by patient. Wound Care: Dressing to surgical site covered with dry ace wrap and ice plus brace in place. Patient instructed not to take dressing off at home. Patient given CHG wash to use in hospital and at home. Stressed importance of using a clean towel and washcloth daily. Reminded to put on clean clothes and night clothes daily. Ice Protocol: Ice man machine in place per protocol. Patient Safety: Call light in reach. Patient  reminded to call for help toget OOB or when leaving bathroom for safety. Phone and other items also within reach per patient's request.     Diet: Educated patient on the importance of eating three well balanced meals a day with protein to promote bone/muscle healing. Reminded patient to drink lots of fluids to protect kidneys from all the medications being taken currently with recovery. Patient given educational material to remind them to continue doing everything at home to prevent complications and have a successful recovery. Patient  verbalized understand of all information and education discussed. Patient  given the opportunity for asking questions.       Patient c/o Nausea so Jose Roberto Manjarrez notified. Patient given ginger ale to drink with crackers.

## 2018-05-03 NOTE — OP NOTES
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 OPERATIVE PROCEDURE:  Revision right knee with a size 2.5 x 12.5 TC3 RP insert. COMPLICATIONS:  No complication. SPECIMENS REMOVED:  No specimen. BLOOD LOSS:  50 mL. SURGEON:  Sondra Flores MD.    FIRST ASSISTANT:  Abelino Alvarez. SECOND ASSISTANT:  Ariana Brar. ANESTHESIA:  Dr. Sara Luis, preoperative femoral nerve block, light general.    IMPLANTS:  As above-mentioned. HISTORY OF PRESENT ILLNESS:  The patient is a very nice lady who underwent primary knee joint replacement by an associate in town approximately a year and a half ago and she ended up developing aseptic loosening involving her femoral component, negative workup for infection, and she was having startup discomfort and wanted her knee revised. No problems on the tibial side and her stem had failed and the stem had migrated anteromedially. Plan was to do a femoral component revision. We had full DePuy revision as well as other manufacture's   available. Had a hinge as a backup as well. With this in mind, all risks and benefits described. The patient and family elect to proceed. Antibiotics confirmed given and previous skin incision marked. Standard prep and drape and a timeout performed. Midline incision and we kept undermining to a minimum, freed up the extensor mechanism, arthrotomy performed, went straight to a lateral release. There was a lot of overgrowth involving the patellar facet and we excised about a 1 x 4 cm heterotopic ossification associated with the lateral facet of the patella. We also did a lateral facetectomy and freed up the patella.   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 This was debrided as well. The tibia appeared to be well aligned and well fixed and the femoral component had some micromotion that was obvious with it. The neurovascular bundle was protected at all times. We cleaned up the gutters, performed a mild quadriceps plasty as well and got excellent exposure for the knee. I then took a chisel and paddled the post and extracted the metal post and then took a saw just to cut the post and remove the actual bearing itself cleanly while protecting the tibial component. I then went with a saw, rigid saw, followed by straight osteotomes to very patiently free up the interface of the bone cement interface on both medial and lateral sides of the femur and went around a second time just to confirm that we were nicely freed up. We then used the duckbill to try to extract the femur and the proximal tip of the implant had gone medially and therefore we attempted to correct the alignment to extract it and unfortunately the femoral component could not be removed. There was an obvious collection of bone at the distal part of the implant. The stem would toggle but could not be removed and I checked again the interface between the femoral component and the bone distally, that was nicely freed up. The pedestal bone as also seen on the lateral x-ray was unfortunately large enough to prevent removal of the femoral stem without an osteotomy. I felt at this point in time that I was not prepared to do an osteotomy and I did not want to have a fractured femur in many pieces. We, therefore, elected to cement the femur back on and place a new bearing. The tourniquet was let down and the new bearing inserted. The leg was held in full extension. The cement was fully cured. Further cement removal.  We liberally irrigated out. Used the Aquamantys and Exparel cocktail and a routine closure. We will manage her in a hinged knee brace just for extra stability for the time being. I will be speaking with [facility] tomorrow and will arrange in the next few weeks for her to go for a more formal revision and likely osteotomy of the femur to get the femoral component out. Patient was awoken and taken to recovery room in stable condition. Shadi Harkins was the primary assistant today and assisted with patient setup, positioning, prep, drape, retracting, also positioning of the leg during the surgery as well as closure and also dressing application and transfer of the patient to the stretcher. No complications associated with the procedure. The patient tolerated the procedure well.       Gordon Best MD AM / EZYAD  D: 05/02/2018 13:29     T: 05/02/2018 22:37  JOB #: [id number]

## 2018-05-03 NOTE — DISCHARGE SUMMARY
5/2/2018  8:35 AM    5/3/2018, 8:30 AM    Primary Dx:right Orthopedic / Rheumatologic: Total Knee Replacement, revision  Secondary Dx: Etiological Diagnoses: none    HPI:  Pt had a previous TKA done and was found to have a loose femoral component. She was worked up for infection and fortunately negative. Due to the current findings and affected activity of daily living surgical intervention is indicated.   The alternatives, risks, complications as well as expected outcome were discussed, the patient understands and wishes to proceed with surgery    Past Medical History:   Diagnosis Date    Arthritis     Elevated cholesterol     Hypertension     Joint pain     knees         Current Facility-Administered Medications:     WARFARIN INFORMATION NOTE (COUMADIN), , Other, Q24H, Pacheco Hughes MD    amLODIPine (NORVASC) tablet 10 mg, 10 mg, Oral, DAILY, Pacheco Hughes MD    citalopram (CELEXA) tablet 20 mg, 20 mg, Oral, DAILY, Pacheco Hughes MD    losartan-hydroCHLOROthiazide (HYZAAR) 100-25 mg per tablet 1 Tab, 1 Tab, Oral, DAILY, Pacheco Hughes MD    magnesium oxide tablet 500 mg, 500 mg, Oral, DAILY, Pacheco Hughes MD    0.9% sodium chloride infusion, 100 mL/hr, IntraVENous, CONTINUOUS, Pacheco Hughes MD, Last Rate: 100 mL/hr at 05/02/18 1659, 100 mL/hr at 05/02/18 1659    sodium chloride (NS) flush 5-10 mL, 5-10 mL, IntraVENous, Q8H, Pacheco Hughes MD, Stopped at 05/03/18 0600    sodium chloride (NS) flush 5-10 mL, 5-10 mL, IntraVENous, PRN, Pacheco Huhges MD    naloxone [facility]) injection 0.4 mg, 0.4 mg, IntraVENous, PRN, Pacheco Hughes MD    ferrous sulfate tablet 325 mg, 1 Tab, Oral, BID WITH MEALS, Pacheco Hughes MD, 325 mg at 05/02/18 1650    diphenhydrAMINE (BENADRYL) injection 12.5 mg, 12.5 mg, IntraVENous, Q6H PRN, Pacheco Hughes MD    zolpidem (AMBIEN) tablet 5 mg, 5 mg, Oral, QHS PRN, Pacheco Hughes MD    acetaminophen (TYLENOL) tablet 1,000 mg, 1,000 mg, Oral, QID, Sena Mems MD Dutch, 1,000 mg at 05/02/18 2131    oxyCODONE IR (ROXICODONE) tablet 10-20 mg, 10-20 mg, Oral, Q3H PRN, Aisha Ayala MD, 20 mg at 05/03/18 0342    ceFAZolin (ANCEF) 2g IVPB in 50 mL D5W, 2 g, IntraVENous, Q8H, Aisha Ayala MD, Last Rate: 100 mL/hr at 05/03/18 0052, 2 g at 05/03/18 0052    ondansetron (ZOFRAN) injection 4 mg, 4 mg, IntraVENous, Q4H PRN, Aisha Ayala MD, 4 mg at 05/03/18 0808    docusate sodium (COLACE) capsule 100 mg, 100 mg, Oral, BID, Aisha Ayala MD, 100 mg at 05/02/18 1835    celecoxib (CELEBREX) capsule 200 mg, 200 mg, Oral, BID, Aisha Ayala MD, 200 mg at 05/02/18 1835    pregabalin (LYRICA) capsule 50 mg, 50 mg, Oral, BID, Aisha Ayala MD, 50 mg at 05/02/18 1602    buffered aspirin (BUFFERIN) tablet 325 mg, 325 mg, Oral, BID, Aisha Ayala MD      Review of patient's allergies indicates no known allergies. Physical Exam:  General A&O x3 NAD, well developed, well nourished, normal affect  Heart: S1-S2, RRR  Lungs: CTA Bilat  Abd: soft NT, ND  Ext: n/v intact    Hospital Course:    Pt. Had rightOrthopedic / Rheumatologic: Total Knee Replacement, revision    Post -op Course: The patient tolerated the procedure well. They were followed by internal medicine for help with medical management. Pt. Was place on Abx pre and post-op for prophylaxis against infection as well as coumadin pre and post-op for prophylaxis against DVT. Vitals signs remained stable, remained af. The wound wasclean, dry, no drainage. Pain was well controlled. Pt. Had negative calf tenderness or swelling, no evidence for DVT. Patient had PT/OT consult for evaluation and treatment.     CBC  Lab Results   Component Value Date/Time    WBC 8.3 03/01/2018 11:30 AM    RBC 4.52 03/01/2018 11:30 AM    HCT 36.5 03/01/2018 11:30 AM    MCV 80.8 03/01/2018 11:30 AM    MCH 27.4 03/01/2018 11:30 AM    MCHC 34.0 03/01/2018 11:30 AM    RDW 13.3 03/01/2018 11:30 AM     Coagulation  Lab Results Component Value Date    INR 1.9 (H) 07/06/2016    APTT 25.8 06/01/2016      Basic Metabolic Profile  Lab Results   Component Value Date     11/11/2017    CO2 26 11/11/2017    BUN 8 11/11/2017       Discharge Plan:  The patient will be d/c'd to home, total knee protocol, WBAT, hinged brace on for ambulation. [facility]. Total joint protocol. Pt safe for homebound transfer, sp Total joint replacement. A walker, bedside commode, and shower chair will be utilized for ADL's. Follow up with Dr. Malika Hong in 10-12 days. Call with any questions or concerns.

## 2018-05-03 NOTE — PROGRESS NOTES
Ortho    Pt. Seen and evaluated. Doing well, pain well controlled, progressing well with PT  Denies cp, sob, abd pain    Blood pressure 156/82, pulse 80, temperature 97.4 °F (36.3 °C), resp. rate 18, height 5' 9\" (1.753 m), weight 254 lb (115.2 kg), SpO2 97 %. rightKnee woundclean, dry, no drainage  Sensory intact to LT  Motor intact  nv intact  Neg calf tenderness    Labs:  CBC  @  CBC:   Lab Results   Component Value Date/Time    WBC 8.3 03/01/2018 11:30 AM    RBC 4.52 03/01/2018 11:30 AM    HGB 12.4 03/01/2018 11:30 AM    HCT 36.5 03/01/2018 11:30 AM    PLATELET 557 90/53/9872 11:30 AM     BMP:   Lab Results   Component Value Date/Time    Glucose 122 (H) 11/11/2017 11:10 AM    Sodium 140 11/11/2017 11:10 AM    Potassium 4.1 11/11/2017 11:10 AM    Chloride 97 11/11/2017 11:10 AM    CO2 26 11/11/2017 11:10 AM    BUN 8 11/11/2017 11:10 AM    Creatinine 0.61 11/11/2017 11:10 AM    Calcium 10.0 11/11/2017 11:10 AM   @  Coagulation  Lab Results   Component Value Date    INR 1.9 (H) 07/06/2016    APTT 25.8 06/01/2016      Basic Metabolic Profile  Lab Results   Component Value Date     11/11/2017    CO2 26 11/11/2017    BUN 8 11/11/2017       Assesment: rightOrthopedic / Rheumatologic: Total Knee Replacement  Past Medical History:   Diagnosis Date    Arthritis     Elevated cholesterol     Hypertension     Joint pain     knees     ASA: 3    Status post joint replacement pt with risk of bleeding, blood clots, and infection.     Plan: aspirin, PT, home today if safe with PT

## 2018-05-03 NOTE — PROGRESS NOTES
Pt has met all PT goals at this time. She is safe with bed mobility, transfers, and stairs supervision/modified independent. Pt ambulated >500 feet with RW. Pt is ready for d/c home (nurse Wilda Knife informed). Full note to follow.    Shannan Raymond, PT, DPT

## 2018-05-03 NOTE — PROGRESS NOTES
Problem: Self Care Deficits Care Plan (Adult)  Goal: *Acute Goals and Plan of Care (Insert Text)  Outcome: Resolved/Met Date Met: 05/03/18  Occupational Therapy EVALUATION/discharge    Patient: Korin Ayala (83 y.o. female)  Date: 5/3/2018  Primary Diagnosis: T84.012A FAILED RT KNEE REPLACEMENT  Failed total knee replacement (HCC)  Procedure(s) (LRB):  RIGHT TOTAL KNEE REVISION (Right) 1 Day Post-Op   Precautions:   Fall, WBAT    ASSESSMENT AND RECOMMENDATIONS:  MARIE hickman'thompson pt for OT session. OT eval completed POD #1 s/p re do R  TKA. Educated in adaptive tech with reacher for increased safety and independence in ADL and related transfer including use of immobilizer (maintained donned throughout session). Following instruction pt completed ADL and related transfer at supervision level-MOD (I) level. Pt verbalized understanding of home safety recommendations and shower transfer tech. Pt to have assist of family PRN at discharge. Distributed: Reacher     Skilled occupational therapy is not indicated at this time. Discharge Recommendations: None  Further Equipment Recommendations for Discharge: N/A      Barriers to Learning/Limitations: None  Compensate with: visual, verbal, tactile, kinesthetic cues/model     COMPLEXITY     Eval Complexity: History: LOW Complexity : Brief history review ; Examination: LOW Complexity : 1-3 performance deficits relating to physical, cognitive , or psychosocial skils that result in activity limitations and / or participation restrictions ; Decision Making:LOW Complexity : No comorbidities that affect functional and no verbal or physical assistance needed to complete eval tasks  Assessment: low Complexity        G-CODES:     Self Care  Current  CI= 1-19%   Goal  CI= 1-19%   D/C  CI= 1-19%. The severity rating is based on the Level of Assistance required for Functional Mobility and ADLs. SUBJECTIVE:   Patient stated I have a shower chair.     OBJECTIVE DATA SUMMARY:     Past Medical History:   Diagnosis Date    Arthritis     Elevated cholesterol     Hypertension     Joint pain     knees     Past Surgical History:   Procedure Laterality Date    HX HYSTERECTOMY      HX KNEE REPLACEMENT      HX ORTHOPAEDIC Right Feb, 2016    TKR    HX TONSILLECTOMY       Prior Level of Function/Home Situation: independent  Home Situation  Home Environment: Private residence  # Steps to Enter: 0  One/Two Story Residence: One story  Living Alone: No  Support Systems: Family member(s)  Patient Expects to be Discharged to[de-identified] Private residence  Current DME Used/Available at Home: Walker, rolling  Tub or Shower Type: Tub/Shower combination  []     Right hand dominant   []     Left hand dominant  Cognitive/Behavioral Status:  Neurologic State: Alert  Orientation Level: Oriented X4          Skin: No noted concern    Edema: no noted cocnern    Vision/Perceptual:    Tracking:  (no noted concern)                                Coordination:  Coordination: Within functional limits (BUE)            Balance:  Sitting: Intact  Standing: Intact; With support  Standing - Static: Good  Standing - Dynamic : Good    Strength:    Strength:  Within functional limits (BUE)                Tone & Sensation:    Tone: Normal (BUE)                         Range of Motion:    AROM: Within functional limits (BUE)                         Functional Mobility and Transfers for ADLs:  Bed Mobility:     Supine to Sit:  (not observed, in chair at arrival)        Transfers:  Sit to Stand: Modified independent                Toilet Transfer : Modified independent                ADL Assessment:  Feeding: Modified independent    Oral Facial Hygiene/Grooming: Modified Independent         Upper Body Dressing: Modified independent    Lower Body Dressing: Modified independent    Toileting: Modified independent               Pain:  Pt reports 0/10 pain or discomfort prior to treatment.    Pt reports 0/10 pain or discomfort post treatment. Activity Tolerance:   good    Please refer to the flowsheet for vital signs taken during this treatment. After treatment:   [x]  Patient left in no apparent distress sitting up in chair  []  Patient left in no apparent distress in bed  [x]  Call bell left within reach  []  Nursing notified  [x]  Caregiver present PT PRESENT AT EXIT  []  Bed alarm activated    COMMUNICATION/EDUCATION:   Communication/Collaboration:  [x]      Home safety education was provided and the patient/caregiver indicated understanding. []      Patient/family have participated as able and agree with findings and recommendations. []      Patient is unable to participate in plan of care at this time.     Hemal Menjivar, OT  Time Calculation: 16 mins

## 2018-05-03 NOTE — PROGRESS NOTES
Mobility Intervention:       [] Pt dangled at edge of bed    [] Pt assisted OOB to bedside commode    [] Pt assisted OOB to chair    [x] Pt ambulated to bathroom    [] Patient was ambulated in room/hallway    Assistive Device Utilized:       [x] Rolling walker   [] Crutches   [] Straight Cane   [] Knee immobilizer   [] IV pole    After Mobilization:     [x] Patient left in no apparent distress sitting up in chair  [] Patient left in no apparent distress in bed  [x] Call bell left within reach  [x] SCDs on & machine turned on  [x] Ice applied  [] RN notified  [] Caregiver present  [] Bed alarm activated    Reason patient not mobilized:      [] Patient refused   [] Nausea/vomiting   [] Low blood pressure   [] Drowsy/lethargic    Pain Rating:     [x] 0  [] 1  Assistive Device:        [] 2  [] 3  [] 4  [] 5  [] 6  Assistive Device:        [] 7  [] 8  [] 9  [] 10    Comments:

## 2018-05-03 NOTE — PROGRESS NOTES
Problem: Mobility Impaired (Adult and Pediatric)  Goal: *Acute Goals and Plan of Care (Insert Text)  Physical Therapy Goals  Initiated 5/2/2018 and to be accomplished within 7 day(s)  1. Patient will move from supine to sit and sit to supine , scoot up and down and roll side to side in bed with supervision/set-up. 2.  Patient will transfer from bed to chair and chair to bed with supervision/set-up using the least restrictive device. 3.  Patient will perform sit to stand with supervision/set-up. 4.  Patient will ambulate with supervision/set-up for 400 feet with the least restrictive device. Outcome: Resolved/Met Date Met: 05/03/18  physical Therapy TREATMENT/DISCHARGE    Patient: Isabela Arellano (60 y.o. female)  Date: 5/3/2018  Diagnosis: T84.012A FAILED RT KNEE REPLACEMENT  Failed total knee replacement ([facility] [address] <principal problem not specified>  Procedure(s) (LRB):  RIGHT TOTAL KNEE REVISION (Right) 1 Day Post-Op  Precautions: Fall, WBAT (hinged knee brace at all times)  Chart, physical therapy assessment, plan of care and goals were reviewed. ASSESSMENT:  Patient is cleared by nursing for PT, and patient consents to therapy. Pt has met all PT goals at this time. Pt was supervision with bed mobility yesterday and states no issues with bed mobility today. Pt performed transfers modified independent from toilet and from chair. Pt ambulated >500 feet RW supervision/modified independent. Practiced stairs 4 steps B railings supervision/modified independent. Educated and performed therex. Educated on donning/doffing knee brace. Educated on OOB 3-5 times a day and nursing assistance in hospital. Pt ended therapy sitting up in recliner with all needs met. Pt is safe for d/c home from PT standpoint with nursing Ada Rosebush) informed.    Progression toward goals:  [x]      Goals met  []      Improving appropriately and progressing toward goals  []      Improving slowly and progressing toward goals  []      Not making progress toward goals and plan of care will be adjusted     PLAN:  Patient will be discharged from physical therapy at this time. Rationale for discharge:  [x] Goals Achieved  [] [address]  [] Patient not participating in therapy  [] Other:  Discharge Recommendations:  Home Health  Further Equipment Recommendations for Discharge:  rolling walker     SUBJECTIVE:   Patient stated I feel great.     OBJECTIVE DATA SUMMARY:   Critical Behavior:  Neurologic State: Alert  Orientation Level: Oriented X4  Cognition: Follows commands  Safety/Judgement: Awareness of environment, Fall prevention  Functional Mobility Training:  Bed Mobility:  Supine to Sit:  (supervision yesterday, pt reports no issues today)  Transfers:  Sit to Stand: Modified independent  Stand to Sit: Modified independent  Balance:  Sitting: Intact  Standing: Intact; With support  Standing - Static: Good  Standing - Dynamic : Good  Ambulation/Gait Training:  Distance (ft): 500 Feet (ft)  Assistive Device: Walker, rolling  Ambulation - Level of Assistance: Supervision;Modified independent  Gait Abnormalities: Antalgic  Right Side Weight Bearing: As tolerated  Stairs:  Number of Stairs Trained: 4  Stairs - Level of Assistance: Supervision;Modified independent   Rail Use: Both  Therapeutic Exercises: Ankle pumps, heel slides, quad sets, and SLR x 10 reps. Pain:  Pre: 0/10 knee  Post: 0/10 knee  Activity Tolerance:   good  Please refer to the flowsheet for vital signs taken during this treatment. After treatment:   [x] Patient left in no apparent distress sitting up in chair  [] Patient left in no apparent distress in bed  [x] Call bell left within reach  [x] Nursing notified  [] Caregiver present  [] Bed alarm activated  [x] Personal items in reach    Mukesh December, PT, DPT   Time Calculation: 23 mins    Mobility  Current  CI= 1-19%   Goal  CI= 1-19%  D/C  CI= 1-19%.   The severity rating is based on the Level of Assistance required for Functional Mobility and ADLs.

## 2018-05-03 NOTE — DISCHARGE INSTRUCTIONS
DISCHARGE SUMMARY from Nurse    The following personal items collected during your admission are returned to you:   Dental Appliance: Dental Appliances: None  Vision: Visual Aid: Glasses  Hearing Aid:    Jewelry: Jewelry: None  Clothing: Clothing: Pants, Shirt, Socks, Footwear, Undergarments  Other Valuables: Other Valuables: None  Valuables sent to safe:              PATIENT INSTRUCTIONS:    After general anesthesia or intravenous sedation, for 24 hours or while taking prescription Narcotics:  · Limit your activities  · Do not drive and operate hazardous machinery  · Do not make important personal or business decisions  · Do  not drink alcoholic beverages  · If you have not urinated within 8 hours after discharge, please contact your surgeon on call. Report the following to your surgeon:  · Excessive pain, swelling, redness or odor of or around the surgical area  · Temperature over 100.5  · Nausea and vomiting lasting longer than 4 hours or if unable to take medications  · Any signs of decreased circulation or nerve impairment to extremity: change in color, persistent  numbness, tingling, coldness or increase pain  · Any questions      Follow-up Appointments   Procedures    FOLLOW UP VISIT Appointment in: Two Weeks     Standing Status:   Standing     Number of Occurrences:   1     Order Specific Question:   Appointment in     Answer: Two Weeks       What to do at Home:  Recommended activity: hinged knee brace on right leg at all times. *  Please give a list of your current medications to your Primary Care Provider. *  Please update this list whenever your medications are discontinued, doses are      changed, or new medications (including over-the-counter products) are added. *  Please carry medication information at all times in case of emergency situations.           These are general instructions for a healthy lifestyle:    No smoking/ No tobacco products/ Avoid exposure to second hand smoke    Surgeon General's Warning:  Quitting smoking now greatly reduces serious risk to your health. Obesity, smoking, and sedentary lifestyle greatly increases your risk for illness    A healthy diet, regular physical exercise & weight monitoring are important for maintaining a healthy lifestyle    You may be retaining fluid if you have a history of heart failure or if you experience any of the following symptoms:  Weight gain of 3 pounds or more overnight or 5 pounds in a week, increased swelling in our hands or feet or shortness of breath while lying flat in bed. Please call your doctor as soon as you notice any of these symptoms; do not wait until your next office visit. Recognize signs and symptoms of STROKE:    F-face looks uneven    A-arms unable to move or move unevenly    S-speech slurred or non-existent    T-time-call 911 as soon as signs and symptoms begin-DO NOT go       Back to bed or wait to see if you get better-TIME IS BRAIN. The discharge information has been reviewed with the patient. The patient verbalized understanding. [employer] Activation    Thank you for requesting access to [employer]. Please follow the instructions below to securely access and download your online medical record. [employer] allows you to send messages to your doctor, view your test results, renew your prescriptions, schedule appointments, and more. How Do I Sign Up? 1. In your internet browser, go to [email or web address]. 2. Click on the First Time User? Click Here link in the Sign In box. You will see the New Member Sign Up page. 3. Enter your [employer] Access Code exactly as it appears below. You will not need to use this code after youve completed the sign-up process. If you do not sign up before the expiration date, you must request a new code. [employer] Access Code: [email or web address]  Expires: 2012  9:42 AM (This is the date your [employer] access code will )    4.  Enter the last four digits of your Social Security Number (xxxx) and Date of Birth (mm/dd/yyyy) as indicated and click Submit. You will be taken to the next sign-up page. 5. Create a Lulu ID. This will be your Lulu login ID and cannot be changed, so think of one that is secure and easy to remember. 6. Create a Lulu password. You can change your password at any time. 7. Enter your Password Reset Question and Answer. This can be used at a later time if you forget your password. 8. Enter your e-mail address. You will receive e-mail notification when new information is available in [address] 9. Click Sign Up. You can now view and download portions of your medical record. 10. Click the Download Summary menu link to download a portable copy of your medical information. Additional Information    If you have questions, please visit the Frequently Asked Questions section of the Lulu website at [email or web address] Remember, Lulu is NOT to be used for urgent needs. For medical emergencies, dial 911. Armband removed and shredded    Discharge Instructions for Total Knee Replacement Patients    · The dressing on your knee will be changed by the Home Health professional at the appropriate time. Keep your incision clean and dry. Do not apply any ointments to the incision. · You may shower as long as you keep you incision dry. When showering, leave your dressing on. The dressing is waterproof as long as the edges are sealed. · Notify your surgeon if:  · Your temperature is greater than 100.5  · You have pain not controlled by your pain medication  · You have increased drainage from your incision  · You have increased redness or swelling in your leg  · You have chest pain, shortness of breath, or any other problems    · Do your exercises as instructed by the home physical therapist.    · Bend and straighten your operative leg every hour.  Walk once an hour during normal walking hours.    · During periods of inactivity or rest, your leg should be elevated with a rolled towel or folded pillow under the heel to keep the knee straight. · Do not place anything under the knee. · Do not sit in a recliner chair with the footrest elevated. · You may use ice to your knee for 20-30 minutes after exercise and as needed. Do not apply the ice pack directly to your skin. Use a barrier such as your pant leg or a thin towel. · If you have KINGSLEY hose (the white support stockings), remove them at bedtime and re-apply the hose in the morning for the next 2 weeks. Best of luck with your new knee and [address] YOU for choosing the [address]!

## 2018-05-03 NOTE — HOME CARE
Rec HC order - d/c noted for today - pt going home with her sister, Antonio العراقي ([phone]) to address [place] = has needed dme at home - [facility] will follow for SN/PT - [place] knee protocol - D Francisco SALAZAR

## 2018-05-03 NOTE — PROGRESS NOTES
Hemovac drain dc'd from right knee and 4x4 gauze applied over old drain site. Aquacel Ag dressing intact to knee incision with scant amount serosanguinous drainage noted. Hinged knee brace intact to right leg. Ice pack reapplied to knee. Tolerated well by patient.

## 2018-05-03 NOTE — CONSULTS
[facility]. Pulmonary, Critical Care, and Sleep Medicine    Initial Patient Consult    Name: Mekhi Singleton MRN: [id number]   : 1955 Hospital: [facility]   Date: 5/3/2018        IMPRESSION:   · Postop Right knee replacement- post op Day #1. Doing well  · HTN on hyzaar and Norvasc at home  · Obesity BMI 35-39  · Gout  · Depression     Patient Active Problem List   Diagnosis Code    Arthritis M19.90    Knee pain M25.569    Mild chronic anemia D64.9    Impaired glucose tolerance R73.02    Essential hypertension I10    Obesity (BMI 30-39. 9) E66.9    Hyperlipidemia E78.5    Osteoarthritis, knee M17.10    Knee osteoarthritis M17.10    Severe obesity (BMI 35.0-39. 9) with comorbidity ([address] E66.01    Failed total knee replacement ([facility] [address] T84.018A, Z96.659        RECOMMENDATIONS:   · Continue Post op progression of care  · Will monitor BP, renal function, H/H  · Will hold ACE, ARB, Diuretics x 24 hrs and resume 1/2 dose when appropriate criteria met  · Monitor I and O.   · Fluids- 24 hrs and progress to incremental PO intake  · Bronchial hygiene protocol  · Antibiotics- SCIP  · Aspiration precautions  · Bowel prep  · Pain control per ortho  · OT, PT, OOB and ambulate  · Will Follow for medical management  · DVT, PUD prophylaxis  · Discussed with patient. Subjective: This patient has been seen and evaluated at the request of Dr. Sophie Edwards for Postop Medical management. Patient is a 65 y.o. female  With h/o HTN, Depression- situational and Gout admitted for Right total knee replacement. Patient underwent procedure under general anesthesia with FNB. Tolerated well without any complications. Seen postop. No c/o SOB, Cough, chest pain  Denies nausea, vomiting  Denies dizziness, palpitations  Denies any rashes, itching. No other complaints offered. Reviewed medical records and available data.     Past Medical History:   Diagnosis Date    Arthritis     Elevated cholesterol     Hypertension     Joint pain     knees      Past Surgical History:   Procedure Laterality Date    HX HYSTERECTOMY      HX KNEE REPLACEMENT      HX ORTHOPAEDIC Right Feb, 2016    TKR    HX TONSILLECTOMY        Prior to Admission medications    Medication Sig Start Date End Date Taking? Authorizing Provider   buffered aspirin (BUFFERIN) 325 mg tablet Take 1 Tab by mouth two (2) times a day. 5/3/18  Yes Radha Gaston PA-C   celecoxib (CELEBREX) 200 mg capsule Take 1 Cap by mouth two (2) times a day for 90 days. 5/3/18 8/1/18 Yes Radha Gaston PA-C   oxyCODONE-acetaminophen (PERCOCET)  mg per tablet Take 1-2 Tabs by mouth every four (4) hours as needed for Pain. Max Daily Amount: 12 Tabs. 5/3/18  Yes Radha Gaston PA-C   citalopram (CELEXA) 20 mg tablet TAKE 1 TAB BY MOUTH DAILY. 3/5/18  Yes Historical Provider   amLODIPine (NORVASC) 10 mg tablet TAKE 1 TABLET BY MOUTH DAILY 3/19/18  Yes Karlos Otto NP   atorvastatin (LIPITOR) 20 mg tablet TAKE 1 TABLET BY MOUTH DAILY 3/19/18  Yes Karlos Otto NP   losartan-hydroCHLOROthiazide (HYZAAR) 100-25 mg per tablet TAKE 1 TABLET BY MOUTH DAILY. 3/19/18  Yes Karlos Otto NP   ferrous sulfate 325 mg (65 mg iron) tablet TAKE 1 TABLET BY MOUTH TWICE A DAY WITH MEALS 11/13/17  Yes Karlos Otto NP   fexofenadine (ALLEGRA) 180 mg tablet Take 1 Tab by mouth daily. 11/13/17  Yes Karlos Otto NP   tiZANidine (ZANAFLEX) 4 mg tablet TAKE 1 TAB BY MOUTH THREE (3) TIMES DAILY AS NEEDED. 9/13/17  Yes Alie Espino MD   Comp. Stocking,Knee,Regular,Lrg misc Use daily and take off at night, 20mmHg 7/14/17  Yes Alie Espino MD   polyethylene glycol (MIRALAX) 17 gram/dose powder Take as directed by office  Patient taking differently: as needed. Take as directed by office 6/15/16  Yes Norman Gaston MD   MULTIVIT-MIN/FA/CALCIUM/VIT K1 (ONE-A-DAY WOMEN'S 50+ PO) Take  by mouth daily.    Yes Historical Provider   Alexandria Fletcher fruit 250 mg cap Take 1 Cap by mouth daily. Yes Historical Provider   Magnesium Oxide 500 mg cap Take  by mouth daily. Yes Historical Provider   allopurinol (ZYLOPRIM) 100 mg tablet Take 1 Tab by mouth two (2) times a day. 3/2/18   Margarita Ty MD     No Known Allergies   Social History   Substance Use Topics    Smoking status: Never Smoker    Smokeless tobacco: Never Used    Alcohol use No      Family History   Problem Relation Age of Onset    Diabetes Mother     Colon Polyps Mother     Dementia Mother     Heart Disease Father     Hypertension Brother         Current Facility-Administered Medications   Medication Dose Route Frequency    WARFARIN INFORMATION NOTE (COUMADIN)   Other Q24H    amLODIPine (NORVASC) tablet 10 mg  10 mg Oral DAILY    citalopram (CELEXA) tablet 20 mg  20 mg Oral DAILY    losartan-hydroCHLOROthiazide (HYZAAR) 100-25 mg per tablet 1 Tab  1 Tab Oral DAILY    magnesium oxide tablet 500 mg  500 mg Oral DAILY    0.9% sodium chloride infusion  100 mL/hr IntraVENous CONTINUOUS    sodium chloride (NS) flush 5-10 mL  5-10 mL IntraVENous Q8H    ferrous sulfate tablet 325 mg  1 Tab Oral BID WITH MEALS    acetaminophen (TYLENOL) tablet 1,000 mg  1,000 mg Oral QID    docusate sodium (COLACE) capsule 100 mg  100 mg Oral BID    celecoxib (CELEBREX) capsule 200 mg  200 mg Oral BID    pregabalin (LYRICA) capsule 50 mg  50 mg Oral BID    buffered aspirin (BUFFERIN) tablet 325 mg  325 mg Oral BID       Review of Systems:  Pertinent items are noted in HPI.   ROS    Objective:   Vital Signs:    Visit Vitals    /75 (BP 1 Location: Left arm, BP Patient Position: Sitting)    Pulse 82    Temp 97.4 °F (36.3 °C)    Resp 18    Ht 5' 9\" (1.753 m)    Wt 115.2 kg (254 lb)    SpO2 96%    BMI 37.51 kg/m2       O2 Device: Room air       Temp (24hrs), Av.4 °F (36.3 °C), Min:96.5 °F (35.8 °C), Max:98 °F (36.7 °C)       Intake/Output:   Last shift:       07 -  1900  In: - Out: 400 [Urine:400]  Last 3 shifts: 05/01 1901 - 05/03 0700  In: 788.3 [P.O.:330; I.V.:458.3]  Out: 5211 [Urine:1700; Drains:130]    Intake/Output Summary (Last 24 hours) at 05/03/18 1116  Last data filed at 05/03/18 0706   Gross per 24 hour   Intake           788.33 ml   Output             2230 ml   Net         -1441.67 ml        Physical Exam:   General:  Alert, cooperative, no distress, appears stated age. Head:  Normocephalic, without obvious abnormality, atraumatic. Eyes:  Conjunctivae/corneas clear. ANicteric   Nose: Nares normal. Mucosa normal. No drainage or sinus tenderness. Throat: Lips, mucosa dry. NO thrush;    Neck: Supple, symmetrical, trachea midline, no adenopathy, thyroid: no enlargment/tenderness/nodules, no carotid bruit and no JVD. No crepitus   Back:   Symmetric, no curvature, no spine tenderness or flank pain   Lungs:   Bilateral auscultation - clear to auscultaion   Chest wall:  No tenderness or deformity. Heart:  Regular rate and rhythm, S1, S2 normal, no murmur, click, rub or gallop. Abdomen:   Soft, non-tender. Bowel sounds normal. No masses,  No organomegaly. No paradox   Extremities: normal, atraumatic, no cyanosis or edema. Surgical site- intact   Pulses: 1-2+ and symmetric all extremities. Skin: Skin color, texture, turgor normal. No rashes or lesions   Lymph nodes: Cervical, supraclavicular, and axillary nodes normal.   Neurologic: Grossly nonfocal          Data review:   Labs:  No results found for this or any previous visit (from the past 24 hour(s)).     PFT Results  (Last 48 hours)    None        Echo Results  (Last 48 hours)    None        Imaging:  I have personally reviewed the patients radiographs and have reviewed the reports:  CXR Results  (Last 48 hours)    None        CT Results  (Last 48 hours)    None            Moderate  complexity decision making was performed during the evaluation of this patient at high risk for decompensation with multiple organ involvement     Above mentioned total time spent on reviewing the case/medical record/data/notes/EMR/patient examination/documentation/coordinating care with nurse/consultants, exclusive of procedures with complex decision making performed and > 50% time spent in face to face evaluation.      Lilly Oliver MD

## 2018-05-03 NOTE — PROGRESS NOTES
Reason for Admission:  Failed rt knee replacement                    RRAT Score:    6                 Plan for utilizing home health:   Ordered                       Likelihood of Readmission:  Low score/green zone                         Transition of Care Plan:    Home with [facility] Management Interventions  Transition of Care Consult (CM Consult): Home Health ([facility])  [address]: Yes  Physical Therapy Consult: Yes  Occupational Therapy Consult: Yes  Current Support Network: [place], Family Lives Nearby  Confirm Follow Up Transport: Family  Plan discussed with Pt/Family/Caregiver: Yes  Freedom of Choice Offered: Yes  Discharge Location  Discharge Placement: Home with home health    Orders to arrange for [facility], [address] given to patient and selected [address] [place] [address], referral sent via [employer] and called to [employer] and they will follow patient. Patient states that she has no DME needs at this time and verbalized no further needs, stated that she will be staying with her sister after discharge.

## 2018-05-04 ENCOUNTER — HOME CARE VISIT (OUTPATIENT)
Dept: SCHEDULING | Facility: HOME HEALTH | Age: 63
End: 2018-05-04
Payer: COMMERCIAL

## 2018-05-04 VITALS
DIASTOLIC BLOOD PRESSURE: 70 MMHG | TEMPERATURE: 97.8 F | HEART RATE: 79 BPM | RESPIRATION RATE: 17 BRPM | SYSTOLIC BLOOD PRESSURE: 110 MMHG

## 2018-05-04 DIAGNOSIS — T84.012D FAILED TOTAL RIGHT KNEE REPLACEMENT, SUBSEQUENT ENCOUNTER: Primary | ICD-10-CM

## 2018-05-04 PROCEDURE — G0151 HHCP-SERV OF PT,EA 15 MIN: HCPCS

## 2018-05-04 PROCEDURE — 400013 HH SOC

## 2018-05-05 ENCOUNTER — HOME CARE VISIT (OUTPATIENT)
Dept: SCHEDULING | Facility: HOME HEALTH | Age: 63
End: 2018-05-05
Payer: COMMERCIAL

## 2018-05-05 VITALS
TEMPERATURE: 97.9 F | SYSTOLIC BLOOD PRESSURE: 148 MMHG | OXYGEN SATURATION: 96 % | HEART RATE: 85 BPM | DIASTOLIC BLOOD PRESSURE: 84 MMHG

## 2018-05-05 PROCEDURE — G0151 HHCP-SERV OF PT,EA 15 MIN: HCPCS

## 2018-05-05 PROCEDURE — G0299 HHS/HOSPICE OF RN EA 15 MIN: HCPCS

## 2018-05-06 ENCOUNTER — HOME CARE VISIT (OUTPATIENT)
Dept: SCHEDULING | Facility: HOME HEALTH | Age: 63
End: 2018-05-06
Payer: COMMERCIAL

## 2018-05-06 VITALS
TEMPERATURE: 97.6 F | DIASTOLIC BLOOD PRESSURE: 76 MMHG | HEART RATE: 100 BPM | SYSTOLIC BLOOD PRESSURE: 120 MMHG | OXYGEN SATURATION: 98 %

## 2018-05-06 PROCEDURE — G0157 HHC PT ASSISTANT EA 15: HCPCS

## 2018-05-07 ENCOUNTER — HOME CARE VISIT (OUTPATIENT)
Dept: HOME HEALTH SERVICES | Facility: HOME HEALTH | Age: 63
End: 2018-05-07
Payer: COMMERCIAL

## 2018-05-07 ENCOUNTER — HOME CARE VISIT (OUTPATIENT)
Dept: SCHEDULING | Facility: HOME HEALTH | Age: 63
End: 2018-05-07
Payer: COMMERCIAL

## 2018-05-07 VITALS
OXYGEN SATURATION: 97 % | DIASTOLIC BLOOD PRESSURE: 90 MMHG | SYSTOLIC BLOOD PRESSURE: 140 MMHG | HEART RATE: 102 BPM | TEMPERATURE: 98.5 F

## 2018-05-07 PROCEDURE — G0157 HHC PT ASSISTANT EA 15: HCPCS

## 2018-05-08 ENCOUNTER — HOME CARE VISIT (OUTPATIENT)
Dept: SCHEDULING | Facility: HOME HEALTH | Age: 63
End: 2018-05-08
Payer: COMMERCIAL

## 2018-05-08 PROCEDURE — G0157 HHC PT ASSISTANT EA 15: HCPCS

## 2018-05-09 ENCOUNTER — HOME CARE VISIT (OUTPATIENT)
Dept: SCHEDULING | Facility: HOME HEALTH | Age: 63
End: 2018-05-09
Payer: COMMERCIAL

## 2018-05-09 VITALS
HEART RATE: 99 BPM | OXYGEN SATURATION: 99 % | TEMPERATURE: 97.9 F | OXYGEN SATURATION: 98 % | DIASTOLIC BLOOD PRESSURE: 80 MMHG | HEART RATE: 93 BPM | SYSTOLIC BLOOD PRESSURE: 130 MMHG | SYSTOLIC BLOOD PRESSURE: 152 MMHG | TEMPERATURE: 98.2 F | DIASTOLIC BLOOD PRESSURE: 90 MMHG

## 2018-05-09 PROCEDURE — G0157 HHC PT ASSISTANT EA 15: HCPCS

## 2018-05-10 ENCOUNTER — OFFICE VISIT (OUTPATIENT)
Dept: FAMILY MEDICINE CLINIC | Facility: CLINIC | Age: 63
End: 2018-05-10

## 2018-05-10 ENCOUNTER — HOME CARE VISIT (OUTPATIENT)
Dept: SCHEDULING | Facility: HOME HEALTH | Age: 63
End: 2018-05-10
Payer: COMMERCIAL

## 2018-05-10 VITALS
SYSTOLIC BLOOD PRESSURE: 104 MMHG | HEART RATE: 92 BPM | RESPIRATION RATE: 20 BRPM | OXYGEN SATURATION: 98 % | TEMPERATURE: 97.3 F | DIASTOLIC BLOOD PRESSURE: 80 MMHG

## 2018-05-10 VITALS
HEIGHT: 69 IN | WEIGHT: 253 LBS | RESPIRATION RATE: 16 BRPM | TEMPERATURE: 98.1 F | DIASTOLIC BLOOD PRESSURE: 78 MMHG | SYSTOLIC BLOOD PRESSURE: 126 MMHG | BODY MASS INDEX: 37.47 KG/M2 | OXYGEN SATURATION: 97 % | HEART RATE: 89 BPM

## 2018-05-10 DIAGNOSIS — Z09 HOSPITAL DISCHARGE FOLLOW-UP: ICD-10-CM

## 2018-05-10 DIAGNOSIS — Z96.651 S/P REVISION OF TOTAL KNEE, RIGHT: ICD-10-CM

## 2018-05-10 DIAGNOSIS — M1A.09X0 CHRONIC GOUT OF MULTIPLE SITES, UNSPECIFIED CAUSE: ICD-10-CM

## 2018-05-10 DIAGNOSIS — I10 HTN, GOAL BELOW 140/80: Primary | ICD-10-CM

## 2018-05-10 DIAGNOSIS — M79.89 LEG SWELLING: ICD-10-CM

## 2018-05-10 PROCEDURE — G0151 HHCP-SERV OF PT,EA 15 MIN: HCPCS

## 2018-05-10 RX ORDER — FUROSEMIDE 20 MG/1
20 TABLET ORAL DAILY
Qty: 30 TAB | Refills: 0 | Status: SHIPPED | OUTPATIENT
Start: 2018-05-10 | End: 2018-06-19 | Stop reason: SDUPTHER

## 2018-05-10 RX ORDER — ALLOPURINOL 100 MG/1
100 TABLET ORAL 2 TIMES DAILY
Qty: 60 TAB | Refills: 1 | Status: SHIPPED | OUTPATIENT
Start: 2018-05-10 | End: 2018-07-19 | Stop reason: SDUPTHER

## 2018-05-10 NOTE — PATIENT INSTRUCTIONS
Gout: Care Instructions  Your Care Instructions    Gout is a form of arthritis caused by a buildup of uric acid crystals in a joint. It causes sudden attacks of pain, swelling, redness, and stiffness, usually in one joint, especially the big toe. Gout usually comes on without a cause. But it can be brought on by drinking alcohol (especially beer) or eating seafood and red meat. Taking certain medicines, such as diuretics or aspirin, also can bring on an attack of gout. Taking your medicines as prescribed and following up with your doctor regularly can help you avoid gout attacks in the future. Follow-up care is a key part of your treatment and safety. Be sure to make and go to all appointments, and call your doctor if you are having problems. It's also a good idea to know your test results and keep a list of the medicines you take. How can you care for yourself at home? · If the joint is swollen, put ice or a cold pack on the area for 10 to 20 minutes at a time. Put a thin cloth between the ice and your skin. · Prop up the sore limb on a pillow when you ice it or anytime you sit or lie down during the next 3 days. Try to keep it above the level of your heart. This will help reduce swelling. · Rest sore joints. Avoid activities that put weight or strain on the joints for a few days. Take short rest breaks from your regular activities during the day. · Take your medicines exactly as prescribed. Call your doctor if you think you are having a problem with your medicine. · Take pain medicines exactly as directed. ¨ If the doctor gave you a prescription medicine for pain, take it as prescribed. ¨ If you are not taking a prescription pain medicine, ask your doctor if you can take an over-the-counter medicine. · Eat less seafood and red meat. · Check with your doctor before drinking alcohol. · Losing weight, if you are overweight, may help reduce attacks of gout. But do not go on a [employer]. \" Losing a lot of weight in a short amount of time can cause a gout attack. When should you call for help? Call your doctor now or seek immediate medical care if:  ? · You have a fever. ? · The joint is so painful you cannot use it. ? · You have sudden, unexplained swelling, redness, warmth, or severe pain in one or more joints. ? Watch closely for changes in your health, and be sure to contact your doctor if:  ? · You have joint pain. ? · Your symptoms get worse or are not improving after 2 or 3 days. Where can you learn more? Go to http://nahid-maureen.info/. Enter Y343 in the search box to learn more about \"Gout: Care Instructions. \"  Current as of: October 31, 2016  Content Version: 11.4  © 1224-9640 [employer]. Care instructions adapted under license by [employer] (which disclaims liability or warranty for this information). If you have questions about a medical condition or this instruction, always ask your healthcare professional. [place] [address] any warranty or liability for your use of this information. Total Knee Replacement: What to Expect at [address] [place] [address]    When you leave the hospital, you should be able to move around with a walker or crutches. But you will need someone to help you at home for the next few weeks or until you have more energy and can move around better. If there is no one to help you at home, you may go to a rehabilitation center. You will go home with a bandage and stitches or staples. Change the bandage as your doctor tells you to. Your doctor will remove your stitches or staples 10 to 21 days after your surgery. You may still have some mild pain, and the area may be swollen for 3 to 6 months after surgery. Your knee will continue to improve for 6 to 12 months. You will probably use a walker for 1 to 3 weeks and then use crutches. When you are ready, you can use a cane.  You will probably be able to walk on your own in 4 to 8 weeks. You will need to do months of physical rehabilitation (rehab) after a knee replacement. Rehab will help you strengthen the muscles of the knee and help you regain movement. After you recover, your artificial knee will allow you to do normal daily activities with less pain or no pain at all. You may be able to hike, dance, ride a bike, and play golf. Talk to your doctor about whether you can do more strenuous activities. Always tell your caregivers that you have an artificial knee. How long it will take to walk on your own, return to normal activities, and go back to work depends on your health and how well your rehabilitation (rehab) program goes. The better you do with your rehab exercises, the quicker you will get your strength and movement back. This care sheet gives you a general idea about how long it will take for you to recover. But each person recovers at a different pace. Follow the steps below to get better as quickly as possible. How can you care for yourself at home? Activity  ? · Rest when you feel tired. You may take a nap, but do not stay in bed all day. When you sit, use a chair with arms. You can use the arms to help you stand up. ? · Work with your physical therapist to find the best way to exercise. You may be able to take frequent, short walks using crutches or a walker. What you can do as your knee heals will depend on whether your new knee is cemented or uncemented. You may not be able to do certain things for a while if your new knee is uncemented. ? · After your knee has healed enough, you can do more strenuous activities with caution. ¨ You can golf, but use a golf cart, and do not wear shoes with spikes. ¨ You can bike on a flat road or on a stationary bike. Avoid biking up hills. ¨ Your doctor may suggest that you stay away from activities that put stress on your knee.  These include tennis or badminton, squash or racquetball, contact sports like football, jumping (such as in basketball), jogging, or running. ¨ Avoid activities where you might fall. These include horseback riding, skiing, and mountain biking. ? · Do not sit for more than 1 hour at a time. Get up and walk around for a while before you sit again. If you must sit for a long time, prop up your leg with a chair or footstool. This will help you avoid swelling. ? · Ask your doctor when you can shower. You may need to take sponge baths until your stitches or staples have been removed. ? · Ask your doctor when you can drive again. It may take up to 8 weeks after knee replacement surgery before it is safe for you to drive. ? · When you get into a car, sit on the edge of the seat. Then pull in your legs, and turn to face the front. ? · You should be able to do many everyday activities 3 to 6 weeks after your surgery. You will probably need to take 4 to 16 weeks off from work. When you can go back to work depends on the type of work you do and how you feel. ? · Ask your doctor when it is okay for you to have sex. ? · Do not lift anything heavier than 10 pounds and do not lift weights for 12 weeks. Diet  ? · By the time you leave the hospital, you should be eating your normal diet. If your stomach is upset, try bland, low-fat foods like plain rice, broiled chicken, toast, and yogurt. Your doctor may suggest that you take iron and vitamin supplements. ? · Drink plenty of fluids (unless your doctor tells you not to). ? · Eat healthy foods, and watch your portion sizes. Try to stay at your ideal weight. Too much weight puts more stress on your new knee. ? · You may notice that your bowel movements are not regular right after your surgery. This is common. Try to avoid constipation and straining with bowel movements. You may want to take a fiber supplement every day. If you have not had a bowel movement after a couple of days, ask your doctor about taking a mild laxative. Medicines  ? · Your doctor will tell you if and when you can restart your medicines. He or she will also give you instructions about taking any new medicines. ? · If you take blood thinners, such as warfarin (Coumadin), clopidogrel (Plavix), or aspirin, be sure to talk to your doctor. He or she will tell you if and when to start taking those medicines again. Make sure that you understand exactly what your doctor wants you to do.   ? · Your doctor may give you a blood-thinning medicine to prevent blood clots. If you take a blood thinner, be sure you get instructions about how to take your medicine safely. Blood thinners can cause serious bleeding problems. This medicine could be in pill form or as a shot (injection). If a shot is necessary, your doctor will tell you how to do this. ? · Be safe with medicines. Take pain medicines exactly as directed. ¨ If the doctor gave you a prescription medicine for pain, take it as prescribed. ¨ If you are not taking a prescription pain medicine, ask your doctor if you can take an over-the-counter medicine. ¨ Plan to take your pain medicine 30 minutes before exercises. It is easier to prevent pain before it starts than to stop it once it has started. ? · If you think your pain medicine is making you sick to your stomach:  ¨ Take your medicine after meals (unless your doctor has told you not to). ¨ Ask your doctor for a different pain medicine. ? · If your doctor prescribed antibiotics, take them as directed. Do not stop taking them just because you feel better. You need to take the full course of antibiotics. Incision care  ? · You will have a bandage over the cut (incision) and staples or stitches. Take the bandage off when your doctor says it is okay. ? · Your doctor will remove the staples or stitches 10 days to 3 weeks after the surgery and replace them with strips of tape. Leave the tape on for a week or until it falls off. Exercise  ?  · Your rehab program will give you a number of exercises to do to help you get back your knee's range of motion and strength. Always do them as your therapist tells you. Ice and elevation  ? · For pain and swelling, put ice or a cold pack on the area for 10 to 20 minutes at a time. Put a thin cloth between the ice and your skin. Other instructions  ? · Continue to wear your support stockings as your doctor says. These help to prevent blood clots. The length of time that you will have to wear them depends on your activity level and the amount of swelling. ? · You have metal pieces in your knee. These may set off some airport metal detectors. Carry a medical alert card that says you have an artificial joint, just in case. Follow-up care is a key part of your treatment and safety. Be sure to make and go to all appointments, and call your doctor if you are having problems. It's also a good idea to know your test results and keep a list of the medicines you take. When should you call for help? Call 911 anytime you think you may need emergency care. For example, call if:  ? · You passed out (lost consciousness). ? · You have severe trouble breathing. ? · You have sudden chest pain and shortness of breath, or you cough up blood. ?Call your doctor now or seek immediate medical care if:  ? · You have signs of infection, such as:  ¨ Increased pain, swelling, warmth, or redness. ¨ Red streaks leading from the incision. ¨ Pus draining from the incision. ¨ A fever. ? · You have signs of a blood clot, such as:  ¨ Pain in your calf, back of the knee, thigh, or groin. ¨ Redness and swelling in your leg or groin. ? · Your incision comes open and begins to bleed, or the bleeding increases. ? · You have pain that does not get better after you take pain medicine. ? Watch closely for changes in your health, and be sure to contact your doctor if:  ? · You do not have a bowel movement after taking a laxative. Where can you learn more?   Go to http://nahid-maureen.info/. Enter T715 in the search box to learn more about \"Total Knee Replacement: What to Expect at Home. \"  Current as of: March 21, 2017  Content Version: 11.4  © 8472-4634 [employer]. Care instructions adapted under license by [employer] (which disclaims liability or warranty for this information). If you have questions about a medical condition or this instruction, always ask your healthcare professional. [address] any warranty or liability for your use of this information.

## 2018-05-10 NOTE — PROGRESS NOTES
HISTORY OF PRESENT ILLNESS  Mandy Ling is a 65 y.o. female. HPI Comments: Hospital discharge followed up. 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 She reports she is doing well today. Pain 3/10. She reports she has been referred to a specialist in [place] for another \"look\" at her knee. C/o RT LE swelling. Requesting a refill on medications today. HTN: BP stable/improving. BP is not measured at home. Denies any palpitations. Hypertension    The history is provided by the patient. This is a chronic problem. The current episode started more than 1 week ago. The problem has been rapidly improving. Risk factors include family history and dyslipidemia. Surgical Follow-up   The history is provided by the patient. This is a new problem. Review of Systems   Constitutional: Negative. Respiratory: Negative. Cardiovascular: Negative. Genitourinary: Negative. Musculoskeletal: Positive for joint pain. Neurological: Negative. Past Medical History:   Diagnosis Date    Arthritis     Elevated cholesterol     Hypertension     Joint pain     knees     Past Surgical History:   Procedure Laterality Date    HX HYSTERECTOMY      HX KNEE REPLACEMENT      HX ORTHOPAEDIC Right Feb, 2016    TKR    HX TONSILLECTOMY       Current Outpatient Prescriptions on File Prior to Visit   Medication Sig Dispense Refill    morinda citrifolia fruit (ELIO) 250 mg cap Take 1 Cap by mouth daily.  buffered aspirin (BUFFERIN) 325 mg tablet Take 1 Tab by mouth two (2) times a day. 60 Tab 0    celecoxib (CELEBREX) 200 mg capsule Take 1 Cap by mouth two (2) times a day for 90 days. 60 Cap 2    oxyCODONE-acetaminophen (PERCOCET)  mg per tablet Take 1-2 Tabs by mouth every four (4) hours as needed for Pain. Max Daily Amount: 12 Tabs. 60 Tab 0    citalopram (CELEXA) 20 mg tablet TAKE 1 TAB BY MOUTH DAILY.   3    amLODIPine (NORVASC) 10 mg tablet TAKE 1 TABLET BY MOUTH DAILY 90 Tab 3    atorvastatin (LIPITOR) 20 mg tablet TAKE 1 TABLET BY MOUTH DAILY 90 Tab 3    losartan-hydroCHLOROthiazide (HYZAAR) 100-25 mg per tablet TAKE 1 TABLET BY MOUTH DAILY. 90 Tab 3    ferrous sulfate 325 mg (65 mg iron) tablet TAKE 1 TABLET BY MOUTH TWICE A DAY WITH MEALS 60 Tab 3    fexofenadine (ALLEGRA) 180 mg tablet Take 1 Tab by mouth daily. 30 Tab 3    tiZANidine (ZANAFLEX) 4 mg tablet TAKE 1 TAB BY MOUTH THREE (3) TIMES DAILY AS NEEDED. 90 Tab 0    Comp. Stocking,Knee,Regular,Lrg misc Use daily and take off at night, 20mmHg 1 Packet 0    polyethylene glycol (MIRALAX) 17 gram/dose powder Take as directed by office (Patient taking differently: as needed. Take as directed by office) 255 g 0    MULTIVIT-MIN/FA/CALCIUM/VIT K1 (ONE-A-DAY WOMEN'S 50+ PO) Take 1 Tab by mouth daily.  Magnesium Oxide 500 mg cap Take 400 mg by mouth daily. No current facility-administered medications on file prior to visit. Allergies and Intolerances:   No Known Allergies    Family History:   Family History   Problem Relation Age of Onset    Diabetes Mother     Colon Polyps Mother     Dementia Mother     Heart Disease Father     Hypertension Brother        Social History:   She  reports that she has never smoked. She has never used smokeless tobacco. She  reports that she does not drink alcohol. Vitals:   Visit Vitals    /78    Pulse 89    Temp 98.1 °F (36.7 °C)    Resp 16    Ht 5' 9\" (1.753 m)    Wt 253 lb (114.8 kg)    SpO2 97%    BMI 37.36 kg/m2     Body surface area is 2.36 meters squared. Physical Exam   Constitutional: She is oriented to person, place, and time. She appears well-developed and well-nourished. HENT:   Head: Atraumatic. Cardiovascular: Normal rate. Pulmonary/Chest: Effort normal.   Musculoskeletal:        Legs:  Ambulating with walker. Neurological: She is alert and oriented to person, place, and time. Skin: Skin is warm.    Psychiatric: She has a normal mood and affect. Her behavior is normal.   Nursing note and vitals reviewed. ASSESSMENT and PLAN    ICD-10-CM ICD-9-CM    1. HTN, goal below 140/80 I10 401.9    2. Hospital discharge follow-up Z09 V67.59    3. S/P revision of total knee, right Z96.651 V43.65    4. Leg swelling M79.89 729.81 furosemide (LASIX) 20 mg tablet   5. Chronic gout of multiple sites, unspecified cause M1A. 09X0 274.02 allopurinol (ZYLOPRIM) 100 mg tablet     Follow-up Disposition:  Return if symptoms worsen or fail to improve. reviewed medications and side effects in detail    - Alarm signals discussed. ER precautions  - Plan of care reviewed with patient. Understanding verbalized and they are in agreement with plan of care.

## 2018-05-10 NOTE — MR AVS SNAPSHOT
[address] [facility] 
 
 
 [address] [facility] [address] [facility] [address] 
[phone] Patient: Armando Adams MRN: [id number] QSA:[id number] Visit Information Date & Time Provider Department Dept. Phone Encounter #  
 5/10/2018 11:30 AM Carlito Maki NP [facility] [phone] Your Appointments 5/17/2018 11:00 AM  
POST OP with Tiffany Diaz, Deond[facility] [facility] - [facility]ega ([address]) Appt Note: RIGHT TOTAL KNEE REVISION/ZULEMA PATIENT  
 [address], [address] [place] [address]  
[phone]  
  
   
 [address], [address] [address] 8/17/2018  9:30 AM  
ROUTINE CARE with Carlito Maki NP [facility] Main Office ([address]) Appt Note: 4 month follow up appt. for HTN, IGT- A1c at next visit. .  
 [address] [facility] [address] [facility] [address]  
[phone]  
  
   
 [address] Upcoming Health Maintenance Date Due FOBT Q 1 YEAR AGE 50-75 7/23/2006 ZOSTER VACCINE AGE 60> 5/23/2016 Influenza Age 5 to Adult 8/1/2018 BREAST CANCER SCRN MAMMOGRAM 4/19/2020 DTaP/Tdap/Td series (2 - Td) 10/31/2022 Allergies as of 5/10/2018  Review Complete On: 5/10/2018 By: Lana Aquino No Known Allergies Current Immunizations  Reviewed on 10/12/2016 Name Date Influenza Vaccine (Quad) PF 11/13/2017, 10/12/2016 10:52 AM, 2/1/2016 10:37 AM  
 Influenza Vaccine PF 12/11/2013 TDAP Vaccine 10/31/2012 12:56 AM  
  
 Not reviewed this visit You Were Diagnosed With   
  
 Codes Comments HTN, goal below 140/80    -  Primary ICD-10-CM: I10 
ICD-9-CM: 401.9 Hospital discharge follow-up     ICD-10-CM: [facility] ICD-9-CM: V67.59 S/P revision of total knee, right     ICD-10-CM: C76.958 ICD-9-CM: V43.65 Leg swelling     ICD-10-CM: M79.89 ICD-9-CM: 729.81   
 Chronic gout of multiple sites, unspecified cause     ICD-10-CM: M1A. 24R3 ICD-9-CM: 274.02 Vitals BP Pulse Temp Resp Height(growth percentile) Weight(growth percentile) 126/78 89 98.1 °F (36.7 °C) 16 5' 9\" (1.753 m) 253 lb (114.8 kg) SpO2 BMI OB Status Smoking Status 97% 37.36 kg/m2 Hysterectomy Never Smoker Vitals History BMI and BSA Data Body Mass Index Body Surface Area  
 37.36 kg/m 2 2.36 m 2 Preferred Pharmacy Pharmacy Name Phone [id number]  [phone] Your Updated Medication List  
  
   
This list is accurate as of 5/10/18 12:06 PM.  Always use your most recent med list.  
  
  
  
  
 allopurinol 100 mg tablet Commonly known as:  Adriane Nip Take 1 Tab by mouth two (2) times a day. amLODIPine 10 mg tablet Commonly known as:  [place] TAKE 1 TABLET BY MOUTH DAILY  
  
 atorvastatin 20 mg tablet Commonly known as:  LIPITOR  
TAKE 1 TABLET BY MOUTH DAILY  
  
 buffered aspirin 325 mg tablet Commonly known as:  BUFFERIN Take 1 Tab by mouth two (2) times a day. celecoxib 200 mg capsule Commonly known as:  CELEBREX Take 1 Cap by mouth two (2) times a day for 90 days. citalopram 20 mg tablet Commonly known as:  CELEXA  
TAKE 1 TAB BY MOUTH DAILY. Comp. [address] Use daily and take off at night, 20mmHg  
  
 ferrous sulfate 325 mg (65 mg iron) tablet TAKE 1 TABLET BY MOUTH TWICE A DAY WITH MEALS  
  
 fexofenadine 180 mg tablet Commonly known as:  Ray Kugel Take 1 Tab by mouth daily. furosemide 20 mg tablet Commonly known as:  LASIX Take 1 Tab by mouth daily. Daily x 5 days then as needed daily for swelling  
  
 losartan-hydroCHLOROthiazide 100-25 mg per tablet Commonly known as:  HYZAAR  
TAKE 1 TABLET BY MOUTH DAILY. Magnesium Oxide 500 mg Cap Take 400 mg by mouth daily. ELIO 250 mg Cap Generic drug:  morinda citrifolia fruit Take 1 Cap by mouth daily. ONE-A-DAY WOMEN'S 50+ PO Take 1 Tab by mouth daily. oxyCODONE-acetaminophen  mg per tablet Commonly known as:  PERCOCET Take 1-2 Tabs by mouth every four (4) hours as needed for Pain. Max Daily Amount: 12 Tabs. polyethylene glycol 17 gram/dose powder Commonly known as:  Lugene Minder Take as directed by office  
  
 tiZANidine 4 mg tablet Commonly known as:  Patsey Beets TAKE 1 TAB BY MOUTH THREE (3) TIMES DAILY AS NEEDED. Prescriptions Sent to Pharmacy Refills  
 furosemide (LASIX) 20 mg tablet 0 Sig: Take 1 Tab by mouth daily. Daily x 5 days then as needed daily for swelling Class: Normal  
 Pharmacy: [address] [place] [address] Ph #: [phone] Route: Oral  
 allopurinol (ZYLOPRIM) 100 mg tablet 1 Sig: Take 1 Tab by mouth two (2) times a day. Class: Normal  
 Pharmacy: [address] [place] [address] Ph #: [phone] Route: Oral  
  
To-Do List   
 05/11/2018 To Be Determined Appointment with Lenny Jimenez RN at [address] [place] [address] SCHEDULING/INTAKE  
  
 05/11/2018 12:00 PM  
  Appointment with Zulema Conway at [address] [place] [address] MED CTR  
  
 05/12/2018 To Be Determined Appointment with Zulema Man at [address] [place] [address] REG MED CTR  
  
 05/13/2018 To Be Determined Appointment with Zulema Grulla at [address] [place] [address] REG MED CTR  
  
 05/14/2018 To Be Determined Appointment with Zulema Man at [address] [place] [address] REG MED [facility]  
  
 05/15/2018 To Be Determined Appointment with Zulema Man at [address] [place] [address] REG MED [facility]  
  
 05/16/2018 To Be Determined   Appointment with Zulema Man at [address] [place] [address]  
  
 05/17/2018 To Be Determined Appointment with Shreya Pierce PT at [address] [facility] Patient Instructions Gout: Care Instructions Your Care Instructions Gout is a form of arthritis caused by a buildup of uric acid crystals in a joint. It causes sudden attacks of pain, swelling, redness, and stiffness, usually in one joint, especially the big toe. Gout usually comes on without a cause. But it can be brought on by drinking alcohol (especially beer) or eating seafood and red meat. Taking certain medicines, such as diuretics or aspirin, also can bring on an attack of gout. Taking your medicines as prescribed and following up with your doctor regularly can help you avoid gout attacks in the future. Follow-up care is a key part of your treatment and safety. Be sure to make and go to all appointments, and call your doctor if you are having problems. It's also a good idea to know your test results and keep a list of the medicines you take. How can you care for yourself at home? · If the joint is swollen, put ice or a cold pack on the area for 10 to 20 minutes at a time. Put a thin cloth between the ice and your skin. · Prop up the sore limb on a pillow when you ice it or anytime you sit or lie down during the next 3 days. Try to keep it above the level of your heart. This will help reduce swelling. · Rest sore joints. Avoid activities that put weight or strain on the joints for a few days. Take short rest breaks from your regular activities during the day. · Take your medicines exactly as prescribed. Call your doctor if you think you are having a problem with your medicine. · Take pain medicines exactly as directed. ¨ If the doctor gave you a prescription medicine for pain, take it as prescribed. ¨ If you are not taking a prescription pain medicine, ask your doctor if you can take an over-the-counter medicine. · Eat less seafood and red meat. · Check with your doctor before drinking alcohol. · Losing weight, if you are overweight, may help reduce attacks of gout. But do not go on a [employer]. \" Losing a lot of weight in a short amount of time can cause a gout attack. When should you call for help? Call your doctor now or seek immediate medical care if: 
? · You have a fever. ? · The joint is so painful you cannot use it. ? · You have sudden, unexplained swelling, redness, warmth, or severe pain in one or more joints. ? Watch closely for changes in your health, and be sure to contact your doctor if: 
? · You have joint pain. ? · Your symptoms get worse or are not improving after 2 or 3 days. Where can you learn more? Go to http://nahid-maureen.info/. Enter K849 in the search box to learn more about \"Gout: Care Instructions. \" Current as of: October 31, 2016 Content Version: 11.4 © 7689-5096 [employer]. Care instructions adapted under license by [employer] (which disclaims liability or warranty for this information). If you have questions about a medical condition or this instruction, always ask your healthcare professional. [address] any warranty or liability for your use of this information. Total Knee Replacement: What to Expect at Home Your Recovery When you leave the hospital, you should be able to move around with a walker or crutches. But you will need someone to help you at home for the next few weeks or until you have more energy and can move around better. If there is no one to help you at home, you may go to a rehabilitation center. You will go home with a bandage and stitches or staples. Change the bandage as your doctor tells you to. Your doctor will remove your stitches or staples 10 to 21 days after your surgery. You may still have some mild pain, and the area may be swollen for 3 to 6 months after surgery. Your knee will continue to improve for 6 to 12 months. You will probably use a walker for 1 to 3 weeks and then use crutches. When you are ready, you can use a cane. You will probably be able to walk on your own in 4 to 8 weeks. You will need to do months of physical rehabilitation (rehab) after a knee replacement. Rehab will help you strengthen the muscles of the knee and help you regain movement. After you recover, your artificial knee will allow you to do normal daily activities with less pain or no pain at all. You may be able to hike, dance, ride a bike, and play golf. Talk to your doctor about whether you can do more strenuous activities. Always tell your caregivers that you have an artificial knee. How long it will take to walk on your own, return to normal activities, and go back to work depends on your health and how well your rehabilitation (rehab) program goes. The better you do with your rehab exercises, the quicker you will get your strength and movement back. This care sheet gives you a general idea about how long it will take for you to recover. But each person recovers at a different pace. Follow the steps below to get better as quickly as possible. How can you care for yourself at home? Activity ? · Rest when you feel tired. You may take a nap, but do not stay in bed all day. When you sit, use a chair with arms. You can use the arms to help you stand up. ? · Work with your physical therapist to find the best way to exercise. You may be able to take frequent, short walks using crutches or a walker. What you can do as your knee heals will depend on whether your new knee is cemented or uncemented. You may not be able to do certain things for a while if your new knee is uncemented. ? · After your knee has healed enough, you can do more strenuous activities with caution. ¨ You can golf, but use a golf cart, and do not wear shoes with spikes. ¨ You can bike on a flat road or on a stationary bike. Avoid biking up hills. ¨ Your doctor may suggest that you stay away from activities that put stress on your knee. These include tennis or badminton, squash or racquetball, contact sports like football, jumping (such as in basketball), jogging, or running. ¨ Avoid activities where you might fall. These include horseback riding, skiing, and mountain biking. ? · Do not sit for more than 1 hour at a time. Get up and walk around for a while before you sit again. If you must sit for a long time, prop up your leg with a chair or footstool. This will help you avoid swelling. ? · Ask your doctor when you can shower. You may need to take sponge baths until your stitches or staples have been removed. ? · Ask your doctor when you can drive again. It may take up to 8 weeks after knee replacement surgery before it is safe for you to drive. ? · When you get into a car, sit on the edge of the seat. Then pull in your legs, and turn to face the front. ? · You should be able to do many everyday activities 3 to 6 weeks after your surgery. You will probably need to take 4 to 16 weeks off from work. When you can go back to work depends on the type of work you do and how you feel. ? · Ask your doctor when it is okay for you to have sex. ? · Do not lift anything heavier than 10 pounds and do not lift weights for 12 weeks. Diet ? · By the time you leave the hospital, you should be eating your normal diet. If your stomach is upset, try bland, low-fat foods like plain rice, broiled chicken, toast, and yogurt. Your doctor may suggest that you take iron and vitamin supplements. ? · Drink plenty of fluids (unless your doctor tells you not to). ? · Eat healthy foods, and watch your portion sizes. Try to stay at your ideal weight. Too much weight puts more stress on your new knee.   
? · You may notice that your bowel movements are not regular right after your surgery. This is common. Try to avoid constipation and straining with bowel movements. You may want to take a fiber supplement every day. If you have not had a bowel movement after a couple of days, ask your doctor about taking a mild laxative. Medicines ? · Your doctor will tell you if and when you can restart your medicines. He or she will also give you instructions about taking any new medicines. ? · If you take blood thinners, such as warfarin (Coumadin), clopidogrel (Plavix), or aspirin, be sure to talk to your doctor. He or she will tell you if and when to start taking those medicines again. Make sure that you understand exactly what your doctor wants you to do.  
? · Your doctor may give you a blood-thinning medicine to prevent blood clots. If you take a blood thinner, be sure you get instructions about how to take your medicine safely. Blood thinners can cause serious bleeding problems. This medicine could be in pill form or as a shot (injection). If a shot is necessary, your doctor will tell you how to do this. ? · Be safe with medicines. Take pain medicines exactly as directed. ¨ If the doctor gave you a prescription medicine for pain, take it as prescribed. ¨ If you are not taking a prescription pain medicine, ask your doctor if you can take an over-the-counter medicine. ¨ Plan to take your pain medicine 30 minutes before exercises. It is easier to prevent pain before it starts than to stop it once it has started. ? · If you think your pain medicine is making you sick to your stomach: 
¨ Take your medicine after meals (unless your doctor has told you not to). ¨ Ask your doctor for a different pain medicine. ? · If your doctor prescribed antibiotics, take them as directed. Do not stop taking them just because you feel better. You need to take the full course of antibiotics. Incision care ?  · You will have a bandage over the cut (incision) and staples or stitches. Take the bandage off when your doctor says it is okay. ? · Your doctor will remove the staples or stitches 10 days to 3 weeks after the surgery and replace them with strips of tape. Leave the tape on for a week or until it falls off. Exercise ? · Your rehab program will give you a number of exercises to do to help you get back your knee's range of motion and strength. Always do them as your therapist tells you. Ice and elevation ? · For pain and swelling, put ice or a cold pack on the area for 10 to 20 minutes at a time. Put a thin cloth between the ice and your skin. Other instructions ? · Continue to wear your support stockings as your doctor says. These help to prevent blood clots. The length of time that you will have to wear them depends on your activity level and the amount of swelling. ? · You have metal pieces in your knee. These may set off some airport metal detectors. Carry a medical alert card that says you have an artificial joint, just in case. Follow-up care is a key part of your treatment and safety. Be sure to make and go to all appointments, and call your doctor if you are having problems. It's also a good idea to know your test results and keep a list of the medicines you take. When should you call for help? Call 911 anytime you think you may need emergency care. For example, call if: 
? · You passed out (lost consciousness). ? · You have severe trouble breathing. ? · You have sudden chest pain and shortness of breath, or you cough up blood. ?Call your doctor now or seek immediate medical care if: 
? · You have signs of infection, such as: 
¨ Increased pain, swelling, warmth, or redness. ¨ Red streaks leading from the incision. ¨ Pus draining from the incision. ¨ A fever. ? · You have signs of a blood clot, such as: 
¨ Pain in your calf, back of the knee, thigh, or groin. ¨ Redness and swelling in your leg or groin. ? · Your incision comes open and begins to bleed, or the bleeding increases. ? · You have pain that does not get better after you take pain medicine. ? Watch closely for changes in your health, and be sure to contact your doctor if: 
? · You do not have a bowel movement after taking a laxative. Where can you learn more? Go to http://nahid-maureen.info/. Enter F792 in the search box to learn more about \"Total Knee Replacement: What to Expect at Home. \" Current as of: March 21, 2017 Content Version: 11.4 © 8072-7890 [email or web address]. Care instructions adapted under license by [employer] (which disclaims liability or warranty for this information). If you have questions about a medical condition or this instruction, always ask your healthcare professional. [address] any warranty or liability for your use of this information. Introducing [facility]! [employer] introduces UrtheCast patient portal. Now you can access parts of your medical record, email your doctor's office, and request medication refills online. 1. In your internet browser, go to [email or web address] 2. Click on the First Time User? Click Here link in the Sign In box. You will see the New Member Sign Up page. 3. Enter your UrtheCast Access Code exactly as it appears below. You will not need to use this code after youve completed the sign-up process. If you do not sign up before the expiration date, you must request a new code. · UrtheCast Access Code: [email or web address] Expires: 5/30/2018 12:02 PM 
 
4. Enter the last four digits of your Social Security Number (xxxx) and Date of Birth (mm/dd/yyyy) as indicated and click Submit. You will be taken to the next sign-up page. 5. Create a UrtheCast ID. This will be your UrtheCast login ID and cannot be changed, so think of one that is secure and easy to remember. 6. Create a [employer] password. You can change your password at any time. 7. Enter your Password Reset Question and Answer. This can be used at a later time if you forget your password. 8. Enter your e-mail address. You will receive e-mail notification when new information is available in [address] 9. Click Sign Up. You can now view and download portions of your medical record. 10. Click the Download Summary menu link to download a portable copy of your medical information. If you have questions, please visit the Frequently Asked Questions section of the [employer] website. Remember, [employer] is NOT to be used for urgent needs. For medical emergencies, dial 911. Now available from your iPhone and Android! Please provide this summary of care documentation to your next provider. Your primary care clinician is listed as Isa Jade. If you have any questions after today's visit, please call [phone].

## 2018-05-11 ENCOUNTER — HOME CARE VISIT (OUTPATIENT)
Dept: SCHEDULING | Facility: HOME HEALTH | Age: 63
End: 2018-05-11
Payer: COMMERCIAL

## 2018-05-11 PROCEDURE — G0157 HHC PT ASSISTANT EA 15: HCPCS

## 2018-05-11 PROCEDURE — G0299 HHS/HOSPICE OF RN EA 15 MIN: HCPCS

## 2018-05-12 ENCOUNTER — HOME CARE VISIT (OUTPATIENT)
Dept: SCHEDULING | Facility: HOME HEALTH | Age: 63
End: 2018-05-12
Payer: COMMERCIAL

## 2018-05-12 VITALS
SYSTOLIC BLOOD PRESSURE: 118 MMHG | TEMPERATURE: 97.3 F | DIASTOLIC BLOOD PRESSURE: 60 MMHG | HEART RATE: 98 BPM | OXYGEN SATURATION: 98 %

## 2018-05-12 VITALS — OXYGEN SATURATION: 98 % | HEART RATE: 98 BPM | DIASTOLIC BLOOD PRESSURE: 70 MMHG | SYSTOLIC BLOOD PRESSURE: 120 MMHG

## 2018-05-12 PROCEDURE — G0157 HHC PT ASSISTANT EA 15: HCPCS

## 2018-05-13 ENCOUNTER — HOME CARE VISIT (OUTPATIENT)
Dept: SCHEDULING | Facility: HOME HEALTH | Age: 63
End: 2018-05-13
Payer: COMMERCIAL

## 2018-05-13 VITALS
HEART RATE: 75 BPM | TEMPERATURE: 97.6 F | DIASTOLIC BLOOD PRESSURE: 80 MMHG | SYSTOLIC BLOOD PRESSURE: 116 MMHG | OXYGEN SATURATION: 98 %

## 2018-05-13 VITALS
TEMPERATURE: 97.8 F | SYSTOLIC BLOOD PRESSURE: 130 MMHG | DIASTOLIC BLOOD PRESSURE: 80 MMHG | OXYGEN SATURATION: 94 % | HEART RATE: 92 BPM

## 2018-05-13 PROCEDURE — G0157 HHC PT ASSISTANT EA 15: HCPCS

## 2018-05-14 ENCOUNTER — HOME CARE VISIT (OUTPATIENT)
Dept: SCHEDULING | Facility: HOME HEALTH | Age: 63
End: 2018-05-14
Payer: COMMERCIAL

## 2018-05-14 VITALS
RESPIRATION RATE: 18 BRPM | SYSTOLIC BLOOD PRESSURE: 115 MMHG | HEART RATE: 96 BPM | OXYGEN SATURATION: 99 % | DIASTOLIC BLOOD PRESSURE: 74 MMHG | TEMPERATURE: 96.1 F

## 2018-05-14 PROCEDURE — G0157 HHC PT ASSISTANT EA 15: HCPCS

## 2018-05-15 ENCOUNTER — HOME CARE VISIT (OUTPATIENT)
Dept: SCHEDULING | Facility: HOME HEALTH | Age: 63
End: 2018-05-15
Payer: COMMERCIAL

## 2018-05-15 VITALS
SYSTOLIC BLOOD PRESSURE: 112 MMHG | TEMPERATURE: 96.8 F | OXYGEN SATURATION: 99 % | DIASTOLIC BLOOD PRESSURE: 80 MMHG | HEART RATE: 90 BPM

## 2018-05-15 PROCEDURE — G0157 HHC PT ASSISTANT EA 15: HCPCS

## 2018-05-16 ENCOUNTER — HOME CARE VISIT (OUTPATIENT)
Dept: SCHEDULING | Facility: HOME HEALTH | Age: 63
End: 2018-05-16
Payer: COMMERCIAL

## 2018-05-16 VITALS
OXYGEN SATURATION: 97 % | DIASTOLIC BLOOD PRESSURE: 90 MMHG | TEMPERATURE: 97.2 F | HEART RATE: 92 BPM | SYSTOLIC BLOOD PRESSURE: 126 MMHG

## 2018-05-16 VITALS
TEMPERATURE: 96.8 F | DIASTOLIC BLOOD PRESSURE: 64 MMHG | SYSTOLIC BLOOD PRESSURE: 109 MMHG | HEART RATE: 79 BPM | OXYGEN SATURATION: 97 % | RESPIRATION RATE: 18 BRPM

## 2018-05-16 PROCEDURE — G0299 HHS/HOSPICE OF RN EA 15 MIN: HCPCS

## 2018-05-16 PROCEDURE — G0151 HHCP-SERV OF PT,EA 15 MIN: HCPCS

## 2018-05-17 ENCOUNTER — HOME CARE VISIT (OUTPATIENT)
Dept: SCHEDULING | Facility: HOME HEALTH | Age: 63
End: 2018-05-17
Payer: COMMERCIAL

## 2018-05-17 ENCOUNTER — OFFICE VISIT (OUTPATIENT)
Dept: ORTHOPEDIC SURGERY | Facility: CLINIC | Age: 63
End: 2018-05-17

## 2018-05-17 VITALS
OXYGEN SATURATION: 98 % | DIASTOLIC BLOOD PRESSURE: 90 MMHG | HEART RATE: 90 BPM | SYSTOLIC BLOOD PRESSURE: 128 MMHG | TEMPERATURE: 97.8 F

## 2018-05-17 VITALS
SYSTOLIC BLOOD PRESSURE: 130 MMHG | OXYGEN SATURATION: 98 % | DIASTOLIC BLOOD PRESSURE: 70 MMHG | HEART RATE: 110 BPM | TEMPERATURE: 98.4 F

## 2018-05-17 VITALS
SYSTOLIC BLOOD PRESSURE: 142 MMHG | DIASTOLIC BLOOD PRESSURE: 81 MMHG | OXYGEN SATURATION: 94 % | BODY MASS INDEX: 37.74 KG/M2 | HEIGHT: 69 IN | TEMPERATURE: 97.3 F | HEART RATE: 96 BPM | WEIGHT: 254.8 LBS | RESPIRATION RATE: 16 BRPM

## 2018-05-17 DIAGNOSIS — G89.18 POST-OPERATIVE PAIN: ICD-10-CM

## 2018-05-17 DIAGNOSIS — T84.012A FAILED TOTAL RIGHT KNEE REPLACEMENT, INITIAL ENCOUNTER (HCC): Primary | ICD-10-CM

## 2018-05-17 PROCEDURE — A6199 ALGINATE DRSG WOUND FILLER: HCPCS

## 2018-05-17 PROCEDURE — G0151 HHCP-SERV OF PT,EA 15 MIN: HCPCS

## 2018-05-17 RX ORDER — OXYCODONE AND ACETAMINOPHEN 10; 325 MG/1; MG/1
1 TABLET ORAL
Qty: 40 TAB | Refills: 0 | Status: SHIPPED | OUTPATIENT
Start: 2018-05-17 | End: 2018-08-17 | Stop reason: SDUPTHER

## 2018-05-17 NOTE — PROGRESS NOTES
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 Patient is improved, states pain is a 1 out of 10.  she has participated in H/H physical therapy. has been doing knee exercises at home. She is going to touch base with [facility] about another surgery. At this point she does not want to bc she can walk without pain. She is going to wait if possible before doing another surgery. She has been wearing her brace. Patient denies any fever, chills, chest pain, shortness of breath or calf pain. There are no new illness or injuries to report since last seen in the office. PHYSICAL EXAMINATION:    Visit Vitals    /81    Pulse 96    Temp 97.3 °F (36.3 °C)    Resp 16    Ht 5' 9\" (1.753 m)    Wt 254 lb 12.8 oz (115.6 kg)    SpO2 94%    BMI 37.63 kg/m2     The patient is a well-developed, well-nourished female in no acute distress. The patient is alert and oriented times three. The patient appears to be well groomed. Mood and affect are normal.   ORTHOPEDIC EXAM of Right knee: Inspection: Effusion present,  incisions clean, dry intact, staples in place  Walk: with brace on   TTP: none  Range of motion: -7-105 flexion  Stability: Stable   Strength: 5/5  2+ distal pulses      IMPRESSION:  Status post Right failed total knee arthroplasty. femoral component showed signs of loosening but unable to remove during surgery. PLAN:  Incisions cleaned. Staples removed and steri strips applied  Patient is weight bearing as tolerated with the brace on and open to full ROM. Brace to assist with instability. She has a smaller plastic piece then before and this may cause her knee to feel unstable. She will most likely be in the brace for 6 weeks. Will set up outpt physical therapy. She will continue working on her ROM at home.   Will refer to [facility] for evaluation and to discuss surgery if needed to remove the femoral component. Patient given a refill of pain medication. Percocet 10 mg Patient given pain medication for short term acute pain relief. Goal is to treat patient according to above plan and to ultimately have patient off all pain medications once appropriate. If chronic pain management is required beyond what is expected for current orthopedic problem, will refer patient to pain management.  was reviewed and will be reviewed with every medication refill request.   Patient will follow up in 2 weeks.  For ROM check    CHERYL N[facility]

## 2018-05-22 ENCOUNTER — HOSPITAL ENCOUNTER (OUTPATIENT)
Dept: PHYSICAL THERAPY | Age: 63
Discharge: HOME | End: 2018-05-22
Payer: COMMERCIAL

## 2018-05-22 DIAGNOSIS — D64.9 MILD CHRONIC ANEMIA: ICD-10-CM

## 2018-05-22 PROCEDURE — 97161 PT EVAL LOW COMPLEX 20 MIN: CPT

## 2018-05-22 PROCEDURE — 97110 THERAPEUTIC EXERCISES: CPT

## 2018-05-22 RX ORDER — LANOLIN ALCOHOL/MO/W.PET/CERES
CREAM (GRAM) TOPICAL
Qty: 60 TAB | Refills: 3 | Status: SHIPPED | OUTPATIENT
Start: 2018-05-22 | End: 2018-11-19

## 2018-05-22 NOTE — PROGRESS NOTES
[facility] [address], [address] [place] [address], [address] [phone] fax      Plan of Care/ Statement of Necessity for Physical Therapy Services    Patient name: Troy James Start of Care: 2018   Referral source: Anya Sahu Fadumodavonma : 1955    Medical Diagnosis: Pain in right knee [M25.561]   Onset Date:2017 with P/O 18   Treatment Diagnosis: decrease tolerance to ADLS and activities due to LOM, decrease strength Right knee, decreased functional mobility right knee   Prior Hospitalization: see medical history Provider#: [id number]   Medications: Verified on Patient summary List    Comorbidities: arthritis, HTN, hearing impaired   Prior Level of Function: : I all areas of ADLs and activities, no AD, working, household chores, community     The Plan of Care and following information is based on the information from the initial evaluation. Assessment/ key information: 64 YO female diagnosed as above and with S/S consistent with above diagnosis presents to skilled outpatient PT. CCO P/O pain and stiffness right knee. She had a TKA right knee 2016 and injured 2017 due to a fall leading to this most recent surgery on 18. She has a P/O brace on that she is to wear for 3 months , open for full ROM. Pain today is 3/10, she indicates another surgery is warranted to be done in [place] and she goes for the consult . Previous Treatment/Compliance: initial TKA right , PT after the injury in 2017, surgery right knee 18 for failed TKA, , DC to home and had HHPT and now outpatient, ice, medication,   Pain 3, FOTO 53, FALLS RISK IS LOW, no AD use, no LOB observed. Antalgic gait with no AD use, P/O FWBAT in brace which is open  Describe:decrease stance and swing right, assymetrical step length.  Steps and stairs are step to pattern, MMT Right knee F/E 4+, AROM sitting knee F 95 supine 105 and sitting knee E -30 and supine -5. Girth mid patella 49 cm .    palpable edema and mild warmth, steri strips in place. Patient demonstrates the potential to make gains with improved ROM, strength, endurance/activity tolerance, functional FOTO survey score  and all within a reasonable time frame so as to increase their functional independence with ADLs and activities for carryover to  Improved quality of life and tolerance to household chores, community activities and work demands. Patient requires skilled Physical Therapy so as to monitor their response to and modify their treatment plan accordingly.  Patient appears to be an appropriate candidate for skilled outpatient Physical Therapy.     Evaluation Complexity History MEDIUM  Complexity : 1-2 comorbidities / personal factors will impact the outcome/ POC ; Examination MEDIUM Complexity : 3 Standardized tests and measures addressing body structure, function, activity limitation and / or participation in recreation  ;Presentation LOW Complexity : Stable, uncomplicated  ;Clinical Decision Making MEDIUM Complexity : FOTO score of 26-74  Overall Complexity Rating: LOW   Problem List: pain affecting function, decrease ROM, decrease strength, edema affecting function, impaired gait/ balance, decrease ADL/ functional abilitiies, decrease activity tolerance, decrease flexibility/ joint mobility, decrease transfer abilities and other FOTO 53   Treatment Plan may include any combination of the following: Therapeutic exercise, Therapeutic activities, Neuromuscular re-education, Physical agent/modality, Gait/balance training, Manual therapy, Patient education, Self Care training, Functional mobility training, Home safety training and Stair training  Patient / Family readiness to learn indicated by: asking questions, trying to perform skills and interest  Persons(s) to be included in education: patient (P)  Barriers to Learning/Limitations: None  Patient Goal (s): less pain, ROM, walk again without the brace  Patient Self Reported Health Status: good  Rehabilitation Potential: excellent    Short Term Goals: To be accomplished in 5 treatments:   1 patient will have established and be I with HEP to aid with progression of skilled PT program   EVAL issued   CURRENT    2 patient will have FOTO improved to 58 to show increase tolerance to ADLS and activities   EVAL 53   CURRENT    Long Term Goals: To be accomplished in 18 treatments:   1 patient will have FOTO improved to 64  to show increase tolerance to ADLS and activities   EVAL 53   CURRENT    2 patient will have pain 0-1/10 to aid with increase tolerance to ADLS and activities   EVAL 3   CURRENT   3 patient will have MMT Right knee F/E 5/5 to aid with increase tolerance to ADLS and activities   EVAL 4+ Knee F/E   CURRENT   4 patient will report overall 50% improvement to aid with increase tolerance to return to work demands   EVAL OOW   CURRENT    Frequency / Duration: Patient to be seen 2-3 times per week for 18 treatments. Patient/ Caregiver education and instruction: Diagnosis, prognosis, self care, activity modification, brace/ splint application and exercises   [x]  Plan of care has been reviewed with PTA    Vane Castillo, PT 5/22/2018 5:09 PM  ________________________________________________________________________    I certify that the above Therapy Services are being furnished while the patient is under my care. I agree with the treatment plan and certify that this therapy is necessary.     [de-identified] Signature:____________________  Date:____________Time: _________    Please sign and return to [facility] [address] [place] [address], [address] [place] [address], [address] [place] [address],[address]  [phone] fax

## 2018-05-22 NOTE — PROGRESS NOTES
PT DAILY TREATMENT NOTE/KNEE OSKARAL 3-16    Patient Name: Jayce Seek  Date:2018  : 1955  [x]  Patient  Verified  Payor: Bernardo Rather / Plan: [place] [employer]  CAPITATED PT / Product Type: Commerical /    In time:528  Out time:558  Total Treatment Time (min): 30  Total Timed Codes (min): 8  1:1 Treatment Time ( only): 8   Visit #: 1  18    Treatment Area: Pain in right knee [M25.561]    SUBJECTIVE  Pain Level (0-10 scale): 3  []constant [x]intermittent [x]improving []worsening []no change since onset  WORSE sleeping at night, first thing in the morning  BETTER walking, ice   Any medication changes, allergies to medications, adverse drug reactions, diagnosis change, or new procedure performed?: [x] No    [] Yes (see summary sheet for update)  Subjective functional status/changes:     PLOF: I all areas of ADLs and activities, no AD, working, household chores, community  Limitations to PLOF: pain , stiffness   Mechanism of Injury: initially injured 2017 due to a fall, now P.O 18  Current symptoms/Complaints: 64 YO female diagnosed as above and with S/S consistent with above diagnosis presents to skilled outpatient PT. CCO P/O pain and stiffness right knee. She had a TKA right knee  and injured 2017 due to a fall leading to this most recent surgery on 18. She has a P/O brace on that she is to wear for 3 months , open for full ROM. Pain today is 3/10, she indicates another surgery is warranted to be done in [place] and she goes for the consult .    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[id number].: lives in [facility] house, 1 floor, no steps to enter, presently however staying at her sisters house  Pt Goals: less pain, ROM, walk again without the brace  Barriers: [x]pain []financial []time []transportation []other  Motivation: HIGH  Substance use: []Alcohol []Tobacco []other:   FABQ Score: []low []elevate  Cognition: A & O x 4  Other:    OBJECTIVE/EXAMINATION  Domestic Life: work, household chores, community activities  Activity/Recreational Limitations: pain and P/O issues  Mobility: I with P/O brace  Self Care:  I        Modality rationale:     Min Type Additional Details    [] Estim:  []Unatt       []IFC  []Premod                        []Other:  []w/ice   []w/heat  Position:  Location:    [] Estim: []Att    []TENS instruct  []NMES                    []Other:  []w/US   []w/ice   []w/heat  Position:  Location:    []  Traction: [] Cervical       []Lumbar                       [] Prone          []Supine                       []Intermittent   []Continuous Lbs:  [] before manual  [] after manual    []  Ultrasound: []Continuous   [] Pulsed                           []1MHz   []3MHz Location:  W/cm2:    []  Iontophoresis with dexamethasone         Location: [] Take home patch   [] In clinic    []  Ice     []  heat  []  Ice massage  []  Laser   []  Anodyne Position:  Location:    []  Laser with stim  []  Other: Position:  Location:    []  Vasopneumatic Device Pressure:       [] lo [] med [] hi   Temperature: [] lo [] med [] hi   [] Skin assessment post-treatment:  []intact []redness- no adverse reaction    []redness  adverse reaction:     22 min [x]Eval                  []Re-Eval       8 min Therapeutic Exercise:  [] See flow sheet :   Rationale: increase ROM, increase strength and improve coordination to improve the patients ability to aid with increase tolerance to ADLs and activities     min Therapeutic Activity:  []  See flow sheet :   Rationale:   to improve the patients ability to       min Neuromuscular Re-education:  []  See flow sheet :   Rationale:   to improve the patients ability to min Manual Therapy:     Rationale:  to      min Gait Training:  ___ feet with ___ device on level surfaces with ___ level of assist   Rationale:           With   [] TE   [] TA   [] neuro   [] other: Patient Education: [x] Review HEP    [] Progressed/Changed HEP based on:   [] positioning   [] body mechanics   [] transfers   [] heat/ice application    [] other:      Other Objective/Functional Measures: FALLS RISK IS LOW, no AD use, no LOB observed    Physical Therapy Evaluation - Knee    Posture: [] Varus    [] Valgus    [] Recurvatum        [] Tibial Torsion    [] Foot Supination    [] Foot Pronation    Describe:    Gait:  [] Normal    [] Abnormal    [x] Antalgic    [] NWB    Device:P/O FWBAT in brace which is open    Describe:decrease stance and swing right, assymetrical step length  Steps and stairs are step to pattern    ROM / Strength  [] Unable to assess                  AROM                      PROM                   Strength (1-5)    Left Right Left Right Left Right   Hip Flexion          Extension          Abduction          Adduction         Knee Flexion 110 sitting 95 sitting  105 supine   5 4+    Extension -10 sitting -30 sitting  -5 supine   5 4+   Ankle Plantarflexion          Dorsiflexion             Flexibility: [] Unable to assess at this time  Hamstrings:    (L) Tightness= [] WNL   [] Min   [] Mod   [] Severe    (R) Tightness= [] WNL   [] Min   [] Mod   [] Severe  Quadriceps:    (L) Tightness= [] WNL   [] Min   [] Mod   [] Severe    (R) Tightness= [] WNL   [] Min   [] Mod   [] Severe  Gastroc:      (L) Tightness= [] WNL   [] Min   [] Mod   [] Severe    (R) Tightness= [] WNL   [] Min   [] Mod   [] Severe  Other:    Palpation: palpable edema and mild warmth,    Neg/Pos  Neg/Pos  Neg/Pos   Joint Line  Quad tendon  Patellar ligament    Patella  Fibular head  Pes Anserinus    Tibial tubercle  Hamstring tendons  Infrapatellar fat pad      Optional Tests:  Patellar Positioning (Static)   []L []R Normal []L []R Lateral   []L []R Julio Cesarenpapin Chard      []L []R Medial   []L []R [facility] [place]    Patellar Tracking   []L []R Glide (Lat)   []L []R Tilt (Lat)     []L []R Glide (Med)  []L []R Tilt (Med)      []L []R Tile (Inf)     Patellar Mobility   []L []R Hypermobile []L []R Hypomobile         Girth Measurements:     Cm at  Cm above joint line   Cm at   Cm below joint line  Cm at joint line   Left        Right  49 cm         Lachmans  [] Neg    [] Pos Posterior Drawer [] Neg    [] Pos  Pivot Shift  [] Neg    [] Pos Posterior Sag  [] Neg    [] Pos  PHILLY   [] Neg    [] Pos Carlos's Test [] Neg    [] Pos  ALRI   [] Neg    [] Pos Squat   [] Neg    [] Pos  Valgus@ 0 Degrees [] Neg    [] Pos Dimitri-Deni [] Neg    [] Pos  Valgus@ 30 Degrees [] Neg    [] Pos Patellar Apprehension [] Neg    [] Pos  Varus@ 0 Degrees [] Neg    [] Pos Nicolas's Compression [] Neg    [] Pos  Varus@ 30 Degrees [] Neg    [] Pos Ely's Test  [] Neg    [] Pos  Apley's Compression [] Neg    [] Pos Katherine's Test  [] Neg    [] Pos  Apley's Distraction [] Neg    [] Pos Stroke Test  [] Neg    [] Pos   Anterior Drawer [] Neg    [] Pos Fluctuation Test [] Neg    [] Pos  Other:                  [] Neg    [] Pos                 Other tests/comments:       Pain Level (0-10 scale) post treatment: 3    ASSESSMENT/Changes in Function: Patient demonstrates the potential to make gains with improved ROM, strength, endurance/activity tolerance, functional FOTO survey score  and all within a reasonable time frame so as to increase their functional independence with ADLs and activities for carryover to  Improved quality of life and tolerance to household chores, community activities and work demands. Patient requires skilled Physical Therapy so as to monitor their response to and modify their treatment plan accordingly. Patient appears to be an appropriate candidate for skilled outpatient Physical Therapy.     Patient will continue to benefit from skilled PT services to modify and progress therapeutic interventions, address functional mobility deficits, address ROM deficits, address strength deficits, analyze and address soft tissue restrictions, analyze and cue movement patterns, analyze and modify body mechanics/ergonomics, assess and modify postural abnormalities and instruct in home and community integration to attain remaining goals.      [x]  See Plan of Care  []  See progress note/recertification  []  See Discharge Summary         Progress towards goals / Updated goals:       PLAN  [x]  Upgrade activities as tolerated     [x]  Continue plan of care  []  Update interventions per flow sheet       []  Discharge due to:_  []  Other:_      Celio Wright, PT 5/22/2018  5:08 PM

## 2018-05-29 ENCOUNTER — APPOINTMENT (OUTPATIENT)
Dept: PHYSICAL THERAPY | Age: 63
End: 2018-05-29

## 2018-05-30 ENCOUNTER — OFFICE VISIT (OUTPATIENT)
Dept: ORTHOPEDIC SURGERY | Facility: CLINIC | Age: 63
End: 2018-05-30

## 2018-05-30 VITALS
HEART RATE: 91 BPM | TEMPERATURE: 98.4 F | BODY MASS INDEX: 37.74 KG/M2 | OXYGEN SATURATION: 99 % | RESPIRATION RATE: 18 BRPM | WEIGHT: 254.8 LBS | HEIGHT: 69 IN | DIASTOLIC BLOOD PRESSURE: 79 MMHG | SYSTOLIC BLOOD PRESSURE: 138 MMHG

## 2018-05-30 DIAGNOSIS — T84.012D FAILED TOTAL RIGHT KNEE REPLACEMENT, SUBSEQUENT ENCOUNTER: Primary | ICD-10-CM

## 2018-05-30 NOTE — PROGRESS NOTES
Patient: Aamir Case  YOB: 1955       HISTORY:  The patient presents for reevaluation of her s/p right failed total knee arthroplasty loosening of femoral component on 5/2/18. Patient is improved, states pain is a 3 out of 10.  she has participated in outpatient physical therapy. has been doing knee exercises at home. She is going to touch base with [facility]. She is scheduled to go see them mid-june to discuss surgery to remove the femoral component. She has been wearing her brace for stability. Patient denies any fever, chills, chest pain, shortness of breath or calf pain. There are no new illness or injuries to report since last seen in the office. PHYSICAL EXAMINATION:    Visit Vitals    /79    Pulse 91    Temp 98.4 °F (36.9 °C) (Oral)    Resp 18    Ht 5' 9\" (1.753 m)    Wt 254 lb 12.8 oz (115.6 kg)    SpO2 99%    BMI 37.63 kg/m2     The patient is a well-developed, well-nourished female in no acute distress. The patient is alert and oriented times three. The patient appears to be well groomed. Mood and affect are normal.   ORTHOPEDIC EXAM of Right knee: Inspection: Effusion present,  Incision well healed, small area on the distal aspect of the incision that the brace has rubbed. No erythema, induration, drainage or signs of infection. Walk: with brace on   TTP: none  Range of motion: -3-120 flexion  Stability: Stable   Strength: 5/5  2+ distal pulses      IMPRESSION:  Status post Right failed total knee arthroplasty. femoral component showed signs of loosening but unable to remove during surgery. PLAN:  Incisions cleaned. Staples removed and steri strips applied  Patient is weight bearing as tolerated with the brace on and open to full ROM. Brace to assist with instability. She has a smaller plastic piece then before and this may cause her knee to feel unstable. She will most likely be in the brace for 6 weeks. Will continue outpt physical therapy.  She will continue working on her ROM at home. She will keep the area rubbed by the brace clean and covered. She will clean with peroxide 2-3 times a day. Will refer to [facility] for evaluation and to discuss surgery if needed to remove the femoral component. Patient not given a refill of pain medication. Patient will follow up in 2 weeks.  For ROM check    [facility]

## 2018-06-06 ENCOUNTER — OFFICE VISIT (OUTPATIENT)
Dept: ORTHOPEDIC SURGERY | Facility: CLINIC | Age: 63
End: 2018-06-06

## 2018-06-06 VITALS
RESPIRATION RATE: 18 BRPM | DIASTOLIC BLOOD PRESSURE: 73 MMHG | SYSTOLIC BLOOD PRESSURE: 132 MMHG | HEART RATE: 97 BPM | WEIGHT: 253.6 LBS | HEIGHT: 69 IN | OXYGEN SATURATION: 97 % | BODY MASS INDEX: 37.56 KG/M2 | TEMPERATURE: 97.7 F

## 2018-06-06 DIAGNOSIS — T84.012D FAILED TOTAL RIGHT KNEE REPLACEMENT, SUBSEQUENT ENCOUNTER: Primary | ICD-10-CM

## 2018-06-06 NOTE — PROGRESS NOTES
Patient: Aamir Case  YOB: 1955       HISTORY:  The patient presents for reevaluation of her s/p right failed total knee arthroplasty loosening of femoral component on 5/2/18. Patient is improved, states pain is a 3 out of 10.  she has her first appt friday for outpatient physical therapy. has been doing knee exercises at home. She is doing well despite having not had PT at this point. She says the first appt with them was Friday. Her appt with [facility] is Tuesday next week. She has been wearing her brace for stability. Patient denies any fever, chills, chest pain, shortness of breath or calf pain. There are no new illness or injuries to report since last seen in the office. PHYSICAL EXAMINATION:    Visit Vitals    /73    Pulse 97    Temp 97.7 °F (36.5 °C) (Oral)    Resp 18    Ht 5' 9\" (1.753 m)    Wt 253 lb 9.6 oz (115 kg)    SpO2 97%    BMI 37.45 kg/m2     The patient is a well-developed, well-nourished female in no acute distress. The patient is alert and oriented times three. The patient appears to be well groomed. Mood and affect are normal.   ORTHOPEDIC EXAM of Right knee: Inspection: Effusion present,  Incision well healed  Walk: with brace on   TTP: none  Range of motion: -2-120 flexion  Stability: Stable   Strength: 5/5  2+ distal pulses      IMPRESSION:  Status post Right failed total knee arthroplasty. femoral component showed signs of loosening but unable to remove during surgery. PLAN:  Pt is doing well post operatively  Patient is weight bearing as tolerated with the brace on and open to full ROM. Brace to assist with instability. She has a smaller plastic piece then before and this may cause her knee to feel unstable. She will most likely be in the brace for 6 weeks. Will continue outpt physical therapy. She will continue working on her ROM at home. Will refer to [facility] for evaluation and to discuss surgery if needed to remove the femoral component.  Her appt is on Tuesday of next week. Patient not given a refill of pain medication. Patient will follow up in 2 weeks.  For ROM check and [id number] Didi Purvis, PA-C  [facility]

## 2018-06-07 NOTE — PROGRESS NOTES
[facility]      [address] [facility], [address] [place] [address], [address] [place], [place] [address] [address] [phone] fax    Discharge Summary    Patient name: Armando Adams     Start of Care: 18  Referral source: Norma Hernández MD    : 1955  Medical/Treatment Diagnosis: Pain in right knee [M25.561]  Onset Date: 2017  Prior Hospitalization: see medical history   Provider#: [id number]   Medications: Verified on Patient Summary List    Visits from Start of Care: 1    Missed Visits: 0  Reporting Period : 18 to 18  Comorbidities: B TKA,hearing impaired, HTN, gout   Prior Level of Function: : I all areas of ADLS and activities, working, caring for mom, household activities,community activities                  Summary of Care:Patient seen for eval only. She did not return for any additional sessions. She has exercises for HEP. She should follow up with her MD as needed.    Goal:patient will have established and be I with HEP to aid with progression of skilled PT program  Status at last note/certification:eval  Status at discharge: met    Goal: patient will have pain 6/10 to aid with increase tolerance to ADLS and activities  Status at last note/certification:eval  Status at discharge: not met    Goal:patient will have FOTO improved to 50 so show increase tolerance to ADLS and activities  Status at last note/certification:eval  Status at discharge: not met    Goal:patient will have MMT Right knee F/E 4+ to aid with increase stability for work demands  Status at last note/certification:eval  Status at discharge: not met      ASSESSMENT/RECOMMENDATIONS:  []Discontinue therapy progressing towards or have reached established goals  []Discontinue therapy due to lack of appreciable progress towards goals  [x]Discontinue therapy due to lack of attendance or compliance  [x]Other:no further contact    Thank you for this referral.     Romero Lewis, PT 2018 1:35 PM

## 2018-06-08 ENCOUNTER — HOSPITAL ENCOUNTER (OUTPATIENT)
Dept: PHYSICAL THERAPY | Age: 63
Discharge: HOME | End: 2018-06-08
Payer: COMMERCIAL

## 2018-06-08 PROCEDURE — 97110 THERAPEUTIC EXERCISES: CPT

## 2018-06-08 PROCEDURE — 97140 MANUAL THERAPY 1/> REGIONS: CPT

## 2018-06-08 NOTE — PROGRESS NOTES
PT DAILY TREATMENT NOTE     Patient Name: Marlen Vizcarra  Date:2018  : 1955  [x]  Patient  Verified  Payor: Zaki Christopher / Plan: [place] [employer]  CAPITATED PT / Product Type: Commerical /    In time:5;30  Out time:6:30  Total Treatment Time (min): 60  Visit #:2  of 18    Treatment Area: Pain in right knee [M25.561]    SUBJECTIVE  Pain Level (0-10 scale): 0/10, just a little achy  Any medication changes, allergies to medications, adverse drug reactions, diagnosis change, or new procedure performed?: [x] No    [] Yes (see summary sheet for update)  Subjective functional status/changes:   [] No changes reported  I hate that brace.     OBJECTIVE    Modality rationale: decrease edema, decrease inflammation and decrease pain to improve the patients ability to RETURN TO NORMAL WALKING PROGRAM   Min Type Additional Details    [] Estim:  []Unatt       []IFC  []Premod                        []Other:  []w/ice   []w/heat  Position:  Location:    [] Estim: []Att    []TENS instruct  []NMES                    []Other:  []w/US   []w/ice   []w/heat  Position:  Location:    []  Traction: [] Cervical       []Lumbar                       [] Prone          []Supine                       []Intermittent   []Continuous Lbs:  [] before manual  [] after manual    []  Ultrasound: []Continuous   [] Pulsed                           []1MHz   []3MHz W/cm2:  Location:    []  Iontophoresis with dexamethasone         Location: [] Take home patch   [] In clinic   10 [x]  Ice     []  heat  []  Ice massage  []  Laser   []  Anodyne Position:reclined  Location:R knee    []  Laser with stim  []  Other:  Position:  Location:    []  Vasopneumatic Device Pressure:       [] lo [] med [] hi   Temperature: [] lo [] med [] hi   [x] Skin assessment post-treatment:  [x]intact [x]redness- no adverse reaction    []redness  adverse reaction    50 min Therapeutic Exercise:  [x] See flow sheet :   Rationale: increase ROM to improve the patients ability to walk without the brace and to build strength before the next surgery    10 min Manual Therapy:  STM, patella mobs   Rationale: decrease pain, increase ROM and increase tissue extensibility to return to normal activities          With   [] TE   [] TA   [] neuro   [] other: Patient Education: [x] Review HEP    [] Progressed/Changed HEP based on:   [] positioning   [] body mechanics   [] transfers   [] heat/ice application    [] other:      Other Objective/Functional Measures:  Inflamed R knee with mobilization and increased exercise program.    Pain Level (0-10 scale) post treatment: 0/10    ASSESSMENT/Changes in Function: Pt with slow progress, only 2nd visit of 18. Continue with current POC. Patient will continue to benefit from skilled PT services to address functional mobility deficits to attain remaining goals. [x]  See Plan of Care  []  See progress note/recertification  []  See Discharge Summary         Progress towards goals / Updated goals:  Short Term Goals: To be accomplished in 5 treatments:                         1 patient will have established and be I with HEP to aid with progression of skilled PT program                         EVAL issued                         CURRENT                          2 patient will have FOTO improved to 58 to show increase tolerance to ADLS and activities                         EVAL 53                         CURRENT     Long Term Goals:  To be accomplished in 18 treatments:                         1 patient will have FOTO improved to 64  to show increase tolerance to ADLS and activities                         EVAL 53                         CURRENT                                            2 patient will have pain 0-1/10 to aid with increase tolerance to ADLS and activities                         EVAL 3                         CURRENT                         3 patient will have MMT Right knee F/E 5/5 to aid with increase tolerance to ADLS and activities EVAL 4+ Knee F/E                         CURRENT                         4 patient will report overall 50% improvement to aid with increase tolerance to return to work demands                         [address]  [x]  Upgrade activities as tolerated     [x]  Continue plan of care  []  Update interventions per flow sheet       []  Discharge due to:_  []  Other:_      Jyothi Hemphill 6/8/2018  5:53 PM    Future Appointments  Date Time Provider Kory Smith   8/17/2018 9:30 AM Branden Linda MD [facility] [address]

## 2018-06-22 ENCOUNTER — TELEPHONE (OUTPATIENT)
Dept: ORTHOPEDIC SURGERY | Facility: CLINIC | Age: 63
End: 2018-06-22

## 2018-06-22 NOTE — TELEPHONE ENCOUNTER
Pt(PostOp) is calling to find out if Dr. Karie Tobar will need to see her. Please advise pt at [phone].

## 2018-07-03 ENCOUNTER — OFFICE VISIT (OUTPATIENT)
Dept: ORTHOPEDIC SURGERY | Facility: CLINIC | Age: 63
End: 2018-07-03

## 2018-07-03 VITALS
OXYGEN SATURATION: 98 % | HEIGHT: 69 IN | RESPIRATION RATE: 18 BRPM | DIASTOLIC BLOOD PRESSURE: 80 MMHG | SYSTOLIC BLOOD PRESSURE: 129 MMHG | BODY MASS INDEX: 37.74 KG/M2 | WEIGHT: 254.8 LBS | TEMPERATURE: 98.7 F | HEART RATE: 91 BPM

## 2018-07-03 DIAGNOSIS — G89.29 CHRONIC PAIN OF RIGHT KNEE: ICD-10-CM

## 2018-07-03 DIAGNOSIS — Z96.651 S/P REVISION OF TOTAL KNEE, RIGHT: Primary | ICD-10-CM

## 2018-07-03 DIAGNOSIS — M25.561 CHRONIC PAIN OF RIGHT KNEE: ICD-10-CM

## 2018-07-03 RX ORDER — OXYCODONE AND ACETAMINOPHEN 7.5; 325 MG/1; MG/1
1 TABLET ORAL
Qty: 21 TAB | Refills: 0 | Status: SHIPPED | OUTPATIENT
Start: 2018-07-03 | End: 2018-11-19

## 2018-07-03 RX ORDER — OXYCODONE AND ACETAMINOPHEN 10; 325 MG/1; MG/1
1 TABLET ORAL
Qty: 21 TAB | Refills: 0 | Status: CANCELLED | OUTPATIENT
Start: 2018-07-03

## 2018-07-03 NOTE — PROGRESS NOTES
Patient: Lesa Current                MRN: [id number]       SSN: [id number]  YOB: 1955        AGE: 65 y.o. SEX: female  Body mass index is 37.63 kg/(m^2). PCP: Josh Yusuf MD  07/03/18    HISTORY: I saw Ms. Destiny Barlow. She is actually doing shockingly well after her knee revision. She had an unremovable femoral component without osteotomy, as the bone had ingrown to where the stem narrows and then gets wide again. Prior to her surgery, she only bent her knee to about 85°. She is bending to 120° at this point. The quadriceps are intact. She can maintain a full straight leg raise. She is missing about 3° of extension. She has a little bit of weakness involving the quadriceps. She is wearing a brace for mid-flexion stability, and she [place] [facility] and they are going to follow her up in one month. I suspect that once things are fully healed up, she will be back for another revision surgery. She will likely require an osteotomy of the femur to get this implant out due to the remodeling. PHYSICAL EXAMINATION:  On examination today, she is actually a very thankful patient. Her knee moves much better than before. Her pain is down to a 3/10, and she denies having start-up pain currently. We did re-glue the component on at the time of surgery. The wound is healing nicely, and there is no evidence for infection or DVT. PLAN:  I am actually very pleased with her progress. She is doing surprisingly well. She is actually very pleased as well. She has not been able to move her knee like this really since the initial surgery done by another physician. I am going to keep a close eye on her. She would like to return to work. I would like her to have a few more weeks off, and she is going to keep the leg elevated a little bit more as well. A pain medication prescription is issued today. We will see her back in about three or four weeks for followup assessment. REVIEW OF SYSTEMS:      CON: negative for weight loss, fever  EYE: negative for double vision  ENT: negative for hoarseness  RS:   negative for Tb  GI:    negative for blood in stool  :  negative for blood in urine  Other systems reviewed and noted below. Past Medical History:   Diagnosis Date    Arthritis     Elevated cholesterol     Hypertension     Joint pain     knees       Family History   Problem Relation Age of Onset    Diabetes Mother     Colon Polyps Mother     Dementia Mother     Heart Disease Father     Hypertension Brother        Current Outpatient Prescriptions   Medication Sig Dispense Refill    furosemide (LASIX) 20 mg tablet Take 1 Tab by mouth daily as needed. Indications: swelling 30 Tab 1    ferrous sulfate 325 mg (65 mg iron) tablet TAKE 1 TABLET BY MOUTH TWICE A DAY WITH MEALS 60 Tab 3    fexofenadine (ALLEGRA) 180 mg tablet TAKE 1 TABLET BY MOUTH DAILY 90 Tab 3    allopurinol (ZYLOPRIM) 100 mg tablet Take 1 Tab by mouth two (2) times a day. 60 Tab 1    morinda citrifolia fruit (ELIO) 250 mg cap Take 1 Cap by mouth daily.  celecoxib (CELEBREX) 200 mg capsule Take 1 Cap by mouth two (2) times a day for 90 days. 60 Cap 2    oxyCODONE-acetaminophen (PERCOCET)  mg per tablet Take 1-2 Tabs by mouth every four (4) hours as needed for Pain. Max Daily Amount: 12 Tabs. 60 Tab 0    citalopram (CELEXA) 20 mg tablet TAKE 1 TAB BY MOUTH DAILY. 3    amLODIPine (NORVASC) 10 mg tablet TAKE 1 TABLET BY MOUTH DAILY 90 Tab 3    atorvastatin (LIPITOR) 20 mg tablet TAKE 1 TABLET BY MOUTH DAILY 90 Tab 3    losartan-hydroCHLOROthiazide (HYZAAR) 100-25 mg per tablet TAKE 1 TABLET BY MOUTH DAILY. 90 Tab 3    tiZANidine (ZANAFLEX) 4 mg tablet TAKE 1 TAB BY MOUTH THREE (3) TIMES DAILY AS NEEDED. 90 Tab 0    Comp. Stocking,Knee,Regular,Lrg misc Use daily and take off at night, 20mmHg 1 Packet 0    polyethylene glycol (MIRALAX) 17 gram/dose powder Take as directed by office (Patient taking differently: as needed. Take as directed by office) 255 g 0    MULTIVIT-MIN/FA/CALCIUM/VIT K1 (ONE-A-DAY WOMEN'S 50+ PO) Take 1 Tab by mouth daily.  Magnesium Oxide 500 mg cap Take 400 mg by mouth daily.  oxyCODONE-acetaminophen (PERCOCET)  mg per tablet Take 1 Tab by mouth every eight (8) hours as needed for Pain. Max Daily Amount: 3 Tabs. 40 Tab 0    buffered aspirin (BUFFERIN) 325 mg tablet Take 1 Tab by mouth two (2) times a day. 61 Tab 0       No Known Allergies    Past Surgical History:   Procedure Laterality Date    HX HYSTERECTOMY      HX KNEE REPLACEMENT      HX ORTHOPAEDIC Right Feb, 2016    TKR    HX TONSILLECTOMY         Social History     Social History    Marital status: SINGLE     Spouse name: N/A    Number of children: N/A    Years of education: N/A     Occupational History    Not on file. Social History Main Topics    Smoking status: Never Smoker    Smokeless tobacco: Never Used    Alcohol use No    Drug use: No    Sexual activity: Yes     Partners: Male     Birth control/ protection: None     Other Topics Concern     Service No    Blood Transfusions No    Caffeine Concern Yes    Occupational Exposure No    Hobby Hazards No    Sleep Concern No    Stress Concern No    Weight Concern No    Special Diet No    Back Care No    Exercise No    Bike Helmet No    Seat Belt Yes    Self-Exams Yes     Social History Narrative       Visit Vitals    /80    Pulse 91    Temp 98.7 °F (37.1 °C) (Oral)    Resp 18    Ht 5' 9\" (1.753 m)    Wt 254 lb 12.8 oz (115.6 kg)    SpO2 98%    BMI 37.63 kg/m2         PHYSICAL EXAMINATION:  GENERAL: Alert and oriented x3, in no acute distress, well-developed, well-nourished, afebrile. HEART: No JVD. EYES: No scleral icterus   NECK: No significant lymphadenopathy   LUNGS: No respiratory compromise or indrawing  ABDOMEN: Soft, non-tender, non-distended.            Electronically signed by: Jordana Nunez MD

## 2018-07-30 NOTE — PROGRESS NOTES
In Lisseth Maura Ksawerego [address] [place] [address], [address] [place], [address] [place] [address],[facility] 
[phone] fax Discharge Summary Patient name: Tran Toledo     Start of Care:18 Referral source: Belle Carrera [facility]    : 1955 Medical/Treatment Diagnosis: Pain in right knee [M25.561]  Onset Date:2017 with post op 18 Prior Hospitalization: see medical history   Provider#: [id number] Comorbidities: arthritis, HTN, hearing impaired Prior Level of Function: : I all areas of ADLs and activities, no AD, working, household chores, community 
  Medications: Verified on Patient Summary List 
 
Visits from Start of Care: 2    Missed Visits: 0 Reporting Period : 18  to 18 Summary of Care:Patient seen for 2 sessions. Her residual pain was \"sore\". FOTO was not reassessed. She has exercises for her HEP. She should follow up with her MD as needed. Thank you. Goal:patient will have established and be I with HEP to aid with progression of skilled PT program          
Status at last note/certification:eval 
Status at discharge: met Goal:patient will have FOTO improved to 58 to show increase tolerance to ADLS and activities Status at last note/certification:eval 
Status at discharge: not met Goal: patient will have pain 0-1/10 to aid with increase tolerance to ADLS and activities Status at last note/certification:eval 
Status at discharge: met Goal: patient will report overall 50% improvement to aid with increase tolerance to return to work demands Status at last note/certification:eval 
Status at discharge: not met ASSESSMENT/RECOMMENDATIONS: 
[]Discontinue therapy progressing towards or have reached established goals []Discontinue therapy due to lack of appreciable progress towards goals [x]Discontinue therapy due to lack of attendance or compliance [x]Other:no further contact Thank you for this referral.  
 
Antonio Lyn, PT 7/30/2018 6:26 PM

## 2018-08-01 ENCOUNTER — OFFICE VISIT (OUTPATIENT)
Dept: ORTHOPEDIC SURGERY | Facility: CLINIC | Age: 63
End: 2018-08-01

## 2018-08-01 VITALS
TEMPERATURE: 97.9 F | SYSTOLIC BLOOD PRESSURE: 146 MMHG | OXYGEN SATURATION: 99 % | HEART RATE: 90 BPM | WEIGHT: 260 LBS | HEIGHT: 69 IN | DIASTOLIC BLOOD PRESSURE: 89 MMHG | BODY MASS INDEX: 38.51 KG/M2 | RESPIRATION RATE: 18 BRPM

## 2018-08-01 DIAGNOSIS — Z96.651 S/P REVISION OF TOTAL KNEE, RIGHT: Primary | ICD-10-CM

## 2018-08-01 RX ORDER — CELECOXIB 200 MG/1
200 CAPSULE ORAL 2 TIMES DAILY WITH MEALS
Qty: 60 CAP | Refills: 2 | Status: SHIPPED | OUTPATIENT
Start: 2018-08-01 | End: 2018-11-04 | Stop reason: SDUPTHER

## 2018-08-01 NOTE — LETTER
NOTIFICATION RETURN TO WORK / SCHOOL 
 
8/1/2018 10:43 AM 
 
Ms. Souleymane Landrum [facility] [address] To Whom It May Concern: 
 
Souleymane Landrum is currently under the care of [address] [place] [address]. She will return to work on 8/6/18. Please allow to ice and elevate. If there are questions or concerns please have the patient contact our office.  
 
 
 
Sincerely, 
 
 
Olivia Hanson PA-C

## 2018-08-01 NOTE — PROGRESS NOTES
Patient: Lesa Current YOB: 1955 HISTORY:  The patient presents for reevaluation of her s/p right failed total knee arthroplasty loosening of femoral component on 5/2/18. Patient is improved, states pain is a 2 out of 10.  she has finished outpatient physical therapy. has been doing knee exercises at home. She has established care at [facility] and she will follow up with them if and when she starts having problems again with her right knee. She is doing very well and very pleased with the results of her revision in May. She has been wearing her brace for stability. Patient denies any fever, chills, chest pain, shortness of breath or calf pain. There are no new illness or injuries to report since last seen in the office. PHYSICAL EXAMINATION:   
Visit Vitals  /89  Pulse 90  Temp 97.9 °F (36.6 °C) (Oral)  Resp 18  Ht 5' 9\" (1.753 m)  Wt 260 lb (117.9 kg)  SpO2 99%  BMI 38.4 kg/m2 The patient is a well-developed, well-nourished female in no acute distress. The patient is alert and oriented times three. The patient appears to be well groomed. Mood and affect are normal.  
ORTHOPEDIC EXAM of Right knee: Inspection: Effusion not present,  Incision well healed Walk: with no assistive device, wears brace for stability TTP: none Range of motion: 0-120 flexion Stability: Stable Strength: 5/5 
2+ distal pulses IMPRESSION:  Status post Right failed total knee arthroplasty. femoral component showed signs of loosening but unable to remove during surgery. PLAN: 
Pt is doing well post operatively Patient is weight bearing as tolerated. Brace to assist with instability and as needed at work. She has finished outpt physical therapy. She will continue her exercises at home. Talked about the referral to [facility] was to establish care and when her knee starts giving her problems again she will be able to have them revise it.  
Patient not given a refill of pain medication. Pt given an rx for Celebrex to help with discomfort. She is able to return to work. She was given a note today in the office. Patient will follow up in 3-4 months. For eval and xrays [phone]

## 2018-08-13 DIAGNOSIS — M79.89 LEG SWELLING: ICD-10-CM

## 2018-08-13 RX ORDER — FUROSEMIDE 20 MG/1
TABLET ORAL
Qty: 30 TAB | Refills: 1 | Status: SHIPPED | OUTPATIENT
Start: 2018-08-13 | End: 2018-10-08 | Stop reason: SDUPTHER

## 2018-08-17 ENCOUNTER — OFFICE VISIT (OUTPATIENT)
Dept: FAMILY MEDICINE CLINIC | Facility: CLINIC | Age: 63
End: 2018-08-17

## 2018-08-17 VITALS
WEIGHT: 263 LBS | OXYGEN SATURATION: 97 % | HEIGHT: 69 IN | TEMPERATURE: 97.4 F | BODY MASS INDEX: 38.95 KG/M2 | SYSTOLIC BLOOD PRESSURE: 146 MMHG | RESPIRATION RATE: 18 BRPM | DIASTOLIC BLOOD PRESSURE: 87 MMHG | HEART RATE: 97 BPM

## 2018-08-17 DIAGNOSIS — Z12.11 COLON CANCER SCREENING: ICD-10-CM

## 2018-08-17 DIAGNOSIS — R73.02 IMPAIRED GLUCOSE TOLERANCE: ICD-10-CM

## 2018-08-17 DIAGNOSIS — E78.5 HYPERLIPIDEMIA, UNSPECIFIED HYPERLIPIDEMIA TYPE: ICD-10-CM

## 2018-08-17 DIAGNOSIS — E66.01 SEVERE OBESITY (BMI 35.0-39.9) WITH COMORBIDITY (HCC): ICD-10-CM

## 2018-08-17 DIAGNOSIS — Z23 NEED FOR SHINGLES VACCINE: ICD-10-CM

## 2018-08-17 DIAGNOSIS — M1A.9XX0 CHRONIC GOUT WITHOUT TOPHUS, UNSPECIFIED CAUSE, UNSPECIFIED SITE: ICD-10-CM

## 2018-08-17 DIAGNOSIS — I10 ESSENTIAL HYPERTENSION: Primary | ICD-10-CM

## 2018-08-17 DIAGNOSIS — M17.11 PRIMARY OSTEOARTHRITIS OF RIGHT KNEE: ICD-10-CM

## 2018-08-17 DIAGNOSIS — M1A.09X0 CHRONIC GOUT OF MULTIPLE SITES, UNSPECIFIED CAUSE: ICD-10-CM

## 2018-08-17 NOTE — PROGRESS NOTES
Chief Complaint   Patient presents with    Diabetes    Hypertension       Pt preferred language for health care discussion is [place].     Is someone accompanying this pt? no    Is the patient using any DME equipment during OV? no    Pt currently taking Antiplatelet therapy? no    Depression Screening:  PHQ over the last two weeks 8/17/2018 8/1/2018 7/3/2018 6/6/2018 5/30/2018 5/17/2018 5/10/2018   PHQ Not Done - Patient Decline Active Diagnosis of Depression or Bipolar Disorder Patient Decline Active Diagnosis of Depression or Bipolar Disorder - -   Little interest or pleasure in doing things Not at all - Not at all - - Not at all Not at all   Feeling down, depressed, irritable, or hopeless Not at all - Not at all - - Not at all Not at all   Total Score PHQ 2 0 - 0 - - 0 0   Trouble falling or staying asleep, or sleeping too much - - - - - - -   Feeling tired or having little energy - - - - - - -   Poor appetite, weight loss, or overeating - - - - - - -   Feeling bad about yourself - or that you are a failure or have let yourself or your family down - - - - - - -   Trouble concentrating on things such as school, work, reading, or watching TV - - - - - - -   Moving or speaking so slowly that other people could have noticed; or the opposite being so fidgety that others notice - - - - - - -   Thoughts of being better off dead, or hurting yourself in some way - - - - - - -   PHQ 9 Score - - - - - - -   How difficult have these problems made it for you to do your work, take care of your home and get along with others - - - - - - -       Learning Assessment:  Learning Assessment 3/22/2018 3/5/2018 1/22/2016 5/20/2015   PRIMARY LEARNER Patient Patient Patient Patient   HIGHEST LEVEL OF EDUCATION - PRIMARY LEARNER  - - - GRADUATED HIGH SCHOOL OR GED   BARRIERS PRIMARY LEARNER - - NONE NONE   CO-LEARNER CAREGIVER - - - No   PRIMARY LANGUAGE ENGLISH ENGLISH ENGLISH ENGLISH   LEARNER PREFERENCE PRIMARY DEMONSTRATION LISTENING READING READING   ANSWERED BY patient patient patient `patient   RELATIONSHIP SELF SELF SELF SELF       Abuse Screening:  Abuse Screening Questionnaire 8/17/2018   Do you ever feel afraid of your partner? N   Are you in a relationship with someone who physically or mentally threatens you? N   Is it safe for you to go home? Y       Health Maintenance reviewed, discussed with patient and ordered per Provider. Health Maintenance Due   Topic Date Due    FOBT Q 1 YEAR AGE 50-75  07/23/2006    ZOSTER VACCINE AGE 60>  05/23/2016    Influenza Age 9 to Adult  08/01/2018       Any and all required MEKHI forms filled out by patient and faxed, confirmation received. Coordination of Care:  1. Have you been to the ER, urgent care clinic since your last visit? Hospitalized in the last 30 days? no    2. Have you seen or consulted any other health care providers outside of the [address] [place] [address] in the last 30 days? Include any pap smears or colon screening.  no

## 2018-08-17 NOTE — PROGRESS NOTES
HISTORY OF PRESENT ILLNESS  Torrey Davis is a 59 y.o. female. HPI Comments: Seen in follow-up for HTN, prediabetes, hyperlipidemia, DJD in multiple joints, seasonal allergic rhinitis, gout, pedal edema, depression. No problems with medications. She had revision surgery on her right knee in May (doing well post-op). She hasn't had any colon cancer screening. She is amenable to Shingrix. Hypertension    Pertinent negatives include no chest pain, no palpitations, no headaches, no shortness of breath, no nausea and no vomiting. Past Medical History:   Diagnosis Date    Arthritis     Elevated cholesterol     Hypertension     Joint pain     knees       Past Surgical History:   Procedure Laterality Date    HX HYSTERECTOMY      HX KNEE REPLACEMENT      HX ORTHOPAEDIC Right Feb, 2016    TKR    HX TONSILLECTOMY         History   Smoking Status    Never Smoker   Smokeless Tobacco    Never Used     Current Outpatient Prescriptions   Medication Sig    furosemide (LASIX) 20 mg tablet TAKE 1 TAB BY MOUTH DAILY AS NEEDED. INDICATIONS: SWELLING    celecoxib (CELEBREX) 200 mg capsule Take 1 Cap by mouth two (2) times daily (with meals).  allopurinol (ZYLOPRIM) 100 mg tablet Take 1 Tab by mouth two (2) times a day.  oxyCODONE-acetaminophen (PERCOCET) 7.5-325 mg per tablet Take 1 Tab by mouth every eight (8) hours as needed for Pain (Hold if sedated. ). Max Daily Amount: 3 Tabs.  ferrous sulfate 325 mg (65 mg iron) tablet TAKE 1 TABLET BY MOUTH TWICE A DAY WITH MEALS    fexofenadine (ALLEGRA) 180 mg tablet TAKE 1 TABLET BY MOUTH DAILY    morinda citrifolia fruit (ELIO) 250 mg cap Take 1 Cap by mouth daily.  buffered aspirin (BUFFERIN) 325 mg tablet Take 1 Tab by mouth two (2) times a day.     amLODIPine (NORVASC) 10 mg tablet TAKE 1 TABLET BY MOUTH DAILY    atorvastatin (LIPITOR) 20 mg tablet TAKE 1 TABLET BY MOUTH DAILY    losartan-hydroCHLOROthiazide (HYZAAR) 100-25 mg per tablet TAKE 1 TABLET BY MOUTH DAILY.  Comp. Stocking,Knee,Regular,Lrg misc Use daily and take off at night, 20mmHg    polyethylene glycol (MIRALAX) 17 gram/dose powder Take as directed by office (Patient taking differently: as needed. Take as directed by office)    MULTIVIT-MIN/FA/CALCIUM/VIT K1 (ONE-A-DAY WOMEN'S 50+ PO) Take 1 Tab by mouth daily.  Magnesium Oxide 500 mg cap Take 400 mg by mouth daily. No current facility-administered medications for this visit. Review of Systems   Constitutional: Negative for chills and fever. Respiratory: Negative for shortness of breath. Cardiovascular: Positive for leg swelling. Negative for chest pain and palpitations. Gastrointestinal: Negative for nausea and vomiting. Neurological: Negative for tingling, focal weakness and headaches. Psychiatric/Behavioral: The patient does not have insomnia. Visit Vitals    /87    Pulse 97    Temp 97.4 °F (36.3 °C) (Oral)    Resp 18    Ht 5' 9\" (1.753 m)    Wt 263 lb (119.3 kg)    SpO2 97%    BMI 38.84 kg/m2       Physical Exam   Constitutional: She is oriented to person, place, and time. She appears well-developed and well-nourished. No distress. Neck: Neck supple. No thyromegaly present. Carotid bruit absent   Cardiovascular: Normal rate, regular rhythm and intact distal pulses. Exam reveals no gallop and no friction rub. No murmur heard. Pulmonary/Chest: Effort normal and breath sounds normal. No respiratory distress. Musculoskeletal: She exhibits edema. Lymphadenopathy:     She has no cervical adenopathy. Neurological: She is alert and oriented to person, place, and time. Skin: Skin is warm and dry. Psychiatric: She has a normal mood and affect. Her behavior is normal. Judgment and thought content normal.   Nursing note and vitals reviewed. ASSESSMENT and PLAN    ICD-10-CM ICD-9-CM    1. Essential hypertension I10 401.9    2. Impaired glucose tolerance R73.02 790.22    3. Hyperlipidemia, unspecified hyperlipidemia type E78.5 272.4 LIPID PANEL      METABOLIC PANEL, COMPREHENSIVE   4. Severe obesity (BMI 35.0-39. 9) with comorbidity ([facility] [address] E66.01 278.01    5. Primary osteoarthritis of right knee M17.11 715.16    6. Chronic gout without tophus, unspecified cause, unspecified site M1A. 9XX0 274.02 URIC ACID   7. Colon cancer screening Z12.11 V76.51 OCCULT BLOOD IMMUNOASSAY,DIAGNOSTIC   8. Need for shingles vaccine Z23 V04.89 varicella-zoster recombinant, PF, (SHINGRIX) 50 mcg/0.5 mL susr injection     Follow-up Disposition:  Return in about 3 months (around 11/17/2018). current treatment plan is effective, no change in therapy  lab results and schedule of future lab studies reviewed with patient - including FIT testing  Given prescription for Shingrix. reviewed diet, exercise and weight control  reviewed medications and side effects in detail  Plan of care reviewed - patient verbalize(s) understanding and agreement.

## 2018-08-17 NOTE — MR AVS SNAPSHOT
[facility] 
 
 
 [address] [facility] [address] [facility] [address] 
[phone] Patient: Neetu Ibrahim MRN: [id number] ZQC:[id number] Visit Information Date & Time Provider Department Dept. Phone Encounter #  
 8/17/2018  9:30 AM Kandis Dumont MD [facility] [phone] Follow-up Instructions Return in about 3 months (around 11/17/2018). Your Appointments 11/8/2018  8:45 AM  
Follow Up with Queta Gambino MD  
[facility] [address] [place] [address]) Appt Note: 3 M [address], [address] [facility] [address]  
[phone]  
  
   
 [address] [facility], [address] [place] [id number] Upcoming Health Maintenance Date Due FOBT Q 1 YEAR AGE 50-75 7/23/2006 ZOSTER VACCINE AGE 60> 5/23/2016 Influenza Age 5 to Adult 8/1/2018 BREAST CANCER SCRN MAMMOGRAM 4/19/2020 DTaP/Tdap/Td series (2 - Td) 10/31/2022 Allergies as of 8/17/2018  Review Complete On: 8/17/2018 By: Kandis Dumont MD  
 No Known Allergies Current Immunizations  Reviewed on 10/12/2016 Name Date Influenza Vaccine (Quad) PF 11/13/2017, 10/12/2016 10:52 AM, 2/1/2016 10:37 AM  
 Influenza Vaccine PF 12/11/2013 TDAP Vaccine 10/31/2012 12:56 AM  
  
 Not reviewed this visit You Were Diagnosed With   
  
 Codes Comments Essential hypertension    -  Primary ICD-10-CM: I10 
ICD-9-CM: 401.9 Impaired glucose tolerance     ICD-10-CM: R73.02 
ICD-9-CM: 790.22 Hyperlipidemia, unspecified hyperlipidemia type     ICD-10-CM: E78.5 ICD-9-CM: 272.4 Severe obesity (BMI 35.0-39. 9) with comorbidity ([facility] [address]     ICD-10-CM: E66.01 
ICD-9-CM: 278.01 Primary osteoarthritis of right knee     ICD-10-CM: M17.11 ICD-9-CM: 715.16 Chronic gout without tophus, unspecified cause, unspecified site     ICD-10-CM: M1A. 9XX0 
ICD-9-CM: 274.02   
 Colon cancer screening     ICD-10-CM: Z12.11 ICD-9-CM: V76.51 Need for shingles vaccine     ICD-10-CM: M69 ICD-9-CM: V04.89 Vitals BP Pulse Temp Resp Height(growth percentile) Weight(growth percentile) 146/87 97 97.4 °F (36.3 °C) (Oral) 18 5' 9\" (1.753 m) 263 lb (119.3 kg) SpO2 BMI OB Status Smoking Status 97% 38.84 kg/m2 Hysterectomy Never Smoker BMI and BSA Data Body Mass Index Body Surface Area  
 38.84 kg/m 2 2.41 m 2 Preferred Pharmacy Pharmacy Name Phone [id number] [phone] Your Updated Medication List  
  
   
This list is accurate as of 8/17/18 10:36 AM.  Always use your most recent med list.  
  
  
  
  
 allopurinol 100 mg tablet Commonly known as:  Lucia Fleischer Take 1 Tab by mouth two (2) times a day. amLODIPine 10 mg tablet Commonly known as:  Tu [place] TAKE 1 TABLET BY MOUTH DAILY  
  
 atorvastatin 20 mg tablet Commonly known as:  LIPITOR  
TAKE 1 TABLET BY MOUTH DAILY  
  
 buffered aspirin 325 mg tablet Commonly known as:  BUFFERIN Take 1 Tab by mouth two (2) times a day. celecoxib 200 mg capsule Commonly known as:  CeleBREX Take 1 Cap by mouth two (2) times daily (with meals). Comp. [address] Use daily and take off at night, 20mmHg  
  
 ferrous sulfate 325 mg (65 mg iron) tablet TAKE 1 TABLET BY MOUTH TWICE A DAY WITH MEALS  
  
 fexofenadine 180 mg tablet Commonly known as:  Alroy Jimmy TAKE 1 TABLET BY MOUTH DAILY  
  
 furosemide 20 mg tablet Commonly known as:  LASIX TAKE 1 TAB BY MOUTH DAILY AS NEEDED. INDICATIONS: SWELLING  
  
 losartan-hydroCHLOROthiazide 100-25 mg per tablet Commonly known as:  HYZAAR  
TAKE 1 TABLET BY MOUTH DAILY. Magnesium Oxide 500 mg Cap Take 400 mg by mouth daily. ELIO 250 mg Cap Generic drug:  morinda citrifolia fruit Take 1 Cap by mouth daily. ONE-A-DAY WOMEN'S 50+ PO Take 1 Tab by mouth daily. oxyCODONE-acetaminophen 7.5-325 mg per tablet Commonly known as:  PERCOCET Take 1 Tab by mouth every eight (8) hours as needed for Pain (Hold if sedated. ). Max Daily Amount: 3 Tabs. polyethylene glycol 17 gram/dose powder Commonly known as:  Barrera Graciela Take as directed by office  
  
 varicella-zoster recombinant (PF) 50 mcg/0.5 mL Susr injection Commonly known as:  SHINGRIX  
0.5 mL by IntraMUSCular route once for 1 dose. Indications: PREVENTION OF HERPES ZOSTER Prescriptions Printed Refills  
 varicella-zoster recombinant, PF, (SHINGRIX) 50 mcg/0.5 mL susr injection 1 Si.5 mL by IntraMUSCular route once for 1 dose. Indications: PREVENTION OF HERPES ZOSTER Class: Print Route: IntraMUSCular We Performed the Following LIPID PANEL [38808 CPT(R)] METABOLIC PANEL, COMPREHENSIVE [37192 CPT(R)] URIC ACID T2421938 CPT(R)] Follow-up Instructions Return in about 3 months (around 2018). To-Do List   
 2018 Lab:  OCCULT BLOOD IMMUNOASSAY,DIAGNOSTIC Introducing [facility]! Ollie Wright introduces BuyerCurious patient portal. Now you can access parts of your medical record, email your doctor's office, and request medication refills online. 1. In your internet browser, go to [email or web address] 2. Click on the First Time User? Click Here link in the Sign In box. You will see the New Member Sign Up page. 3. Enter your BuyerCurious Access Code exactly as it appears below. You will not need to use this code after youve completed the sign-up process. If you do not sign up before the expiration date, you must request a new code. · BuyerCurious Access Code: [email or web address] Expires: 2018 10:15 AM 
 
4. Enter the last four digits of your Social Security Number (xxxx) and Date of Birth (mm/dd/yyyy) as indicated and click Submit.  You will be taken to the next sign-up page. 5. Create a What They Like ID. This will be your What They Like login ID and cannot be changed, so think of one that is secure and easy to remember. 6. Create a What They Like password. You can change your password at any time. 7. Enter your Password Reset Question and Answer. This can be used at a later time if you forget your password. 8. Enter your e-mail address. You will receive e-mail notification when new information is available in [address] 9. Click Sign Up. You can now view and download portions of your medical record. 10. Click the Download Summary menu link to download a portable copy of your medical information. If you have questions, please visit the Frequently Asked Questions section of the What They Like website. Remember, What They Like is NOT to be used for urgent needs. For medical emergencies, dial 911. Now available from your iPhone and Android! Please provide this summary of care documentation to your next provider. Your primary care clinician is listed as Storm Vazquez. If you have any questions after today's visit, please call [phone].

## 2018-08-20 ENCOUNTER — HOSPITAL ENCOUNTER (OUTPATIENT)
Dept: LAB | Age: 63
Discharge: HOME | End: 2018-08-20

## 2018-08-20 LAB — SENTARA SPECIMEN COL,SENBCF: NORMAL

## 2018-08-20 PROCEDURE — 99001 SPECIMEN HANDLING PT-LAB: CPT | Performed by: FAMILY MEDICINE

## 2018-08-21 LAB
A-G RATIO,AGRAT: 1.4 RATIO (ref 1.1–2.6)
ALBUMIN SERPL-MCNC: 4.2 G/DL (ref 3.5–5)
ALP SERPL-CCNC: 129 U/L (ref 40–120)
ALT SERPL-CCNC: 17 U/L (ref 5–40)
ANION GAP SERPL CALC-SCNC: 16 MMOL/L
AST SERPL W P-5'-P-CCNC: 23 U/L (ref 10–37)
BILIRUB SERPL-MCNC: 0.3 MG/DL (ref 0.2–1.2)
BUN SERPL-MCNC: 11 MG/DL (ref 6–22)
CALCIUM SERPL-MCNC: 9.4 MG/DL (ref 8.4–10.5)
CHLORIDE SERPL-SCNC: 97 MMOL/L (ref 98–110)
CHOLEST SERPL-MCNC: 123 MG/DL (ref 110–200)
CO2 SERPL-SCNC: 27 MMOL/L (ref 20–32)
CREAT SERPL-MCNC: 0.6 MG/DL (ref 0.8–1.4)
GFRAA, 66117: >60
GFRNA, 66118: >60
GLOBULIN,GLOB: 3.1 G/DL (ref 2–4)
GLUCOSE SERPL-MCNC: 104 MG/DL (ref 70–99)
HDLC SERPL-MCNC: 2 MG/DL (ref 0–5)
HDLC SERPL-MCNC: 61 MG/DL (ref 40–59)
HEMOCCULT STL QL IA: POSITIVE
LDLC SERPL CALC-MCNC: 50 MG/DL (ref 50–99)
POTASSIUM SERPL-SCNC: 3.9 MMOL/L (ref 3.5–5.5)
PROT SERPL-MCNC: 7.3 G/DL (ref 6.2–8.1)
SODIUM SERPL-SCNC: 140 MMOL/L (ref 133–145)
TRIGL SERPL-MCNC: 59 MG/DL (ref 40–149)
URATE SERPL-MCNC: 7.3 MG/DL (ref 2.2–7.7)
VLDLC SERPL CALC-MCNC: 12 MG/DL (ref 8–30)

## 2018-08-28 RX ORDER — ALLOPURINOL 300 MG/1
300 TABLET ORAL DAILY
Qty: 90 TAB | Refills: 3 | Status: SHIPPED | OUTPATIENT
Start: 2018-08-28 | End: 2019-07-02 | Stop reason: SDUPTHER

## 2018-08-29 NOTE — PROGRESS NOTES
Please notify patient that results are normal except that her uric acid is not down quite far enough. Will increase Allopurinol to 300mg daily (sent to pharmacy). (Also positive FIT test in separate result note - referral for colonoscopy written).

## 2018-09-12 NOTE — PROGRESS NOTES
Spoke with patient, confirmed name and . Advised patient of below message, per Dr. Ana Castillo. Patient verbalized understanding. Be advised

## 2018-11-04 RX ORDER — CELECOXIB 200 MG/1
200 CAPSULE ORAL 2 TIMES DAILY WITH MEALS
Qty: 60 CAP | Refills: 2 | Status: SHIPPED | OUTPATIENT
Start: 2018-11-04 | End: 2019-01-24 | Stop reason: SDUPTHER

## 2018-11-19 ENCOUNTER — OFFICE VISIT (OUTPATIENT)
Dept: FAMILY MEDICINE CLINIC | Facility: CLINIC | Age: 63
End: 2018-11-19

## 2018-11-19 VITALS
HEIGHT: 69 IN | DIASTOLIC BLOOD PRESSURE: 67 MMHG | TEMPERATURE: 97.4 F | OXYGEN SATURATION: 93 % | SYSTOLIC BLOOD PRESSURE: 125 MMHG | RESPIRATION RATE: 18 BRPM | HEART RATE: 87 BPM | WEIGHT: 269 LBS | BODY MASS INDEX: 39.84 KG/M2

## 2018-11-19 DIAGNOSIS — M10.9 GOUT INVOLVING TOE, UNSPECIFIED CAUSE, UNSPECIFIED CHRONICITY, UNSPECIFIED LATERALITY: ICD-10-CM

## 2018-11-19 DIAGNOSIS — E78.5 HYPERLIPIDEMIA, UNSPECIFIED HYPERLIPIDEMIA TYPE: ICD-10-CM

## 2018-11-19 DIAGNOSIS — I10 ESSENTIAL HYPERTENSION: Primary | ICD-10-CM

## 2018-11-19 DIAGNOSIS — M17.11 PRIMARY OSTEOARTHRITIS OF RIGHT KNEE: ICD-10-CM

## 2018-11-19 DIAGNOSIS — Z23 ENCOUNTER FOR IMMUNIZATION: ICD-10-CM

## 2018-11-19 DIAGNOSIS — E66.01 SEVERE OBESITY (BMI 35.0-39.9) WITH COMORBIDITY (HCC): ICD-10-CM

## 2018-11-19 DIAGNOSIS — R73.02 IMPAIRED GLUCOSE TOLERANCE: ICD-10-CM

## 2018-11-19 NOTE — PATIENT INSTRUCTIONS
Vaccine Information Statement Influenza (Flu) Vaccine (Inactivated or Recombinant): What you need to know Many Vaccine Information Statements are available in Sami and other languages. See www.immunize.org/vis Hojas de Información Sobre Vacunas están disponibles en Español y en muchos otros idiomas. Visite www.immunize.org/vis 1. Why get vaccinated? Influenza (flu) is a contagious disease that spreads around the [place] every year, usually between October and May. Flu is caused by influenza viruses, and is spread mainly by coughing, sneezing, and close contact. Anyone can get flu. Flu strikes suddenly and can last several days. Symptoms vary by age, but can include: 
 fever/chills  sore throat  muscle aches  fatigue  cough  headache  runny or stuffy nose Flu can also lead to pneumonia and blood infections, and cause diarrhea and seizures in children. If you have a medical condition, such as heart or lung disease, flu can make it worse. Flu is more dangerous for some people. Infants and young children, people 72years of age and older, pregnant women, and people with certain health conditions or a weakened immune system are at greatest risk. Each year thousands of people in the [facility] die from flu, and many more are hospitalized. Flu vaccine can: 
 keep you from getting flu, 
 make flu less severe if you do get it, and 
 keep you from spreading flu to your family and other people. 2. Inactivated and recombinant flu vaccines A dose of flu vaccine is recommended every flu season. Children 6 months through 6years of age may need two doses during the same flu season. Everyone else needs only one dose each flu season.   
 
 
Some inactivated flu vaccines contain a very small amount of a mercury-based preservative called thimerosal. Studies have not shown thimerosal in vaccines to be harmful, but flu vaccines that do not contain thimerosal are available. There is no live flu virus in flu shots. They cannot cause the flu. There are many flu viruses, and they are always changing. Each year a new flu vaccine is made to protect against three or four viruses that are likely to cause disease in the upcoming flu season. But even when the vaccine doesnt exactly match these viruses, it may still provide some protection Flu vaccine cannot prevent: 
 flu that is caused by a virus not covered by the vaccine, or 
 illnesses that look like flu but are not. It takes about 2 weeks for protection to develop after vaccination, and protection lasts through the flu season. 3. Some people should not get this vaccine Tell the person who is giving you the vaccine:  If you have any severe, life-threatening allergies. If you ever had a life-threatening allergic reaction after a dose of flu vaccine, or have a severe allergy to any part of this vaccine, you may be advised not to get vaccinated. Most, but not all, types of flu vaccine contain a small amount of egg protein.  If you ever had Guillain-Barré Syndrome (also called GBS). Some people with a history of GBS should not get this vaccine. This should be discussed with your doctor.  If you are not feeling well. It is usually okay to get flu vaccine when you have a mild illness, but you might be asked to come back when you feel better. 4. Risks of a vaccine reaction With any medicine, including vaccines, there is a chance of reactions. These are usually mild and go away on their own, but serious reactions are also possible. Most people who get a flu shot do not have any problems with it. Minor problems following a flu shot include:  
 soreness, redness, or swelling where the shot was given  hoarseness  sore, red or itchy eyes  cough  fever  aches  headache  itching  fatigue If these problems occur, they usually begin soon after the shot and last 1 or 2 days. More serious problems following a flu shot can include the following:  There may be a small increased risk of Guillain-Barré Syndrome (GBS) after inactivated flu vaccine. This risk has been estimated at 1 or 2 additional cases per million people vaccinated. This is much lower than the risk of severe complications from flu, which can be prevented by flu vaccine.  Young children who get the flu shot along with pneumococcal vaccine (PCV13) and/or DTaP vaccine at the same time might be slightly more likely to have a seizure caused by fever. Ask your doctor for more information. Tell your doctor if a child who is getting flu vaccine has ever had a seizure. Problems that could happen after any injected vaccine:  People sometimes faint after a medical procedure, including vaccination. Sitting or lying down for about 15 minutes can help prevent fainting, and injuries caused by a fall. Tell your doctor if you feel dizzy, or have vision changes or ringing in the ears.  Some people get severe pain in the shoulder and have difficulty moving the arm where a shot was given. This happens very rarely.  Any medication can cause a severe allergic reaction. Such reactions from a vaccine are very rare, estimated at about 1 in a million doses, and would happen within a few minutes to a few hours after the vaccination. As with any medicine, there is a very remote chance of a vaccine causing a serious injury or death. The safety of vaccines is always being monitored. For more information, visit: www.cdc.gov/vaccinesafety/ 
 
5. What if there is a serious reaction? What should I look for?  Look for anything that concerns you, such as signs of a severe allergic reaction, very high fever, or unusual behavior.  
 
Signs of a severe allergic reaction can include hives, swelling of the face and throat, difficulty breathing, a fast heartbeat, dizziness, and weakness  usually within a few minutes to a few hours after the vaccination. What should I do?  If you think it is a severe allergic reaction or other emergency that cant wait, call 9-1-1 and get the person to the nearest hospital. Otherwise, call your doctor.  Reactions should be reported to the Vaccine Adverse Event Reporting System (VAERS). Your doctor should file this report, or you can do it yourself through  the VAERS web site at www.vaers. [facility].gov, or by calling 8-800.788.3558. VAERS does not give medical advice. 6. The National Vaccine Injury Compensation Program 
 
The [facility] Vaccine Injury Compensation Program (VICP) is a federal program that was created to compensate people who may have been injured by certain vaccines. Persons who believe they may have been injured by a vaccine can learn about the program and about filing a claim by calling 0-984.669.7146 or visiting the [address] website at www.[facility].gov/vaccinecompensation. There is a time limit to file a claim for compensation. 7. How can I learn more?  Ask your healthcare provider. He or she can give you the vaccine package insert or suggest other sources of information.  Call your local or state health department.  Contact the Centers for Disease Control and Prevention (CDC): 
- Call 7-309.199.8631 (1-800-CDC-INFO) or 
- Visit CDCs website at www.cdc.gov/flu Vaccine Information Statement Inactivated Influenza Vaccine 8/7/2015 
42 UMarcia Gamboa Sarabjit [id number] [facility] and [facility] Centers for Disease Control and Prevention Office Use Only

## 2018-11-19 NOTE — PROGRESS NOTES
HISTORY OF PRESENT ILLNESS Pippa Banegas is a 59 y.o. female. Seen in follow-up for HTN, prediabetes, hyperlipidemia, DJD in multiple joints, seasonal allergic rhinitis, gout, pedal edema, depression. No problems with medications. She was not aware of her positive FIT test or referral to Dr. Jamia Bravo. No gout episodes. She will get a flu shot today. Her fingers are often stiff. Review of Systems Constitutional: Negative for chills and fever. Respiratory: Negative for shortness of breath. Cardiovascular: Positive for leg swelling. Negative for chest pain and palpitations. Gastrointestinal: Negative for nausea and vomiting. Musculoskeletal: Positive for joint pain. Neurological: Negative for tingling, focal weakness and headaches. Psychiatric/Behavioral: The patient does not have insomnia. Visit Vitals /67 Pulse 87 Temp 97.4 °F (36.3 °C) (Oral) Resp 18 Ht 5' 9\" (1.753 m) Wt 269 lb (122 kg) SpO2 93% BMI 39.72 kg/m² Physical Exam  
Constitutional: She is oriented to person, place, and time. She appears well-developed and well-nourished. No distress. Neck: Neck supple. No thyromegaly present. Carotid bruit absent Cardiovascular: Normal rate, regular rhythm and intact distal pulses. Exam reveals no gallop and no friction rub. No murmur heard. Pulmonary/Chest: Effort normal and breath sounds normal. No respiratory distress. Musculoskeletal: She exhibits edema. Lymphadenopathy:  
  She has no cervical adenopathy. Neurological: She is alert and oriented to person, place, and time. Skin: Skin is warm and dry. Psychiatric: She has a normal mood and affect. Her behavior is normal. Judgment and thought content normal.  
Nursing note and vitals reviewed. ASSESSMENT and PLAN 
  ICD-10-CM ICD-9-CM 1. Essential hypertension I10 401.9 2. Hyperlipidemia, unspecified hyperlipidemia type E78.5 272.4 3. Primary osteoarthritis of right knee M17.11 715.16   
4. Severe obesity (BMI 35.0-39. 9) with comorbidity ([facility] [address] E66.01 278.01   
5. Impaired glucose tolerance R73.02 790.22   
6. Gout involving toe, unspecified cause, unspecified chronicity, unspecified laterality M10.9 274.9 7. Encounter for immunization Z23 V03.89 INFLUENZA VIRUS VAC QUAD,SPLIT,PRESV FREE SYRINGE IM Follow-up Disposition: 
Return in about 6 months (around 5/19/2019). current treatment plan is effective, no change in therapy Flu shot today. Given contact information for Dr. Lauren Summers office for colonoscopy. reviewed medications and side effects in detail Plan of care reviewed - patient verbalize(s) understanding and agreement.

## 2018-12-31 ENCOUNTER — OFFICE VISIT (OUTPATIENT)
Dept: ORTHOPEDIC SURGERY | Facility: CLINIC | Age: 63
End: 2018-12-31

## 2018-12-31 VITALS
SYSTOLIC BLOOD PRESSURE: 138 MMHG | OXYGEN SATURATION: 96 % | WEIGHT: 267.8 LBS | HEIGHT: 69 IN | DIASTOLIC BLOOD PRESSURE: 79 MMHG | TEMPERATURE: 97.9 F | HEART RATE: 86 BPM | RESPIRATION RATE: 15 BRPM | BODY MASS INDEX: 39.66 KG/M2

## 2018-12-31 DIAGNOSIS — M25.561 RIGHT KNEE PAIN, UNSPECIFIED CHRONICITY: Primary | ICD-10-CM

## 2018-12-31 DIAGNOSIS — T84.012D FAILED TOTAL RIGHT KNEE REPLACEMENT, SUBSEQUENT ENCOUNTER: ICD-10-CM

## 2018-12-31 NOTE — PROGRESS NOTES
1. Have you been to the ER, urgent care clinic since your last visit? Hospitalized since your last visit? No 
 
2. Have you seen or consulted any other health care providers outside of the [address] [place] [address] since your last visit? Include any pap smears or colon screening.  No 
 
 54 year old man with a history of DDRT (2014), CAD, DM2, HTN, siezure presents to the hospital with chills, decreased vision, headache. Pt. found to have RICCO.

## 2018-12-31 NOTE — PROGRESS NOTES
Patient: Richard Patino                MRN: [id number]       SSN: [id number] YOB: 1955        AGE: 59 y.o. SEX: female Body mass index is 39.55 kg/m². PCP: Elsy Callahan MD 
12/31/18 HISTORY: Zach Walker returns in follow up. She is a very nice lady. It is to be remembered she had an original knee replacement and revision done by Dr. Юлия Hopkins and had a failed femoral side. She has a stepped stem and, unfortunately, I could not, without major osteotomy, remove the femoral component. We recemented and removed all of the scar tissue. She is actually much happier with the knee than she was prior, but she is going to need a revision eventually. I sent her up to the [place] [address], and they recommended just observation for the time being, as she said she was reasonably happy with it. Having said this, she has no true start-up discomfort, but she has activity-related pain and does tend to walk with an antalgic gait after a longer distance. She does get some mid-flexion instability but not severely so, and we are just watching it for now. She understands that she is going to require a more extensive revision, and she will likely require having a distal femoral replacemetn as a back up at the time. Her surgery will likely be done at one of the [facility]. PHYSICAL EXAMINATION:  On examination today, she bends to about 100°. She has full extension. The knee itself does have some mild to moderate mid-flexion instability but not severely so. The patella tracks nicely. She has been worked up for infection, which is otherwise negative. RADIOGRAPHS:  On review of her x-rays, the stem did shift, but I do not see a lot of change from previous. PLAN:  We will see her back in six months and get new x-rays.   It is really going to be up to her symptomatically when she is ready to have the knee revised, and it is going to be a fairly big operation, as the stem is not easily removable. REVIEW OF SYSTEMS:   
 
CON: negative for weight loss, fever EYE: negative for double vision ENT: negative for hoarseness RS:   negative for Tb 
GI:    negative for blood in stool :  negative for blood in urine Other systems reviewed and noted below. Past Medical History:  
Diagnosis Date  Arthritis  Elevated cholesterol  Hypertension  Joint pain   
 knees Family History Problem Relation Age of Onset  Diabetes Mother  Colon Polyps Mother  Dementia Mother  Heart Disease Father  Hypertension Brother Current Outpatient Medications Medication Sig Dispense Refill  celecoxib (CELEBREX) 200 mg capsule TAKE 1 CAP BY MOUTH TWO (2) TIMES DAILY (WITH MEALS). 60 Cap 2  furosemide (LASIX) 20 mg tablet TAKE 1 TAB BY MOUTH DAILY AS NEEDED. INDICATIONS: SWELLING 90 Tab 3  
 allopurinol (ZYLOPRIM) 300 mg tablet Take 1 Tab by mouth daily. Indications: prevention of acute gout attack 90 Tab 3  
 fexofenadine (ALLEGRA) 180 mg tablet TAKE 1 TABLET BY MOUTH DAILY 90 Tab 3  
 morinda citrifolia fruit (ELIO) 250 mg cap Take 1 Cap by mouth daily.  buffered aspirin (BUFFERIN) 325 mg tablet Take 1 Tab by mouth two (2) times a day. 60 Tab 0  
 amLODIPine (NORVASC) 10 mg tablet TAKE 1 TABLET BY MOUTH DAILY 90 Tab 3  
 atorvastatin (LIPITOR) 20 mg tablet TAKE 1 TABLET BY MOUTH DAILY 90 Tab 3  
 losartan-hydroCHLOROthiazide (HYZAAR) 100-25 mg per tablet TAKE 1 TABLET BY MOUTH DAILY. 90 Tab 3  
 Comp. Stocking,Knee,Regular,Lrg misc Use daily and take off at night, 20mmHg 1 Packet 0  
 polyethylene glycol (MIRALAX) 17 gram/dose powder Take as directed by office (Patient taking differently: as needed. Take as directed by office) 255 g 0  
 MULTIVIT-MIN/FA/CALCIUM/VIT K1 (ONE-A-DAY WOMEN'S 50+ PO) Take 1 Tab by mouth daily.  Magnesium Oxide 500 mg cap Take 400 mg by mouth daily. No Known Allergies Past Surgical History:  
Procedure Laterality Date  HX HYSTERECTOMY  HX KNEE REPLACEMENT    
 HX ORTHOPAEDIC Right Feb, 2016 TKR  HX TONSILLECTOMY Social History Socioeconomic History  Marital status: SINGLE Spouse name: Not on file  Number of children: Not on file  Years of education: Not on file  Highest education level: Not on file Social Needs  Financial resource strain: Not on file  Food insecurity - worry: Not on file  Food insecurity - inability: Not on file  Transportation needs - medical: Not on file  Transportation needs - non-medical: Not on file Occupational History  Not on file Tobacco Use  Smoking status: Never Smoker  Smokeless tobacco: Never Used Substance and Sexual Activity  Alcohol use: No  
 Drug use: No  
 Sexual activity: Yes  
  Partners: Male Birth control/protection: None Other Topics Concern   Service No  
 Blood Transfusions No  
 Caffeine Concern Yes  Occupational Exposure No  
 Hobby Hazards No  
 Sleep Concern No  
 Stress Concern No  
 Weight Concern No  
 Special Diet No  
 Back Care No  
 Exercise No  
 Bike Helmet No  
 Seat Belt Yes  Self-Exams Yes Social History Narrative  Not on file Visit Vitals /79 Pulse 86 Temp 97.9 °F (36.6 °C) (Oral) Resp 15 Ht 5' 9\" (1.753 m) Wt 267 lb 12.8 oz (121.5 kg) SpO2 96% BMI 39.55 kg/m² PHYSICAL EXAMINATION: 
GENERAL: Alert and oriented x3, in no acute distress, well-developed, well-nourished, afebrile. HEART: No JVD. EYES: No scleral icterus NECK: No significant lymphadenopathy LUNGS: No respiratory compromise or indrawing ABDOMEN: Soft, non-tender, non-distended. Electronically signed by:  Krys Zimmer MD

## 2019-01-24 RX ORDER — CELECOXIB 200 MG/1
200 CAPSULE ORAL 2 TIMES DAILY WITH MEALS
Qty: 60 CAP | Refills: 2 | Status: SHIPPED | OUTPATIENT
Start: 2019-01-24 | End: 2019-04-20 | Stop reason: SDUPTHER

## 2019-02-12 ENCOUNTER — TELEPHONE (OUTPATIENT)
Dept: SURGERY | Age: 64
End: 2019-02-12

## 2019-02-12 NOTE — TELEPHONE ENCOUNTER
Called the patient and LM with the individual who answered the phone. Left office number to relay to pt, to have her call us back for an appointment.

## 2019-02-26 ENCOUNTER — TELEPHONE (OUTPATIENT)
Dept: SURGERY | Age: 64
End: 2019-02-26

## 2019-02-26 NOTE — TELEPHONE ENCOUNTER
Called number [phone] on file. Person who answered the phone, stated she is the patient's sister but will relay the message to her to call us back.

## 2019-02-26 NOTE — TELEPHONE ENCOUNTER
Called pt cell number [phone], phone is currently not in service.   Called home number [phone], no  answer

## 2019-02-27 DIAGNOSIS — E78.5 HYPERLIPIDEMIA, UNSPECIFIED HYPERLIPIDEMIA TYPE: ICD-10-CM

## 2019-02-27 DIAGNOSIS — I10 ESSENTIAL HYPERTENSION: ICD-10-CM

## 2019-02-27 RX ORDER — LOSARTAN POTASSIUM AND HYDROCHLOROTHIAZIDE 25; 100 MG/1; MG/1
TABLET ORAL
Qty: 90 TAB | Refills: 3 | Status: SHIPPED | OUTPATIENT
Start: 2019-02-27 | End: 2019-11-10 | Stop reason: ALTCHOICE

## 2019-02-27 RX ORDER — ATORVASTATIN CALCIUM 20 MG/1
TABLET, FILM COATED ORAL
Qty: 90 TAB | Refills: 3 | Status: SHIPPED | OUTPATIENT
Start: 2019-02-27 | End: 2020-03-03 | Stop reason: SDUPTHER

## 2019-02-27 NOTE — TELEPHONE ENCOUNTER
Verbal order given per Dr. Nivia Landa for refill of   Requested Prescriptions     Pending Prescriptions Disp Refills    losartan-hydroCHLOROthiazide (HYZAAR) 100-25 mg per tablet 90 Tab 3     Sig: TAKE 1 TABLET BY MOUTH DAILY.

## 2019-02-27 NOTE — TELEPHONE ENCOUNTER
Verbal order given per Dr. Joshua Wallis for medication refill   Requested Prescriptions     Pending Prescriptions Disp Refills    atorvastatin (LIPITOR) 20 mg tablet 90 Tab 3     Sig: TAKE 1 TABLET BY MOUTH DAILY

## 2019-03-12 ENCOUNTER — CLINICAL SUPPORT (OUTPATIENT)
Dept: SURGERY | Age: 64
End: 2019-03-12

## 2019-03-12 VITALS
WEIGHT: 266 LBS | BODY MASS INDEX: 39.4 KG/M2 | HEART RATE: 104 BPM | HEIGHT: 69 IN | RESPIRATION RATE: 18 BRPM | OXYGEN SATURATION: 98 %

## 2019-03-12 DIAGNOSIS — Z12.11 COLON CANCER SCREENING: Primary | ICD-10-CM

## 2019-03-12 NOTE — PROGRESS NOTES
Review of Systems   Constitutional: Negative. HENT: Negative. Eyes: Negative. Respiratory: Negative. Cardiovascular: Positive for leg swelling. Negative for chest pain, palpitations, orthopnea, claudication and PND. Gastrointestinal: Positive for blood in stool. Negative for abdominal pain, constipation, diarrhea, heartburn, melena, nausea and vomiting. Fecal occult blood test   Genitourinary: Negative. Musculoskeletal: Positive for back pain and joint pain. Negative for falls, myalgias and neck pain. Skin: Negative. Neurological: Negative. Endo/Heme/Allergies: Negative. Psychiatric/Behavioral: Negative. Colon Screen    Patient: Laury Aaron MRN: [id number]  SSN: [id number]    YOB: 1955  Age: 59 y.o. Sex: female        Subjective:   Laury Aaron was referred by her PCP, Deni Del Valle MD.  Patient referred for colonoscopy for   Screening colonoscopy. Patient had a positive fecal occult blood test. Patient denies rectal pain or bleeding. Abdominal surgeries as described below, specifically hysterectomy. Family history as described below, specifically none. Patient has never had a colonoscopy. No Known Allergies    Past Medical History:   Diagnosis Date    Arthritis     Elevated cholesterol     Hypertension     Joint pain     knees     Past Surgical History:   Procedure Laterality Date    HX HYSTERECTOMY      HX KNEE REPLACEMENT Left     HX ORTHOPAEDIC Right Feb, 2016    TKR    HX TONSILLECTOMY        Family History   Problem Relation Age of Onset   [facility] Diabetes Mother     Colon Polyps Mother     Dementia Mother     Heart Disease Father     Hypertension Brother      Social History     Tobacco Use    Smoking status: Never Smoker    Smokeless tobacco: Never Used   Substance Use Topics    Alcohol use: No      Prior to Admission medications    Medication Sig Start Date End Date Taking?  Authorizing Provider   atorvastatin (LIPITOR) 20 mg tablet TAKE 1 TABLET BY MOUTH DAILY 2/27/19  Yes Yessi Squires MD   losartan-hydroCHLOROthiazide (HYZAAR) 100-25 mg per tablet TAKE 1 TABLET BY MOUTH DAILY. 2/27/19  Yes Yessi Squires MD   celecoxib (CELEBREX) 200 mg capsule TAKE 1 CAP BY MOUTH TWO (2) TIMES DAILY (WITH MEALS). 1/24/19  Yes Miim Lerma PA   furosemide (LASIX) 20 mg tablet TAKE 1 TAB BY MOUTH DAILY AS NEEDED. INDICATIONS: SWELLING 10/15/18  Yes Yessi Squires MD   allopurinol (ZYLOPRIM) 300 mg tablet Take 1 Tab by mouth daily. Indications: prevention of acute gout attack 8/28/18  Yes Yessi Squires MD   fexofenadine (ALLEGRA) 180 mg tablet TAKE 1 TABLET BY MOUTH DAILY 5/21/18  Yes Yessi Squires MD   morinda citrifolia fruit (ELIO) 250 mg cap Take 1 Cap by mouth daily. Yes Provider, Historical   amLODIPine (NORVASC) 10 mg tablet TAKE 1 TABLET BY MOUTH DAILY 3/19/18  Yes Prerna Larkin NP   Comp. Stocking,Knee,Regular,Lrg misc Use daily and take off at night, 20mmHg 7/14/17  Yes Rufus Saleem MD   polyethylene glycol (MIRALAX) 17 gram/dose powder Take as directed by office  Patient taking differently: as needed. Take as directed by office 6/15/16  Yes Pete Richardson MD   MULTIVIT-MIN/FA/CALCIUM/VIT K1 (ONE-A-DAY WOMEN'S 50+ PO) Take 1 Tab by mouth daily. Yes Provider, Historical   Magnesium Oxide 500 mg cap Take 400 mg by mouth daily. Yes Provider, Historical   buffered aspirin (BUFFERIN) 325 mg tablet Take 1 Tab by mouth two (2) times a day. 5/3/18   Ella Low PA-C          Review of Systems:      Risks colonoscopy described- colon injury, missed lesion, anesthesia problems, bleeding       Yael Cerda, LPN  March 12, 5960  1:51 PM

## 2019-04-22 RX ORDER — CELECOXIB 200 MG/1
200 CAPSULE ORAL 2 TIMES DAILY WITH MEALS
Qty: 60 CAP | Refills: 2 | Status: SHIPPED | OUTPATIENT
Start: 2019-04-22 | End: 2019-06-19

## 2019-06-18 ENCOUNTER — ANESTHESIA EVENT (OUTPATIENT)
Dept: ENDOSCOPY | Age: 64
End: 2019-06-18
Payer: COMMERCIAL

## 2019-06-19 ENCOUNTER — HOSPITAL ENCOUNTER (OUTPATIENT)
Age: 64
Setting detail: OUTPATIENT SURGERY
Discharge: HOME | End: 2019-06-19
Attending: COLON & RECTAL SURGERY | Admitting: COLON & RECTAL SURGERY
Payer: COMMERCIAL

## 2019-06-19 ENCOUNTER — ANESTHESIA (OUTPATIENT)
Dept: ENDOSCOPY | Age: 64
End: 2019-06-19
Payer: COMMERCIAL

## 2019-06-19 VITALS
HEART RATE: 86 BPM | RESPIRATION RATE: 20 BRPM | DIASTOLIC BLOOD PRESSURE: 85 MMHG | WEIGHT: 266 LBS | OXYGEN SATURATION: 100 % | HEIGHT: 70 IN | BODY MASS INDEX: 38.08 KG/M2 | SYSTOLIC BLOOD PRESSURE: 134 MMHG | TEMPERATURE: 98.7 F

## 2019-06-19 PROCEDURE — 77030037006 HC FCPS BIOP ENDOSC DISP OCOA -A: Performed by: COLON & RECTAL SURGERY

## 2019-06-19 PROCEDURE — 74011250636 HC RX REV CODE- 250/636

## 2019-06-19 PROCEDURE — 88305 TISSUE EXAM BY PATHOLOGIST: CPT

## 2019-06-19 PROCEDURE — 76040000007: Performed by: COLON & RECTAL SURGERY

## 2019-06-19 PROCEDURE — 74011250636 HC RX REV CODE- 250/636: Performed by: ANESTHESIOLOGY

## 2019-06-19 PROCEDURE — 76060000032 HC ANESTHESIA 0.5 TO 1 HR: Performed by: COLON & RECTAL SURGERY

## 2019-06-19 PROCEDURE — 74011250636 HC RX REV CODE- 250/636: Performed by: NURSE ANESTHETIST, CERTIFIED REGISTERED

## 2019-06-19 RX ORDER — FENTANYL CITRATE 50 UG/ML
25 INJECTION, SOLUTION INTRAMUSCULAR; INTRAVENOUS
Status: DISCONTINUED | OUTPATIENT
Start: 2019-06-19 | End: 2019-06-19 | Stop reason: HOSPADM

## 2019-06-19 RX ORDER — HYDROCODONE BITARTRATE AND ACETAMINOPHEN 5; 325 MG/1; MG/1
1 TABLET ORAL
Status: DISCONTINUED | OUTPATIENT
Start: 2019-06-19 | End: 2019-06-19 | Stop reason: HOSPADM

## 2019-06-19 RX ORDER — LIDOCAINE HYDROCHLORIDE 10 MG/ML
0.1 INJECTION, SOLUTION EPIDURAL; INFILTRATION; INTRACAUDAL; PERINEURAL AS NEEDED
Status: DISCONTINUED | OUTPATIENT
Start: 2019-06-19 | End: 2019-06-19 | Stop reason: HOSPADM

## 2019-06-19 RX ORDER — MAGNESIUM SULFATE 100 %
4 CRYSTALS MISCELLANEOUS AS NEEDED
Status: DISCONTINUED | OUTPATIENT
Start: 2019-06-19 | End: 2019-06-19 | Stop reason: SDUPTHER

## 2019-06-19 RX ORDER — DEXTROSE 50 % IN WATER (D50W) INTRAVENOUS SYRINGE
25-50 AS NEEDED
Status: DISCONTINUED | OUTPATIENT
Start: 2019-06-19 | End: 2019-06-19 | Stop reason: HOSPADM

## 2019-06-19 RX ORDER — SODIUM CHLORIDE 0.9 % (FLUSH) 0.9 %
5-40 SYRINGE (ML) INJECTION AS NEEDED
Status: DISCONTINUED | OUTPATIENT
Start: 2019-06-19 | End: 2019-06-19 | Stop reason: HOSPADM

## 2019-06-19 RX ORDER — SODIUM CHLORIDE 0.9 % (FLUSH) 0.9 %
5-40 SYRINGE (ML) INJECTION EVERY 8 HOURS
Status: DISCONTINUED | OUTPATIENT
Start: 2019-06-19 | End: 2019-06-19 | Stop reason: HOSPADM

## 2019-06-19 RX ORDER — NALOXONE HYDROCHLORIDE 0.4 MG/ML
0.2 INJECTION, SOLUTION INTRAMUSCULAR; INTRAVENOUS; SUBCUTANEOUS AS NEEDED
Status: DISCONTINUED | OUTPATIENT
Start: 2019-06-19 | End: 2019-06-19 | Stop reason: SDUPTHER

## 2019-06-19 RX ORDER — SODIUM CHLORIDE, SODIUM LACTATE, POTASSIUM CHLORIDE, CALCIUM CHLORIDE 600; 310; 30; 20 MG/100ML; MG/100ML; MG/100ML; MG/100ML
INJECTION, SOLUTION INTRAVENOUS
Status: DISCONTINUED | OUTPATIENT
Start: 2019-06-19 | End: 2019-06-19 | Stop reason: HOSPADM

## 2019-06-19 RX ORDER — PROPOFOL 10 MG/ML
INJECTION, EMULSION INTRAVENOUS
Status: DISCONTINUED | OUTPATIENT
Start: 2019-06-19 | End: 2019-06-19 | Stop reason: HOSPADM

## 2019-06-19 RX ORDER — SODIUM CHLORIDE 0.9 % (FLUSH) 0.9 %
5-40 SYRINGE (ML) INJECTION AS NEEDED
Status: DISCONTINUED | OUTPATIENT
Start: 2019-06-19 | End: 2019-06-19 | Stop reason: SDUPTHER

## 2019-06-19 RX ORDER — FENTANYL CITRATE 50 UG/ML
25 INJECTION, SOLUTION INTRAMUSCULAR; INTRAVENOUS
Status: DISCONTINUED | OUTPATIENT
Start: 2019-06-19 | End: 2019-06-19 | Stop reason: SDUPTHER

## 2019-06-19 RX ORDER — MIDAZOLAM HYDROCHLORIDE 1 MG/ML
1 INJECTION, SOLUTION INTRAMUSCULAR; INTRAVENOUS
Status: DISCONTINUED | OUTPATIENT
Start: 2019-06-19 | End: 2019-06-19 | Stop reason: HOSPADM

## 2019-06-19 RX ORDER — SODIUM CHLORIDE 0.9 % (FLUSH) 0.9 %
5-40 SYRINGE (ML) INJECTION EVERY 8 HOURS
Status: DISCONTINUED | OUTPATIENT
Start: 2019-06-19 | End: 2019-06-19 | Stop reason: SDUPTHER

## 2019-06-19 RX ORDER — MAGNESIUM SULFATE 100 %
4 CRYSTALS MISCELLANEOUS AS NEEDED
Status: DISCONTINUED | OUTPATIENT
Start: 2019-06-19 | End: 2019-06-19 | Stop reason: HOSPADM

## 2019-06-19 RX ORDER — DEXTROSE 50 % IN WATER (D50W) INTRAVENOUS SYRINGE
25-50 AS NEEDED
Status: DISCONTINUED | OUTPATIENT
Start: 2019-06-19 | End: 2019-06-19 | Stop reason: SDUPTHER

## 2019-06-19 RX ORDER — SODIUM CHLORIDE, SODIUM LACTATE, POTASSIUM CHLORIDE, CALCIUM CHLORIDE 600; 310; 30; 20 MG/100ML; MG/100ML; MG/100ML; MG/100ML
75 INJECTION, SOLUTION INTRAVENOUS CONTINUOUS
Status: DISCONTINUED | OUTPATIENT
Start: 2019-06-19 | End: 2019-06-19 | Stop reason: HOSPADM

## 2019-06-19 RX ORDER — MIDAZOLAM HYDROCHLORIDE 1 MG/ML
1 INJECTION, SOLUTION INTRAMUSCULAR; INTRAVENOUS
Status: DISCONTINUED | OUTPATIENT
Start: 2019-06-19 | End: 2019-06-19 | Stop reason: SDUPTHER

## 2019-06-19 RX ORDER — SODIUM CHLORIDE, SODIUM LACTATE, POTASSIUM CHLORIDE, CALCIUM CHLORIDE 600; 310; 30; 20 MG/100ML; MG/100ML; MG/100ML; MG/100ML
75 INJECTION, SOLUTION INTRAVENOUS CONTINUOUS
Status: DISCONTINUED | OUTPATIENT
Start: 2019-06-19 | End: 2019-06-19 | Stop reason: SDUPTHER

## 2019-06-19 RX ORDER — LIDOCAINE HYDROCHLORIDE 10 MG/ML
0.1 INJECTION, SOLUTION EPIDURAL; INFILTRATION; INTRACAUDAL; PERINEURAL AS NEEDED
Status: DISCONTINUED | OUTPATIENT
Start: 2019-06-19 | End: 2019-06-19 | Stop reason: SDUPTHER

## 2019-06-19 RX ORDER — SODIUM CHLORIDE, SODIUM LACTATE, POTASSIUM CHLORIDE, CALCIUM CHLORIDE 600; 310; 30; 20 MG/100ML; MG/100ML; MG/100ML; MG/100ML
125 INJECTION, SOLUTION INTRAVENOUS CONTINUOUS
Status: DISCONTINUED | OUTPATIENT
Start: 2019-06-19 | End: 2019-06-19 | Stop reason: SDUPTHER

## 2019-06-19 RX ORDER — HYDROCODONE BITARTRATE AND ACETAMINOPHEN 5; 325 MG/1; MG/1
1 TABLET ORAL
Status: DISCONTINUED | OUTPATIENT
Start: 2019-06-19 | End: 2019-06-19 | Stop reason: SDUPTHER

## 2019-06-19 RX ORDER — SODIUM CHLORIDE, SODIUM LACTATE, POTASSIUM CHLORIDE, CALCIUM CHLORIDE 600; 310; 30; 20 MG/100ML; MG/100ML; MG/100ML; MG/100ML
50 INJECTION, SOLUTION INTRAVENOUS CONTINUOUS
Status: DISCONTINUED | OUTPATIENT
Start: 2019-06-19 | End: 2019-06-19 | Stop reason: HOSPADM

## 2019-06-19 RX ORDER — NALOXONE HYDROCHLORIDE 0.4 MG/ML
0.2 INJECTION, SOLUTION INTRAMUSCULAR; INTRAVENOUS; SUBCUTANEOUS AS NEEDED
Status: DISCONTINUED | OUTPATIENT
Start: 2019-06-19 | End: 2019-06-19 | Stop reason: HOSPADM

## 2019-06-19 RX ADMIN — SODIUM CHLORIDE, SODIUM LACTATE, POTASSIUM CHLORIDE, CALCIUM CHLORIDE: 600; 310; 30; 20 INJECTION, SOLUTION INTRAVENOUS at 11:00

## 2019-06-19 RX ADMIN — FAMOTIDINE 20 MG: 10 INJECTION INTRAVENOUS at 10:43

## 2019-06-19 RX ADMIN — PROPOFOL 7 MCG/KG/MIN: 10 INJECTION, EMULSION INTRAVENOUS at 11:15

## 2019-06-19 RX ADMIN — SODIUM CHLORIDE, SODIUM LACTATE, POTASSIUM CHLORIDE, AND CALCIUM CHLORIDE 50 ML/HR: 600; 310; 30; 20 INJECTION, SOLUTION INTRAVENOUS at 10:39

## 2019-06-19 NOTE — ANESTHESIA PREPROCEDURE EVALUATION
Anesthetic History   No history of anesthetic complications            Review of Systems / Medical History  Patient summary reviewed and pertinent labs reviewed    Pulmonary  Within defined limits                 Neuro/Psych   Within defined limits          Comments: Chronic pain Cardiovascular    Hypertension: well controlled              Exercise tolerance: >4 METS     GI/Hepatic/Renal  Within defined limits              Endo/Other        Obesity, arthritis and anemia    Comments: Borderline diabetes - attempting diet control Other Findings   Comments: Documentation of current medication  Current medications obtained, documented and obtained? YES      Risk Factors for Postoperative nausea/vomiting:       History of postoperative nausea/vomiting? NO       Female? YES       Motion sickness? NO       Intended opioid administration for postoperative analgesia? YES      Smoking Abstinence:  Current Smoker? NO  Elective Surgery? YES  Seen preoperatively by anesthesiologist or proxy prior to day of surgery? YES  Pt abstained from smoking 24 hours prior to anesthesia?  YES    Preventive care/screening for High Blood Pressure:  Aged 18 years and older: YES  Screened for high blood pressure: YES  Patients with high blood pressure referred to primary care provider   for BP management: YES                 Physical Exam    Airway  Mallampati: I  TM Distance: > 6 cm  Neck ROM: normal range of motion   Mouth opening: Normal     Cardiovascular  Regular rate and rhythm,  S1 and S2 normal,  no murmur, click, rub, or gallop             Dental  No notable dental hx       Pulmonary                 Abdominal  GI exam deferred       Other Findings            Anesthetic Plan    ASA: 3  Anesthesia type: MAC          Induction: Intravenous  Anesthetic plan and risks discussed with: Patient

## 2019-06-19 NOTE — ANESTHESIA POSTPROCEDURE EVALUATION
Procedure(s):  COLONOSCOPY with polyp bx.     MAC    Anesthesia Post Evaluation      Multimodal analgesia: multimodal analgesia used between 6 hours prior to anesthesia start to PACU discharge  Patient location during evaluation: bedside  Patient participation: complete - patient participated  Level of consciousness: awake  Pain management: adequate  Airway patency: patent  Anesthetic complications: no  Cardiovascular status: stable  Respiratory status: acceptable  Hydration status: acceptable  Post anesthesia nausea and vomiting:  controlled      Vitals Value Taken Time   /60 6/19/2019 11:44 AM   Temp     Pulse 92 6/19/2019 11:44 AM   Resp     SpO2 100 % 6/19/2019 11:44 AM

## 2019-06-19 NOTE — DISCHARGE INSTRUCTIONS
Colonoscopy: What to Expect at [address] [place] [address]  After you have a colonoscopy, you will stay at the clinic for 1 to 2 hours until the medicines wear off. Then you can go home. But you will need to arrange for a ride. Your doctor will tell you when you can eat and do your other usual activities. Your doctor will talk to you about when you will need your next colonoscopy. Your doctor can help you decide how often you need to be checked. This will depend on the results of your test and your risk for colorectal cancer. After the test, you may be bloated or have gas pains. You may need to pass gas. If a biopsy was done or a polyp was removed, you may have streaks of blood in your stool (feces) for a few days. This care sheet gives you a general idea about how long it will take for you to recover. But each person recovers at a different pace. Follow the steps below to get better as quickly as possible. How can you care for yourself at home? Activity  · Rest when you feel tired. · You can do your normal activities when it feels okay to do so. Diet  · Follow your doctor's directions for eating. · Unless your doctor has told you not to, drink plenty of fluids. This helps to replace the fluids that were lost during the colon prep. · Do not drink alcohol. Medicines  · If polyps were removed or a biopsy was done during the test, your doctor may tell you not to take aspirin or other anti-inflammatory medicines for a few days. These include ibuprofen (Advil, Motrin) and naproxen (Aleve). Other instructions  · For your safety, do not drive or operate machinery until the medicine wears off and you can think clearly. Your doctor may tell you not to drive or operate machinery until the day after your test.  · Do not sign legal documents or make major decisions until the medicine wears off and you can think clearly. The anesthesia can make it hard for you to fully understand what you are agreeing to.   Follow-up care is a key part of your treatment and safety. Be sure to make and go to all appointments, and call your doctor if you are having problems. It's also a good idea to know your test results and keep a list of the medicines you take. When should you call for help? Call 911 anytime you think you may need emergency care. For example, call if:  · You passed out (lost consciousness). · You pass maroon or bloody stools. · You have severe belly pain. Call your doctor now or seek immediate medical care if:  · Your stools are black and tarlike. · Your stools have streaks of blood, but you did not have a biopsy or any polyps removed. · You have belly pain, or your belly is swollen and firm. · You vomit. · You have a fever. · You are very dizzy. Watch closely for changes in your health, and be sure to contact your doctor if you have any problems. Where can you learn more? Go to GreenIt[employer]1.be  Enter E264 in the search box to learn more about \"Colonoscopy: What to Expect at Home. \"   © 2000-1298 Healthwise, Incorporated. Care instructions adapted under license by [facility] (which disclaims liability or warranty for this information). This care instruction is for use with your licensed healthcare professional. If you have questions about a medical condition or this instruction, always ask your healthcare professional. [address] any warranty or liability for your use of this information. Content Version: 88.7.266323; Current as of: November 14, 2014  Patient Education      Colon Polyps: Care Instructions  Your Care Instructions    Colon polyps are growths in the colon or the rectum. The cause of most colon polyps is not known, and most people who get them do not have any problems. But a certain kind can turn into cancer. For this reason, regular testing for colon polyps is important for people age 48 and older and anyone who has an increased risk for colon cancer.   Polyps are usually found through routine colon cancer screening tests. Although most colon polyps are not cancerous, they are usually removed and then tested for cancer. Screening for colon cancer saves lives because the cancer can usually be cured if it is caught early. If you have a polyp that is the type that can turn into cancer, you may need more tests to examine your entire colon. The doctor will remove any other polyps that he or she finds, and you will be tested more often. Follow-up care is a key part of your treatment and safety. Be sure to make and go to all appointments, and call your doctor if you are having problems. It's also a good idea to know your test results and keep a list of the medicines you take. How can you care for yourself at home? Regular exams to look for colon polyps are the best way to prevent polyps from turning into colon cancer. These can include stool tests, sigmoidoscopy, colonoscopy, and CT colonography. Talk with your doctor about a testing schedule that is right for you. To prevent polyps  There is no home treatment that can prevent colon polyps. But these steps may help lower your risk for cancer. · Stay active. Being active can help you get to and stay at a healthy weight. Try to exercise on most days of the week. Walking is a good choice. · Eat well. Choose a variety of vegetables, fruits, legumes (such as peas and beans), fish, poultry, and whole grains. · Do not smoke. If you need help quitting, talk to your doctor about stop-smoking programs and medicines. These can increase your chances of quitting for good. · If you drink alcohol, limit how much you drink. Limit alcohol to 2 drinks a day for men and 1 drink a day for women. When should you call for help?   Call your doctor now or seek immediate medical care if:    · You have severe belly pain.     · Your stools are maroon or very bloody.    Watch closely for changes in your health, and be sure to contact your doctor if:    · You have a fever.     · You have nausea or vomiting.     · You have a change in bowel habits (new constipation or diarrhea).     · Your symptoms get worse or are not improving as expected. Where can you learn more? Go to http://nahid-maureen.info/. Enter 95 [id number] in the search box to learn more about \"Colon Polyps: Care Instructions. \"  Current as of: March 27, 2018  Content Version: 11.9  © 0255-5542 "1,2,3 [employer]". Care instructions adapted under license by [employer] (which disclaims liability or warranty for this information). If you have questions about a medical condition or this instruction, always ask your healthcare professional. [place] [address] any warranty or liability for your use of this information. DISCHARGE SUMMARY from Nurse     POST-PROCEDURE INSTRUCTIONS:    Call your Physician if you:  ? Observe any excess bleeding. ? Develop a temperature over 100.5o F.  ? Experience abdominal, shoulder or chest pain. ? Notice any signs of decreased circulation or nerve impairment to an extremity such as a change in color, persistent numbness, tingling, coldness or increase in pain. ? Vomit blood or you have nausea and vomiting lasting longer than 4 hours. ? Are unable to take medications. ? Are unable to urinate within 8 hours after discharge following general anesthesia or intravenous sedation. For the next 24 hours after receiving general anesthesia or intravenous sedation, or while taking prescription Narcotics, limit your activities:  ? Do NOT drive a motor vehicle, operate hazard machinery or power tools, or perform tasks that require coordination. The medication you received during your procedure may have some effect on your mental awareness. ? Do NOT make important personal or business decisions. The medication you received during your procedure may have some effect on your mental awareness.   ? Do NOT drink alcoholic beverages. These drinks do not mix well with the medications that have been given to you. ? Upon discharge from the hospital, you must be accompanied by a responsible adult. ? Resume your diet as directed by your physician. ? Resume medications as your physician has prescribed. ? Please give a list of your current medications to your Primary Care Provider. ? Please update this list whenever your medications are discontinued, doses are changed, or new medications (including over-the-counter products) are added. ? Please carry medication information at all times in case of emergency situations. These are general instructions for a healthy lifestyle:    No smoking/ No tobacco products/ Avoid exposure to second hand smoke.  Surgeon General's Warning:  Quitting smoking now greatly reduces serious risk to your health. Obesity, smoking, and a sedentary lifestyle greatly increase your risk for illness.  A healthy diet, regular physical exercise & weight monitoring are important for maintaining a healthy lifestyle   You may be retaining fluid if you have a history of heart failure or if you experience any of the following symptoms:  Weight gain of 3 pounds or more overnight or 5 pounds in a week, increased swelling in our hands or feet or shortness of breath while lying flat in bed. Please call your doctor as soon as you notice any of these symptoms; do not wait until your next office visit. Recognize signs and symptoms of STROKE:  F  -  Face looks uneven  A  -  Arms unable to move or move unevenly  S  -  Speech slurred or non-existent  T  -  Time to call 911 - as soon as signs and symptoms begin - DO NOT go back to bed or wait to see If you get better - TIME IS BRAIN. Colorectal Screening   Colorectal cancer almost always develops from precancerous polyps (abnormal growths) in the colon or rectum.   Screening tests can find precancerous polyps, so that they can be removed before they turn into cancer. Screening tests can also find colorectal cancer early, when treatment works best.  [facility] Speak with your physician about when you should begin screening and how often you should be tested. Additional Information    If you have questions, please call [phone]. Remember, [employer] is NOT to be used for urgent needs. For medical emergencies, dial 911. Educational references and/or instructions provided during this visit included:    Colon Polyps      APPOINTMENTS:    Please make a follow-up appointment with your physician. Discharge information has been reviewed with the patient and Sister. The patient and sister verbalized understanding.

## 2019-06-19 NOTE — H&P
HPI: Peggy Sheriff is a 59 y.o. female presenting with chief complain of need for crc screening. Past Medical History:   Diagnosis Date    Arthritis     Elevated cholesterol     Hypertension     Joint pain     knees       Past Surgical History:   Procedure Laterality Date    HX HYSTERECTOMY      HX KNEE REPLACEMENT Left     HX ORTHOPAEDIC Right Feb, 2016    TKR    HX TONSILLECTOMY         Family History   Problem Relation Age of Onset    Diabetes Mother     Colon Polyps Mother     Dementia Mother     Heart Disease Father     Hypertension Brother        Social History     Socioeconomic History    Marital status: SINGLE     Spouse name: Not on file    Number of children: Not on file    Years of education: Not on file    Highest education level: Not on file   Tobacco Use    Smoking status: Never Smoker    Smokeless tobacco: Never Used   Substance and Sexual Activity    Alcohol use: No    Drug use: No    Sexual activity: Yes     Partners: Male     Birth control/protection: None   Other Topics Concern     Service No    Blood Transfusions No    Caffeine Concern Yes    Occupational Exposure No    Hobby Hazards No    Sleep Concern No    Stress Concern No    Weight Concern No    Special Diet No    Back Care No    Exercise No    Bike Helmet No    Seat Belt Yes    Self-Exams Yes       Review of Systems - neg    Outpatient Medications Marked as Taking for the 6/19/19 encounter [facility] Encounter)   Medication Sig Dispense Refill    celecoxib (CELEBREX) 200 mg capsule TAKE 1 CAP BY MOUTH TWO (2) TIMES DAILY (WITH MEALS). 60 Cap 2    atorvastatin (LIPITOR) 20 mg tablet TAKE 1 TABLET BY MOUTH DAILY 90 Tab 3    losartan-hydroCHLOROthiazide (HYZAAR) 100-25 mg per tablet TAKE 1 TABLET BY MOUTH DAILY. 90 Tab 3    furosemide (LASIX) 20 mg tablet TAKE 1 TAB BY MOUTH DAILY AS NEEDED. INDICATIONS: SWELLING 90 Tab 3    allopurinol (ZYLOPRIM) 300 mg tablet Take 1 Tab by mouth daily. Indications: prevention of acute gout attack 90 Tab 3    fexofenadine (ALLEGRA) 180 mg tablet TAKE 1 TABLET BY MOUTH DAILY 90 Tab 3    morinda citrifolia fruit (ELIO) 250 mg cap Take 1 Cap by mouth daily.  amLODIPine (NORVASC) 10 mg tablet TAKE 1 TABLET BY MOUTH DAILY 90 Tab 3    MULTIVIT-MIN/FA/CALCIUM/VIT K1 (ONE-A-DAY WOMEN'S 50+ PO) Take 1 Tab by mouth daily.  Magnesium Oxide 500 mg cap Take 400 mg by mouth daily. No Known Allergies    Vitals:    06/17/19 1414   Weight: 120.7 kg (266 lb)   Height: 5' 9.5\" (1.765 m)       Physical Exam   Constitutional: She appears well-developed and well-nourished. HENT:   Head: Normocephalic and atraumatic. Eyes: Conjunctivae and EOM are normal.   Abdominal: Soft. She exhibits no distension. There is no tenderness. Musculoskeletal: Normal range of motion. Lymphadenopathy:     She has no cervical adenopathy. Right: No inguinal adenopathy present. Left: No inguinal adenopathy present. Neurological: She exhibits normal muscle tone. Skin: Skin is warm and dry. No rash noted. Psychiatric: She has a normal mood and affect. Her speech is normal.       Assessment / Plan    colonoscopy    The diagnoses and plan were discussed with the patient. All questions answered. Plan of care agreed to by all concerned.

## 2019-06-19 NOTE — PROCEDURES
[facility]  [address] [facility], [address]   [place], [address]  [phone]                    Colonoscopy Procedure Note      Tito Falk  1955  [id number]                Date of Procedure: 6/19/2019    Preoperative diagnosis: Z12.11,  Colon cancer Screening    Postoperative diagnosis: rectal polyp    :  Laxmi Wetzel MD    Assistant(s): Endoscopy RN-1: Mick Soto RN; Delmy Doran RN    Sedation: MAC    Complications: None    Implants: None    Procedure Details:  Prior to the procedure, a history and physical were performed. The patients medications, allergies and sensitivities were reviewed and all questions were answered. After informed consent was obtained for the procedure, with all risks and benefits of procedure explained. The patient was taken to the endoscopy suite and placed in the left lateral decubitus position. Patient identification and proposed procedure were verified prior to the procedure by the nurse and I. After sequential anesthesia administered by anesthesiologist, a digital rectal exam was performed and was normal.  The Olympus video colonoscope was introduced through the anus and advanced to cecum, which was identified by the ileocecal valve and appendiceal orifice. The quality of preparation was good. The colonoscope was slowly withdrawn and the mucosa examined for any abnormalities. Cecal withdrawal time was greater than 6 minutes. The patient tolerated the procedure well. There were no complications. Findings/Interventions:   Polyps - #1, 5 mm in size, located in the rectum, removed by cold biopsy and sent for pathology    EBL: none    Recommendations: -Repeat colonoscopy in 5 years.    NO aspirin for 5 days     Discharge Disposition:  Yesica Clinton MD  6/19/2019  11:45 AM

## 2019-07-02 DIAGNOSIS — M1A.09X0 CHRONIC GOUT OF MULTIPLE SITES, UNSPECIFIED CAUSE: ICD-10-CM

## 2019-07-02 NOTE — TELEPHONE ENCOUNTER
Pharmacy is requesting a refill for the medication Allopurinol 300 mg pt has an appointment on 07/09/2019 with Dr. Sandro Young

## 2019-07-03 RX ORDER — ALLOPURINOL 300 MG/1
300 TABLET ORAL DAILY
Qty: 90 TAB | Refills: 6 | Status: SHIPPED | OUTPATIENT
Start: 2019-07-03 | End: 2020-07-11

## 2019-07-10 ENCOUNTER — TELEPHONE (OUTPATIENT)
Dept: SURGERY | Age: 64
End: 2019-07-10

## 2019-07-10 NOTE — TELEPHONE ENCOUNTER
----- Message from Sabino Pryor MD sent at 6/21/2019  4:06 PM EDT -----  Benign polyp(s). Repeat colonoscopy in 5 years as planned. Attempted to call patient x 2. No voicemail available on mobile number and busy signal on home number. [facility] in [place] for 5 years.

## 2019-07-11 ENCOUNTER — OFFICE VISIT (OUTPATIENT)
Dept: ORTHOPEDIC SURGERY | Facility: CLINIC | Age: 64
End: 2019-07-11

## 2019-07-11 VITALS
OXYGEN SATURATION: 99 % | HEART RATE: 96 BPM | SYSTOLIC BLOOD PRESSURE: 130 MMHG | WEIGHT: 273.2 LBS | DIASTOLIC BLOOD PRESSURE: 77 MMHG | HEIGHT: 70 IN | BODY MASS INDEX: 39.11 KG/M2 | TEMPERATURE: 97.9 F

## 2019-07-11 DIAGNOSIS — Z96.651 S/P REVISION OF TOTAL KNEE, RIGHT: ICD-10-CM

## 2019-07-11 DIAGNOSIS — M25.561 RIGHT KNEE PAIN, UNSPECIFIED CHRONICITY: ICD-10-CM

## 2019-07-11 DIAGNOSIS — T84.012A FAILED TOTAL RIGHT KNEE REPLACEMENT, INITIAL ENCOUNTER (HCC): Primary | ICD-10-CM

## 2019-07-11 RX ORDER — TRAMADOL HYDROCHLORIDE 50 MG/1
50 TABLET ORAL
Qty: 21 TAB | Refills: 0 | Status: SHIPPED | OUTPATIENT
Start: 2019-07-11 | End: 2019-07-18

## 2019-07-11 NOTE — PROGRESS NOTES
Patient: Mary Cardoso                MRN: [id number]       SSN: [id number]  YOB: 1955        AGE: 59 y.o. SEX: female  Body mass index is 39.77 kg/m². PCP: Jules Conner MD  07/11/19    History:  I had the pleasure of seeing Ms. Lilliam Jones. Ms. Lilliam Jones had her index total knee replacement done by Dr. Jaz Jesus and had some problems, ended up with a revision femoral component, Adore. I tried revising her about a year ago and the knee was full of scar tissue. She had been worked up for infection, which was negative and I was unable to get the femoral component out. 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 I think she will require femoral osteotomy to remove it. We cleaned the scar tissue out and her knee actually felt a lot better for close to a year or so. She still works and is on her feet all day long, and she is noting more anterior thigh discomfort, not too much start-up discomfort. It is more activity-related and by the early part of the day she is having some burning and pain in the thigh, especially proximolaterally. Previous x-rays even a year ago had confirmed that the stem had shifted with the distal tip being more medially displaced. Her left knee also is a revision constrained implant and starting to loosen as well but not grossly loose like on the right side. Physical examination:  On examination today she has nicely healed incisions. She has some touch of mid flexion instability, not severely so. Quadriceps are intact. Calf nontender. Bends about 95 to 100 degrees and full extension. Neurologically intact. No cyanosis, peripheral edema or clubbing. Review of systems:  All 12-point systems reviewed and pertinents documented. All other systems were negative. Assessment:  She is a very tough lady.   She is still working. I thinks she needs a revision. Plan:  I am going to send her up to Dr. Maximilian Beatty. I would be very happy to look after her after the surgery. She is a very nice lady. A prescription for tramadol today. cc:  _______________           Marcelo Ruiz MD        REVIEW OF SYSTEMS:      CON: negative for weight loss, fever  EYE: negative for double vision  ENT: negative for hoarseness  RS:   negative for Tb  GI:    negative for blood in stool  :  negative for blood in urine  Other systems reviewed and noted below. Past Medical History:   Diagnosis Date    Arthritis     Elevated cholesterol     Hypertension     Joint pain     knees       Family History   Problem Relation Age of Onset    Diabetes Mother     Colon Polyps Mother     Dementia Mother     Heart Disease Father     Hypertension Brother        Current Outpatient Medications   Medication Sig Dispense Refill    allopurinol (ZYLOPRIM) 300 mg tablet Take 1 Tab by mouth daily. Indications: treatment to prevent acute gout attack 90 Tab 6    atorvastatin (LIPITOR) 20 mg tablet TAKE 1 TABLET BY MOUTH DAILY 90 Tab 3    losartan-hydroCHLOROthiazide (HYZAAR) 100-25 mg per tablet TAKE 1 TABLET BY MOUTH DAILY. 90 Tab 3    furosemide (LASIX) 20 mg tablet TAKE 1 TAB BY MOUTH DAILY AS NEEDED. INDICATIONS: SWELLING 90 Tab 3    fexofenadine (ALLEGRA) 180 mg tablet TAKE 1 TABLET BY MOUTH DAILY 90 Tab 3    morinda citrifolia fruit (ELIO) 250 mg cap Take 1 Cap by mouth daily.  amLODIPine (NORVASC) 10 mg tablet TAKE 1 TABLET BY MOUTH DAILY 90 Tab 3    MULTIVIT-MIN/FA/CALCIUM/VIT K1 (ONE-A-DAY WOMEN'S 50+ PO) Take 1 Tab by mouth daily.  Magnesium Oxide 500 mg cap Take 400 mg by mouth daily.          No Known Allergies    Past Surgical History:   Procedure Laterality Date    COLONOSCOPY N/A 6/19/2019    COLONOSCOPY with polyp bx performed by [facility] Quiroz [facility] ENDOSCOPY    HX HYSTERECTOMY      HX KNEE REPLACEMENT Left     HX ORTHOPAEDIC Right Feb, 2016    TKR    HX TONSILLECTOMY         Social History     Socioeconomic History    Marital status: SINGLE     Spouse name: Not on file    Number of children: Not on file    Years of education: Not on file    Highest education level: Not on file   Occupational History    Not on file   Social Needs    Financial resource strain: Not on file    Food insecurity:     Worry: Not on file     Inability: Not on file    Transportation needs:     Medical: Not on file     Non-medical: Not on file   Tobacco Use    Smoking status: Never Smoker    Smokeless tobacco: Never Used   Substance and Sexual Activity    Alcohol use: No    Drug use: No    Sexual activity: Yes     Partners: Male     Birth control/protection: None   Lifestyle    Physical activity:     Days per week: Not on file     Minutes per session: Not on file    Stress: Not on file   Relationships    Social connections:     Talks on phone: Not on file     Gets together: Not on file     Attends Anabaptist service: Not on file     Active member of club or organization: Not on file     Attends meetings of clubs or organizations: Not on file     Relationship status: Not on file    Intimate partner violence:     Fear of current or ex partner: Not on file     Emotionally abused: Not on file     Physically abused: Not on file     Forced sexual activity: Not on file   Other Topics Concern     Service No    Blood Transfusions No    Caffeine Concern Yes    Occupational Exposure No    Hobby Hazards No    Sleep Concern No    Stress Concern No    Weight Concern No    Special Diet No    Back Care No    Exercise No    Bike Helmet No    Seat Belt Yes    Self-Exams Yes   Social History Narrative    Not on file       Visit Vitals  /77 (BP 1 Location: Left arm, BP Patient Position: Sitting)   Pulse 96   Temp 97.9 °F (36.6 °C) (Oral)   Ht 5' 9.5\" (1.765 m)   Wt 273 lb 3.2 oz (123.9 kg)   SpO2 99%   BMI 39.77 kg/m²         PHYSICAL EXAMINATION:  GENERAL: Alert and oriented x3, in no acute distress, well-developed, well-nourished, afebrile. HEART: No JVD. EYES: No scleral icterus   NECK: No significant lymphadenopathy   LUNGS: No respiratory compromise or indrawing  ABDOMEN: Soft, non-tender, non-distended. Electronically signed by:  Mariajose Ortiz MD

## 2019-07-16 ENCOUNTER — OFFICE VISIT (OUTPATIENT)
Dept: FAMILY MEDICINE CLINIC | Facility: CLINIC | Age: 64
End: 2019-07-16

## 2019-07-16 VITALS
RESPIRATION RATE: 16 BRPM | SYSTOLIC BLOOD PRESSURE: 158 MMHG | WEIGHT: 274 LBS | HEIGHT: 70 IN | TEMPERATURE: 97.8 F | OXYGEN SATURATION: 97 % | BODY MASS INDEX: 39.22 KG/M2 | HEART RATE: 81 BPM | DIASTOLIC BLOOD PRESSURE: 79 MMHG

## 2019-07-16 DIAGNOSIS — E78.5 HYPERLIPIDEMIA, UNSPECIFIED HYPERLIPIDEMIA TYPE: ICD-10-CM

## 2019-07-16 DIAGNOSIS — M79.89 LEG SWELLING: ICD-10-CM

## 2019-07-16 DIAGNOSIS — M10.9 GOUT INVOLVING TOE, UNSPECIFIED CAUSE, UNSPECIFIED CHRONICITY, UNSPECIFIED LATERALITY: Primary | ICD-10-CM

## 2019-07-16 DIAGNOSIS — I10 ESSENTIAL HYPERTENSION: ICD-10-CM

## 2019-07-16 DIAGNOSIS — Z12.39 SCREENING FOR BREAST CANCER: ICD-10-CM

## 2019-07-16 DIAGNOSIS — E66.01 SEVERE OBESITY (BMI 35.0-39.9) WITH COMORBIDITY (HCC): ICD-10-CM

## 2019-07-16 RX ORDER — FUROSEMIDE 20 MG/1
TABLET ORAL
Qty: 90 TAB | Refills: 6 | Status: SHIPPED | OUTPATIENT
Start: 2019-07-16 | End: 2020-07-23

## 2019-07-16 RX ORDER — AMLODIPINE BESYLATE 10 MG/1
TABLET ORAL
Qty: 90 TAB | Refills: 6 | Status: SHIPPED | OUTPATIENT
Start: 2019-07-16 | End: 2020-07-21

## 2019-07-16 RX ORDER — CELECOXIB 200 MG/1
CAPSULE ORAL 2 TIMES DAILY
COMMUNITY
End: 2021-05-26 | Stop reason: SDUPTHER

## 2019-07-16 NOTE — PATIENT INSTRUCTIONS
Gout: Care Instructions Your Care Instructions Gout is a form of arthritis caused by a buildup of uric acid crystals in a joint. It causes sudden attacks of pain, swelling, redness, and stiffness, usually in one joint, especially the big toe. Gout usually comes on without a cause. But it can be brought on by drinking alcohol (especially beer) or eating seafood and red meat. Taking certain medicines, such as diuretics or aspirin, also can bring on an attack of gout. Taking your medicines as prescribed and following up with your doctor regularly can help you avoid gout attacks in the future. Follow-up care is a key part of your treatment and safety. Be sure to make and go to all appointments, and call your doctor if you are having problems. It's also a good idea to know your test results and keep a list of the medicines you take. How can you care for yourself at home? · If the joint is swollen, put ice or a cold pack on the area for 10 to 20 minutes at a time. Put a thin cloth between the ice and your skin. · Prop up the sore limb on a pillow when you ice it or anytime you sit or lie down during the next 3 days. Try to keep it above the level of your heart. This will help reduce swelling. · Rest sore joints. Avoid activities that put weight or strain on the joints for a few days. Take short rest breaks from your regular activities during the day. · Take your medicines exactly as prescribed. Call your doctor if you think you are having a problem with your medicine. · Take pain medicines exactly as directed. ? If the doctor gave you a prescription medicine for pain, take it as prescribed. ? If you are not taking a prescription pain medicine, ask your doctor if you can take an over-the-counter medicine. · Eat less seafood and red meat. · Check with your doctor before drinking alcohol. · Losing weight, if you are overweight, may help reduce attacks of gout. But do not go on a [employer]. \" Losing a lot of weight in a short amount of time can cause a gout attack. When should you call for help? Call your doctor now or seek immediate medical care if: 
  · You have a fever.  
  · The joint is so painful you cannot use it.  
  · You have sudden, unexplained swelling, redness, warmth, or severe pain in one or more joints.  
 Watch closely for changes in your health, and be sure to contact your doctor if: 
  · You have joint pain.  
  · Your symptoms get worse or are not improving after 2 or 3 days. Where can you learn more? Go to http://nahid-maureen.info/. Enter W683 in the search box to learn more about \"Gout: Care Instructions. \" Current as of: Martita 10, 2018 Content Version: 11.9 © 1146-3352 [employer]. Care instructions adapted under license by [employer] (which disclaims liability or warranty for this information). If you have questions about a medical condition or this instruction, always ask your healthcare professional. [address] any warranty or liability for your use of this information.

## 2019-07-16 NOTE — PROGRESS NOTES
07/16/19  11:05 AM  had concerns including Establish Care (HX of HTN Cholesterol Gout Osteoarthritis of Knee Room 9) and Medication Refill. HISTORY OF PRESENT ILLNESS   This is a 59 y.o. female who presents to [facility] care with us. She is a former patient of a prior provider at this facility   Osteoarthritis  She has a history of prior surgery in the right thigh and has some aching in that area. She states that the radha may need to be removed. Addition some loosening of hardware in the left knee was mentioned to her by her orthopedist.  Due for a repeat surgery. This will be done in [place]. Hypertension  It has done well, she had been out of one of the meds and the patient has no headache lightheadedness chest pain or pressure shortness of breath orthopnea edema nausea weakness or paresthesias  She works a lot at the [employer],  Gout   Still on the pills. No recent attacks are admitted to as long as she is on the maintenance medications. Hyperlipidemia  Doing well also. Lord Lund She has no problem with the medications. Insert negative hyperlipidemia. Wellness  Colonoscopy done  Shingrix due need to find out where he can be administered. Current Outpatient Medications:     celecoxib (CELEBREX) 200 mg capsule, Take  by mouth two (2) times a day., Disp: , Rfl:     traMADol (ULTRAM) 50 mg tablet, Take 1 Tab by mouth every eight (8) hours as needed for Pain for up to 7 days. Max Daily Amount: 150 mg., Disp: 21 Tab, Rfl: 0    allopurinol (ZYLOPRIM) 300 mg tablet, Take 1 Tab by mouth daily. Indications: treatment to prevent acute gout attack, Disp: 90 Tab, Rfl: 6    atorvastatin (LIPITOR) 20 mg tablet, TAKE 1 TABLET BY MOUTH DAILY, Disp: 90 Tab, Rfl: 3    losartan-hydroCHLOROthiazide (HYZAAR) 100-25 mg per tablet, TAKE 1 TABLET BY MOUTH DAILY. , Disp: 90 Tab, Rfl: 3    furosemide (LASIX) 20 mg tablet, TAKE 1 TAB BY MOUTH DAILY AS NEEDED.  INDICATIONS: SWELLING, Disp: 90 Tab, Rfl: 3    fexofenadine (ALLEGRA) 180 mg tablet, TAKE 1 TABLET BY MOUTH DAILY, Disp: 90 Tab, Rfl: 3    morinda citrifolia fruit (ELIO) 250 mg cap, Take 1 Cap by mouth daily. , Disp: , Rfl:     amLODIPine (NORVASC) 10 mg tablet, TAKE 1 TABLET BY MOUTH DAILY, Disp: 90 Tab, Rfl: 3    MULTIVIT-MIN/FA/CALCIUM/VIT K1 (ONE-A-DAY WOMEN'S 50+ PO), Take 1 Tab by mouth daily. , Disp: , Rfl:     Magnesium Oxide 500 mg cap, Take 400 mg by mouth daily. , Disp: , Rfl:     No Known Allergies    Active Ambulatory Problems     Diagnosis Date Noted    Mild chronic anemia 05/20/2015    Impaired glucose tolerance 01/04/2016    Essential hypertension 01/04/2016    Obesity (BMI 30-39.9) 01/04/2016    Hyperlipidemia 02/01/2016    Primary osteoarthritis of right knee 02/08/2016    Severe obesity (BMI 35.0-39. 9) with comorbidity ([facility] [address] 03/22/2018    Failed total knee replacement ([facility] [address] 05/02/2018    Gout involving toe 11/19/2018     Resolved Ambulatory Problems     Diagnosis Date Noted    No Resolved Ambulatory Problems     Past Medical History:   Diagnosis Date    Arthritis     Elevated cholesterol     Hypertension     Joint pain        Review of Systems   Constitutional: Negative for chills and fever. HENT: Negative for hearing loss. Eyes: Negative for blurred vision, discharge and redness. Respiratory: Negative for cough and shortness of breath. Cardiovascular: Negative for chest pain, palpitations, orthopnea and leg swelling. Gastrointestinal: Negative for abdominal pain, blood in stool, constipation, diarrhea and melena. Genitourinary: Negative for dysuria, frequency and urgency. Musculoskeletal: Negative for joint pain and myalgias. Skin: Negative for rash. Neurological: Negative for dizziness, focal weakness and headaches. Psychiatric/Behavioral: Negative for depression. The patient is not nervous/anxious. Results for orders placed or performed during the hospital encounter of 08/20/18   [address] [place] [address].    Result Value Ref Range    [facility]         Visit Vitals  /79   Pulse 81   Temp 97.8 °F (36.6 °C) (Oral)   Resp 16   Ht 5' 9.5\" (1.765 m)   Wt 274 lb (124.3 kg)   SpO2 97%   BMI 39.88 kg/m²       Physical Exam   Constitutional: She is oriented to person, place, and time. She appears well-developed and well-nourished. Body mass index is 39.88 kg/m². HENT:   Head: Normocephalic and atraumatic. Right Ear: External ear normal.   Left Ear: External ear normal.   Mouth/Throat: Oropharynx is clear and moist.   Eyes: Pupils are equal, round, and reactive to light. Conjunctivae and EOM are normal. No scleral icterus. Neck: No JVD present. No thyromegaly present. Cardiovascular: Normal rate, regular rhythm, normal heart sounds and intact distal pulses. Pulmonary/Chest: Effort normal and breath sounds normal.   Abdominal: Bowel sounds are normal. She exhibits no distension. There is no tenderness. Musculoskeletal: Normal range of motion. She exhibits no edema or deformity. Lymphadenopathy:     She has no cervical adenopathy. Neurological: She is alert and oriented to person, place, and time. No cranial nerve deficit or sensory deficit. She exhibits normal muscle tone. Coordination normal.   Skin: Skin is dry. Capillary refill takes less than 2 seconds. No rash noted. No erythema. No pallor. Psychiatric: She has a normal mood and affect. Her behavior is normal. Judgment and thought content normal.       MDM  Number of Diagnoses or Management Options  Essential hypertension:   Gout involving toe, unspecified cause, unspecified chronicity, unspecified laterality:   Hyperlipidemia, unspecified hyperlipidemia type:   Leg swelling:   Screening for breast cancer:   Severe obesity (BMI 35.0-39. 9) with comorbidity [facility]):   Diagnosis management comments: The patient has essential hypertension which is slightly elevated today.   This is her first visit with us and she had been out of her diuretic. We will recheck at next visit. Baseline labs have been ordered. She has hyperlipidemia and repeat labs have been ordered. His leg swelling which is more likely due to the orthopedic problems that she has been presenting with to her orthopedist.  Will follow. Screening for breast cancer has been ordered. The patient has gout but has had no recent attacks.,  Follow. We discussed diet and exercise. All questions were answered and understood. ASSESSMENT and PLAN    ICD-10-CM ICD-9-CM    1. Gout involving toe, unspecified cause, unspecified chronicity, unspecified laterality M10.9 274.9    2. Leg swelling M79.89 729.81 furosemide (LASIX) 20 mg tablet      CBC WITH AUTOMATED DIFF    Will restart the diuretic. This may be complicated by the prior surgery in the right leg. 3. Essential hypertension I10 401.9 amLODIPine (NORVASC) 10 mg tablet      CBC WITH AUTOMATED DIFF      METABOLIC PANEL, BASIC    Fair control all meds have been restarted labs to be rechecked   4. Severe obesity (BMI 35.0-39. 9) with comorbidity ([facility] [address] E66.01 278.01     Diet discussed unable to exercise a lot now because of both work and pain in the right thigh and left knee   5. Hyperlipidemia, unspecified hyperlipidemia type E78.5 272.4 LIPID PANEL    Follow-up labs ordered continue present medications   6. Screening for breast cancer Z12.31 V76.10 [facility] MAMMO BI SCREENING INCL CAD    Mammogram ordered     Follow-up and Dispositions    · Return in about 3 months (around 10/16/2019).          Reviewed diet, exercise and weight control

## 2019-07-16 NOTE — PROGRESS NOTES
Chief Complaint   Patient presents with    Establish Care     HX of HTN Cholesterol Gout Osteoarthritis of Knee Room 9    Medication Refill

## 2019-07-17 LAB
ABSOLUTE LYMPHOCYTE COUNT, 10803: 1.9 K/UL (ref 1–4.8)
ANION GAP SERPL CALC-SCNC: 11 MMOL/L
BASOPHILS # BLD: 0 K/UL (ref 0–0.2)
BASOPHILS NFR BLD: 0 % (ref 0–2)
BUN SERPL-MCNC: 11 MG/DL (ref 6–22)
CALCIUM SERPL-MCNC: 10.2 MG/DL (ref 8.4–10.5)
CHLORIDE SERPL-SCNC: 101 MMOL/L (ref 98–110)
CHOLEST SERPL-MCNC: 126 MG/DL (ref 110–200)
CO2 SERPL-SCNC: 30 MMOL/L (ref 20–32)
CREAT SERPL-MCNC: 0.6 MG/DL (ref 0.8–1.4)
EOSINOPHIL # BLD: 0.1 K/UL (ref 0–0.5)
EOSINOPHIL NFR BLD: 2 % (ref 0–6)
ERYTHROCYTE [DISTWIDTH] IN BLOOD BY AUTOMATED COUNT: 14.1 % (ref 10–15.5)
GFRAA, 66117: >60
GFRNA, 66118: >60
GLUCOSE SERPL-MCNC: 115 MG/DL (ref 70–99)
GRANULOCYTES,GRANS: 67 % (ref 40–75)
HCT VFR BLD AUTO: 34.7 % (ref 35.1–48)
HDLC SERPL-MCNC: 2.2 MG/DL (ref 0–5)
HDLC SERPL-MCNC: 57 MG/DL (ref 40–59)
HGB BLD-MCNC: 11.2 G/DL (ref 11.7–16)
LDLC SERPL CALC-MCNC: 52 MG/DL (ref 50–99)
LYMPHOCYTES, LYMLT: 25 % (ref 20–45)
MCH RBC QN AUTO: 27 PG (ref 26–34)
MCHC RBC AUTO-ENTMCNC: 32 G/DL (ref 31–36)
MCV RBC AUTO: 84 FL (ref 80–95)
MONOCYTES # BLD: 0.5 K/UL (ref 0.1–1)
MONOCYTES NFR BLD: 6 % (ref 3–12)
NEUTROPHILS # BLD AUTO: 4.9 K/UL (ref 1.8–7.7)
PLATELET # BLD AUTO: 334 K/UL (ref 140–440)
PMV BLD AUTO: 9.6 FL (ref 9–13)
POTASSIUM SERPL-SCNC: 3.8 MMOL/L (ref 3.5–5.5)
RBC # BLD AUTO: 4.12 M/UL (ref 3.8–5.2)
SODIUM SERPL-SCNC: 142 MMOL/L (ref 133–145)
TRIGL SERPL-MCNC: 84 MG/DL (ref 40–149)
VLDLC SERPL CALC-MCNC: 17 MG/DL (ref 8–30)
WBC # BLD AUTO: 7.4 K/UL (ref 4–11)

## 2019-07-17 NOTE — PROGRESS NOTES
Please call she has a mild anemia which she has had before otherwise negative.   No change in therapy

## 2019-07-18 NOTE — PROGRESS NOTES
Please call her triglycerides and cholesterol is goodHis sugar is 115 which is adequate just very slightly above normal sugar the rest of her chemistries being normal

## 2019-10-16 ENCOUNTER — OFFICE VISIT (OUTPATIENT)
Dept: FAMILY MEDICINE CLINIC | Facility: CLINIC | Age: 64
End: 2019-10-16

## 2019-10-16 VITALS
SYSTOLIC BLOOD PRESSURE: 155 MMHG | HEIGHT: 70 IN | OXYGEN SATURATION: 96 % | DIASTOLIC BLOOD PRESSURE: 91 MMHG | WEIGHT: 276 LBS | TEMPERATURE: 98.7 F | HEART RATE: 85 BPM | RESPIRATION RATE: 18 BRPM | BODY MASS INDEX: 39.51 KG/M2

## 2019-10-16 DIAGNOSIS — Z23 ENCOUNTER FOR IMMUNIZATION: ICD-10-CM

## 2019-10-16 DIAGNOSIS — M17.11 PRIMARY OSTEOARTHRITIS OF RIGHT KNEE: ICD-10-CM

## 2019-10-16 DIAGNOSIS — I10 ESSENTIAL HYPERTENSION: ICD-10-CM

## 2019-10-16 DIAGNOSIS — M79.89 LEG SWELLING: ICD-10-CM

## 2019-10-16 DIAGNOSIS — M79.89 LEG SWELLING: Primary | ICD-10-CM

## 2019-10-16 DIAGNOSIS — R06.00 DYSPNEA, UNSPECIFIED TYPE: ICD-10-CM

## 2019-10-16 NOTE — PATIENT INSTRUCTIONS
Vaccine Information Statement    Influenza (Flu) Vaccine (Inactivated or Recombinant): What You Need to Know    Many Vaccine Information Statements are available in [place] and other languages. See www.immunize.org/vis  Hojas de información sobre vacunas están disponibles en español y en muchos otros idiomas. Visite www.immunize.org/vis    1. Why get vaccinated? Influenza vaccine can prevent influenza (flu). Flu is a contagious disease that spreads around the [facility] every year, usually between October and May. Anyone can get the flu, but it is more dangerous for some people. Infants and young children, people 72years of age and older, pregnant women, and people with certain health conditions or a weakened immune system are at greatest risk of flu complications. Pneumonia, bronchitis, sinus infections and ear infections are examples of flu-related complications. If you have a medical condition, such as heart disease, cancer or diabetes, flu can make it worse. Flu can cause fever and chills, sore throat, muscle aches, fatigue, cough, headache, and runny or stuffy nose. Some people may have vomiting and diarrhea, though this is more common in children than adults. Each year thousands of people in the [facility] die from flu, and many more are hospitalized. Flu vaccine prevents millions of illnesses and flu-related visits to the doctor each year. 2. Influenza vaccines     CDC recommends everyone 10months of age and older get vaccinated every flu season. Children 6 months through 6years of age may need 2 doses during a single flu season. Everyone else needs only 1 dose each flu season. It takes about 2 weeks for protection to develop after vaccination. There are many flu viruses, and they are always changing. Each year a new flu vaccine is made to protect against three or four viruses that are likely to cause disease in the upcoming flu season.  Even when the vaccine doesnt exactly match these viruses, it may still provide some protection. Influenza vaccine does not cause flu. Influenza vaccine may be given at the same time as other vaccines. 3. Talk with your health care provider    Tell your vaccine provider if the person getting the vaccine:   Has had an allergic reaction after a previous dose of influenza vaccine, or has any severe, life-threatening allergies.  Has ever had Guillain-Barré Syndrome (also called GBS). In some cases, your health care provider may decide to postpone influenza vaccination to a future visit. People with minor illnesses, such as a cold, may be vaccinated. People who are moderately or severely ill should usually wait until they recover before getting influenza vaccine. Your health care provider can give you more information. 4. Risks of a reaction     Soreness, redness, and swelling where shot is given, fever, muscle aches, and headache can happen after influenza vaccine.  There may be a very small increased risk of Guillain-Barré Syndrome (GBS) after inactivated influenza vaccine (the flu shot). [facility] children who get the flu shot along with pneumococcal vaccine (PCV13), and/or DTaP vaccine at the same time might be slightly more likely to have a seizure caused by fever. Tell your health care provider if a child who is getting flu vaccine has ever had a seizure. People sometimes faint after medical procedures, including vaccination. Tell your provider if you feel dizzy or have vision changes or ringing in the ears. As with any medicine, there is a very remote chance of a vaccine causing a severe allergic reaction, other serious injury, or death. 5. What if there is a serious problem? An allergic reaction could occur after the vaccinated person leaves the clinic.  If you see signs of a severe allergic reaction (hives, swelling of the face and throat, difficulty breathing, a fast heartbeat, dizziness, or weakness), call 9-1-1 and get the person to the nearest hospital.    For other signs that concern you, call your health care provider. Adverse reactions should be reported to the Vaccine Adverse Event Reporting System (VAERS). Your health care provider will usually file this report, or you can do it yourself. Visit the VAERS website at www.vaers. [facility].gov or call 8-973.709.3091. VAERS is only for reporting reactions, and VAERS staff do not give medical advice. 6. The National Vaccine Injury Compensation Program    The [facility] Vaccine Injury Compensation Program (VICP) is a federal program that was created to compensate people who may have been injured by certain vaccines. Visit the VICP website at www.[facility].gov/vaccinecompensation or call 8-387.354.3122 to learn about the program and about filing a claim. There is a time limit to file a claim for compensation. 7. How can I learn more?  Ask your health care provider.  Call your local or state health department.  Contact the Centers for Disease Control and Prevention (CDC):  - Call 7-881.216.1253 (1-800-CDC-INFO) or  - Visit CDCs influenza website at www.cdc.gov/flu    Vaccine Information Statement (Interim)  Inactivated Influenza Vaccine   8/15/2019  42 MICKIE Mullerin [place] [id number]   Department of Health and Human Services  Centers for Disease Control and Prevention    Office Use Only         Leg and Ankle Edema: Care Instructions  Your Care Instructions  Swelling in the legs, ankles, and feet is called edema. It is common after you sit or stand for a while. Long plane flights or car rides often cause swelling in the legs and feet. You may also have swelling if you have to stand for long periods of time at your job. Problems with the veins in the legs (varicose veins) and changes in hormones can also cause swelling.  Sometimes the swelling in the ankles and feet is caused by a more serious problem, such as heart failure, infection, blood clots, or liver or kidney disease. Follow-up care is a key part of your treatment and safety. Be sure to make and go to all appointments, and call your doctor if you are having problems. It's also a good idea to know your test results and keep a list of the medicines you take. How can you care for yourself at home? · If your doctor gave you medicine, take it as prescribed. Call your doctor if you think you are having a problem with your medicine. · Whenever you are resting, raise your legs up. Try to keep the swollen area higher than the level of your heart. · Take breaks from standing or sitting in one position. ? Walk around to increase the blood flow in your lower legs. ? Move your feet and ankles often while you stand, or tighten and relax your leg muscles. · Wear support stockings. Put them on in the morning, before swelling gets worse. · Eat a balanced diet. Lose weight if you need to. · Limit the amount of salt (sodium) in your diet. Salt holds fluid in the body and may increase swelling. When should you call for help? Call 911 anytime you think you may need emergency care. For example, call if:    · You have symptoms of a blood clot in your lung (called a pulmonary embolism). These may include:  ? Sudden chest pain. ? Trouble breathing. ? Coughing up blood.    Call your doctor now or seek immediate medical care if:    · You have signs of a blood clot, such as:  ? Pain in your calf, back of the knee, thigh, or groin. ? Redness and swelling in your leg or groin.     · You have symptoms of infection, such as:  ? Increased pain, swelling, warmth, or redness. ? Red streaks or pus. ? A fever.    Watch closely for changes in your health, and be sure to contact your doctor if:    · Your swelling is getting worse.     · You have new or worsening pain in your legs.     · You do not get better as expected. Where can you learn more? Go to http://nahid-maureen.info/.   Enter F568 in the search box to learn more about \"Leg and Ankle Edema: Care Instructions. \"  Current as of: June 26, 2019  Content Version: 12.2  © 0956-8446 [employer]. Care instructions adapted under license by [employer] (which disclaims liability or warranty for this information). If you have questions about a medical condition or this instruction, always ask your healthcare professional. [address] any warranty or liability for your use of this information. High Blood Pressure: Care Instructions  Overview    It's normal for blood pressure to go up and down throughout the day. But if it stays up, you have high blood pressure. Another name for high blood pressure is hypertension. Despite what a lot of people think, high blood pressure usually doesn't cause headaches or make you feel dizzy or lightheaded. It usually has no symptoms. But it does increase your risk of stroke, heart attack, and other problems. You and your doctor will talk about your risks of these problems based on your blood pressure. Your doctor will give you a goal for your blood pressure. Your goal will be based on your health and your age. Lifestyle changes, such as eating healthy and being active, are always important to help lower blood pressure. You might also take medicine to reach your blood pressure goal.  Follow-up care is a key part of your treatment and safety. Be sure to make and go to all appointments, and call your doctor if you are having problems. It's also a good idea to know your test results and keep a list of the medicines you take. How can you care for yourself at home? Medical treatment  · If you stop taking your medicine, your blood pressure will go back up. You may take one or more types of medicine to lower your blood pressure. Be safe with medicines. Take your medicine exactly as prescribed. Call your doctor if you think you are having a problem with your medicine.   · Talk to your doctor before you start taking aspirin every day. Aspirin can help certain people lower their risk of a heart attack or stroke. But taking aspirin isn't right for everyone, because it can cause serious bleeding. · See your doctor regularly. You may need to see the doctor more often at first or until your blood pressure comes down. · If you are taking blood pressure medicine, talk to your doctor before you take decongestants or anti-inflammatory medicine, such as ibuprofen. Some of these medicines can raise blood pressure. · Learn how to check your blood pressure at home. Lifestyle changes  · Stay at a healthy weight. This is especially important if you put on weight around the waist. Losing even 10 pounds can help you lower your blood pressure. · If your doctor recommends it, get more exercise. Walking is a good choice. Bit by bit, increase the amount you walk every day. Try for at least 30 minutes on most days of the week. You also may want to swim, bike, or do other activities. · Avoid or limit alcohol. Talk to your doctor about whether you can drink any alcohol. · Try to limit how much sodium you eat to less than 2,300 milligrams (mg) a day. Your doctor may ask you to try to eat less than 1,500 mg a day. · Eat plenty of fruits (such as bananas and oranges), vegetables, legumes, whole grains, and low-fat dairy products. · Lower the amount of saturated fat in your diet. Saturated fat is found in animal products such as milk, cheese, and meat. Limiting these foods may help you lose weight and also lower your risk for heart disease. · Do not smoke. Smoking increases your risk for heart attack and stroke. If you need help quitting, talk to your doctor about stop-smoking programs and medicines. These can increase your chances of quitting for good. When should you call for help? Call  911 anytime you think you may need emergency care.  This may mean having symptoms that suggest that your blood pressure is causing a serious heart or blood vessel problem. Your blood pressure may be over 180/120.   For example, call  911 if:    · You have symptoms of a heart attack. These may include:  ? Chest pain or pressure, or a strange feeling in the chest.  ? Sweating. ? Shortness of breath. ? Nausea or vomiting. ? Pain, pressure, or a strange feeling in the back, neck, jaw, or upper belly or in one or both shoulders or arms. ? Lightheadedness or sudden weakness. ? A fast or irregular heartbeat.     · You have symptoms of a stroke. These may include:  ? Sudden numbness, tingling, weakness, or loss of movement in your face, arm, or leg, especially on only one side of your body. ? Sudden vision changes. ? Sudden trouble speaking. ? Sudden confusion or trouble understanding simple statements. ? Sudden problems with walking or balance. ? A sudden, severe headache that is different from past headaches.     · You have severe back or belly pain.    Do not wait until your blood pressure comes down on its own. Get help right away.   Call your doctor now or seek immediate care if:    · Your blood pressure is much higher than normal (such as 180/120 or higher), but you don't have symptoms.     · You think high blood pressure is causing symptoms, such as:  ? Severe headache.  ? Blurry vision.    Watch closely for changes in your health, and be sure to contact your doctor if:    · Your blood pressure measures higher than your doctor recommends at least 2 times. That means the top number is higher or the bottom number is higher, or both.     · You think you may be having side effects from your blood pressure medicine. Where can you learn more? Go to http://nahid-maureen.info/. Enter K681 in the search box to learn more about \"High Blood Pressure: Care Instructions. \"  Current as of: April 9, 2019  Content Version: 12.2  © 6887-7257 [employer]  Care instructions adapted under license by [employer] (which disclaims liability or warranty for this information). If you have questions about a medical condition or this instruction, always ask your healthcare professional. [address] any warranty or liability for your use of this information.

## 2019-10-16 NOTE — PROGRESS NOTES
10/16/19  11:05 AM  had concerns including Follow Up Chronic Condition ([facility] Cholesterol Gout Room 8). HISTORY OF PRESENT ILLNESS   This is a 62 y.o. female who presents to follow-up on her hypertension, diabetes and gout. .  She is a former patient of a prior provider at this facility   Shortness of breath  She is taking the diuretic regularly. She sleeps on 2 pillows  The has to stop to rest when walking in a grocery store. Charles Wagner No chest pain , but a little wheezing is admitted to. She sleeps on 2 pillows. She denies any chest pain pressure palpitations. She has nocturia x1  Osteoarthritis  She has a history of prior surgery in the right thigh and has some aching in that area. She states that the radha may need to be removed. Addition some loosening of hardware in the left knee was mentioned to her by her orthopedist.  Due for a repeat surgery. This will be done in [place]. Hypertension  It has done well, although all she had been out of one of the meds and the patient has no headache lightheadedness chest pain or pressure shortness of breath orthopnea edema nausea weakness or paresthesias  She works a lot at the [employer],  BP Readings from Last 3 Encounters:   10/16/19 (!) 155/91   07/16/19 158/79   07/11/19 130/77     Gout   Still on the pills. No recent attacks are admitted to as long as she is on the maintenance medications. Hyperlipidemia  Doing well also. Charles Wagner She has no problem with the medications. I the patient denies muscle aches, headache, GI symptoms or difficulty with mentation     Current Outpatient Medications   Medication Sig    celecoxib (CELEBREX) 200 mg capsule Take  by mouth two (2) times a day.  furosemide (LASIX) 20 mg tablet TAKE 1 TAB BY MOUTH DAILY AS NEEDED. INDICATIONS: SWELLING    amLODIPine (NORVASC) 10 mg tablet TAKE 1 TABLET BY MOUTH DAILY  Indications: high blood pressure    allopurinol (ZYLOPRIM) 300 mg tablet Take 1 Tab by mouth daily.  Indications: treatment to prevent acute gout attack    atorvastatin (LIPITOR) 20 mg tablet TAKE 1 TABLET BY MOUTH DAILY    losartan-hydroCHLOROthiazide (HYZAAR) 100-25 mg per tablet TAKE 1 TABLET BY MOUTH DAILY.  morinda citrifolia fruit (ELIO) 250 mg cap Take 1 Cap by mouth daily.  MULTIVIT-MIN/FA/CALCIUM/VIT K1 (ONE-A-DAY WOMEN'S 50+ PO) Take 1 Tab by mouth daily.  Magnesium Oxide 500 mg cap Take 400 mg by mouth daily.  fexofenadine (ALLEGRA) 180 mg tablet TAKE 1 TABLET BY MOUTH DAILY     No current facility-administered medications for this visit. No Known Allergies    Active Ambulatory Problems     Diagnosis Date Noted    Mild chronic anemia 05/20/2015    Impaired glucose tolerance 01/04/2016    Essential hypertension 01/04/2016    Obesity (BMI 30-39.9) 01/04/2016    Hyperlipidemia 02/01/2016    Primary osteoarthritis of right knee 02/08/2016    Severe obesity (BMI 35.0-39. 9) with comorbidity ([facility] [address] 03/22/2018    Failed total knee replacement ([facility] [address] 05/02/2018    Gout involving toe 11/19/2018     Resolved Ambulatory Problems     Diagnosis Date Noted    No Resolved Ambulatory Problems     Past Medical History:   Diagnosis Date    Arthritis     Elevated cholesterol     Hypertension     Joint pain      Review of Systems   Constitutional: Negative for chills and fever. HENT: Negative for hearing loss. Eyes: Negative for blurred vision, discharge and redness. Respiratory: Negative for cough and shortness of breath. Cardiovascular: Negative for chest pain, palpitations, orthopnea and leg swelling. Gastrointestinal: Negative for abdominal pain, blood in stool, constipation, diarrhea and melena. Genitourinary: Negative for dysuria, frequency and urgency. Musculoskeletal: Negative for joint pain and myalgias. Skin: Negative for rash. Neurological: Negative for dizziness, focal weakness and headaches. Psychiatric/Behavioral: Negative for depression. The patient is not nervous/anxious.       Results for orders placed or performed in visit on 07/16/19   LIPID PANEL   Result Value Ref Range    Triglyceride 84 40 - 149 mg/dL    HDL Cholesterol 57 40 - 59 mg/dL    Cholesterol, total 126 110 - 200 mg/dL    CHOLESTEROL/HDL 2.2 0.0 - 5.0    LDL, calculated 52 50 - 99 mg/dL    VLDL, calculated 17 8 - 30 mg/dL   METABOLIC PANEL, BASIC   Result Value Ref Range    Glucose 115 (H) 70 - 99 mg/dL    BUN 11 6 - 22 mg/dL    Creatinine 0.6 (L) 0.8 - 1.4 mg/dL    Sodium 142 133 - 145 mmol/L    Potassium 3.8 3.5 - 5.5 mmol/L    Chloride 101 98 - 110 mmol/L    CO2 30 20 - 32 mmol/L    Calcium 10.2 8.4 - 10.5 mg/dL    Anion gap 11.0 mmol/L    GFRAA >60.0 >60.0    GFRNA >60.0 >60.0   CBC WITH AUTOMATED DIFF   Result Value Ref Range    WBC 7.4 4.0 - 11.0 K/uL    RBC 4.12 3.80 - 5.20 M/uL    HGB 11.2 (L) 11.7 - 16.0 g/dL    HCT 34.7 (L) 35.1 - 48.0 %    MCV 84 80 - 95 fL    MCH 27 26 - 34 pg    MCHC 32 31 - 36 g/dL    RDW 14.1 10.0 - 15.5 %    PLATELET 006 898 - 936 K/uL    MPV 9.6 9.0 - 13.0 fL    NEUTROPHILS 67 40 - 75 %    Lymphocytes 25 20 - 45 %    MONOCYTES 6 3 - 12 %    EOSINOPHILS 2 0 - 6 %    BASOPHILS 0 0 - 2 %    ABS. NEUTROPHILS 4.9 1.8 - 7.7 K/uL    ABSOLUTE LYMPHOCYTE COUNT 1.9 1.0 - 4.8 K/uL    ABS. MONOCYTES 0.5 0.1 - 1.0 K/uL    ABS. EOSINOPHILS 0.1 0.0 - 0.5 K/uL    ABS. BASOPHILS 0.0 0.0 - 0.2 K/uL       Visit Vitals  BP (!) 155/91 (BP 1 Location: Right arm, BP Patient Position: Sitting)   Pulse 85   Temp 98.7 °F (37.1 °C) (Oral)   Resp 18   Ht 5' 9.5\" (1.765 m)   Wt 276 lb (125.2 kg)   SpO2 96%   BMI 40.17 kg/m²       Physical Exam   Constitutional: She is oriented to person, place, and time. She appears well-developed and well-nourished. Body mass index is 39.88 kg/m². HENT:   Head: Normocephalic and atraumatic. Right Ear: External ear normal.   Left Ear: External ear normal.   Mouth/Throat: Oropharynx is clear and moist.   Eyes: Pupils are equal, round, and reactive to light.  Conjunctivae and EOM are normal. No scleral icterus. Neck: No JVD present. No thyromegaly present. Cardiovascular: Normal rate, regular rhythm, normal heart sounds and intact distal pulses. Pulmonary/Chest: Effort normal and breath sounds normal.   Abdominal: Bowel sounds are normal. She exhibits no distension. There is no tenderness. Musculoskeletal: Normal range of motion. She exhibits no edema or deformity. Lymphadenopathy:     She has no cervical adenopathy. Neurological: She is alert and oriented to person, place, and time. No cranial nerve deficit or sensory deficit. She exhibits normal muscle tone. Coordination normal.   Skin: Skin is dry. Capillary refill takes less than 2 seconds. No rash noted. No erythema. No pallor. Psychiatric: She has a normal mood and affect. Her behavior is normal. Judgment and thought content normal.     ASSESSMENT and PLAN    ICD-10-CM ICD-9-CM    1. Leg swelling M79.89 729.81 NT-PRO BNP      METABOLIC PANEL, COMPREHENSIVE      URINALYSIS W/MICROSCOPIC      D DIMER    Differential diagnosis includes cardiac hepatic renal and peripheral problems. Work-up initiated. Echocardiogram ordered   2. Dyspnea, unspecified type R06.00 786.09 ECHO ADULT COMPLETE      AMB POC EKG ROUTINE W/ 12 LEADS, INTER & REP      NT-PRO BNP    Will evaluate for cardiac respiratory or other etiologies. 3. Encounter for immunization Z23 V03.89 INFLUENZA VIRUS VAC QUAD,SPLIT,PRESV FREE SYRINGE IM    Influenza vaccine given   4. Primary osteoarthritis of right knee M17.11 715.16     For follow-up and treatment in [address] 5. Essential hypertension X03 946.3 METABOLIC PANEL, COMPREHENSIVE    Fair control continue present medications. Encounter Diagnoses   Name Primary?     Leg swelling Yes    Dyspnea, unspecified type     Encounter for immunization     Primary osteoarthritis of right knee     Essential hypertension        Follow-up and Dispositions    · Return in about 3 months (around 1/16/2020) for Follow up on yifan.              Reviewed diet, exercise and weight control

## 2019-10-16 NOTE — PROGRESS NOTES
Chief Complaint   Patient presents with    Follow Up Chronic Condition     HTN Cholesterol Gout Room 8

## 2019-11-07 DIAGNOSIS — I10 ESSENTIAL HYPERTENSION: ICD-10-CM

## 2019-11-07 RX ORDER — LOSARTAN POTASSIUM AND HYDROCHLOROTHIAZIDE 25; 100 MG/1; MG/1
TABLET ORAL
Qty: 90 TAB | Refills: 3 | Status: CANCELLED | OUTPATIENT
Start: 2019-11-07

## 2019-11-07 NOTE — TELEPHONE ENCOUNTER
Pharmacy is  Requesting a refill for the medication       Losartan - HCTZ  This combination is on back order but can be split

## 2019-11-10 DIAGNOSIS — I10 ESSENTIAL HYPERTENSION: Primary | ICD-10-CM

## 2019-11-10 RX ORDER — LOSARTAN POTASSIUM 100 MG/1
100 TABLET ORAL DAILY
Qty: 30 TAB | Refills: 5 | Status: SHIPPED | OUTPATIENT
Start: 2019-11-10 | End: 2020-05-10

## 2019-11-10 RX ORDER — HYDROCHLOROTHIAZIDE 25 MG/1
25 TABLET ORAL DAILY
Qty: 30 TAB | Refills: 5 | Status: SHIPPED | OUTPATIENT
Start: 2019-11-10 | End: 2020-03-29

## 2020-01-15 ENCOUNTER — OFFICE VISIT (OUTPATIENT)
Dept: FAMILY MEDICINE CLINIC | Facility: CLINIC | Age: 65
End: 2020-01-15

## 2020-01-15 VITALS
WEIGHT: 279 LBS | RESPIRATION RATE: 18 BRPM | BODY MASS INDEX: 39.94 KG/M2 | OXYGEN SATURATION: 95 % | HEART RATE: 100 BPM | DIASTOLIC BLOOD PRESSURE: 82 MMHG | HEIGHT: 70 IN | TEMPERATURE: 96.7 F | SYSTOLIC BLOOD PRESSURE: 143 MMHG

## 2020-01-15 DIAGNOSIS — M10.9 GOUT INVOLVING TOE, UNSPECIFIED CAUSE, UNSPECIFIED CHRONICITY, UNSPECIFIED LATERALITY: ICD-10-CM

## 2020-01-15 DIAGNOSIS — I10 ESSENTIAL HYPERTENSION: ICD-10-CM

## 2020-01-15 DIAGNOSIS — R06.00 DYSPNEA, UNSPECIFIED TYPE: ICD-10-CM

## 2020-01-15 DIAGNOSIS — M79.89 LEG SWELLING: Primary | ICD-10-CM

## 2020-01-15 DIAGNOSIS — E78.5 HYPERLIPIDEMIA, UNSPECIFIED HYPERLIPIDEMIA TYPE: ICD-10-CM

## 2020-01-15 NOTE — PROGRESS NOTES
Chief Complaint   Patient presents with    Follow Up Chronic Condition     HTN Cholesterol Knee pain Room 9

## 2020-01-15 NOTE — PROGRESS NOTES
01/15/20  11:05 AM  had concerns including Follow Up Chronic Condition (HTN Cholesterol Knee pain Room 9). HISTORY OF PRESENT ILLNESS   This is a 62 y.o. female who presents to follow-up on her hypertension, diabetes and gout. Last visit 3 months ago for leg swelling, dyspnea primary osteoarthritis of the right knee and essential hypertension. Her July labs were reviewed her glucose was 115 but the remainder the labs were normal including the cholesterol. Shortness of breath  She is taking the diuretic regularly. The has to stop to rest when walking in a grocery store. Hailey Wade No chest pain, chest pressure or palpitations, but a little wheezing is admitted to. She sleeps on 2 pillows. She denies any chest pain pressure palpitations. She has nocturia x1  Osteoarthritis  She states she will call lety Judd for follow up  She has a history of prior surgery in the right thigh and has some aching in that area. She states that the radha may need to be removed. Addition some loosening of hardware in the left knee was mentioned to her by her orthopedist.  Due for a repeat surgery. This will be done in [place]. Hypertension  It has done well, although all she had been out of one of the meds and the patient has no headache lightheadedness chest pain or pressure shortness of breath orthopnea edema nausea weakness or paresthesias  She works a lot at the [employer],  BP Readings from Last 3 Encounters:   01/15/20 143/82   10/16/19 (!) 155/91   07/16/19 158/79     Gout   No recent gout attacks  Still on the pills. No recent attacks are admitted to as long as she is on the maintenance medications. Hyperlipidemia  Doing well also. Hailey Wade She has no problem with the medications.   I the patient denies muscle aches, headache, GI symptoms or difficulty with mentation   Lab Results   Component Value Date/Time    Cholesterol, total 126 07/16/2019 11:39 AM    HDL Cholesterol 57 07/16/2019 11:39 AM    LDL, calculated 52 07/16/2019 11:39 AM    VLDL, calculated 17 07/16/2019 11:39 AM    Triglyceride 84 07/16/2019 11:39 AM    CHOL/HDL Ratio 1.8 05/14/2015 10:53 AM         Current Outpatient Medications   Medication Sig    losartan (COZAAR) 100 mg tablet Take 1 Tab by mouth daily.  hydroCHLOROthiazide (HYDRODIURIL) 25 mg tablet Take 1 Tab by mouth daily.  furosemide (LASIX) 20 mg tablet TAKE 1 TAB BY MOUTH DAILY AS NEEDED. INDICATIONS: SWELLING    amLODIPine (NORVASC) 10 mg tablet TAKE 1 TABLET BY MOUTH DAILY  Indications: high blood pressure    allopurinol (ZYLOPRIM) 300 mg tablet Take 1 Tab by mouth daily. Indications: treatment to prevent acute gout attack    atorvastatin (LIPITOR) 20 mg tablet TAKE 1 TABLET BY MOUTH DAILY    fexofenadine (ALLEGRA) 180 mg tablet TAKE 1 TABLET BY MOUTH DAILY    morinda citrifolia fruit (ELIO) 250 mg cap Take 1 Cap by mouth daily.  MULTIVIT-MIN/FA/CALCIUM/VIT K1 (ONE-A-DAY WOMEN'S 50+ PO) Take 1 Tab by mouth daily.  Magnesium Oxide 500 mg cap Take 400 mg by mouth daily.  celecoxib (CELEBREX) 200 mg capsule Take  by mouth two (2) times a day. No current facility-administered medications for this visit. No Known Allergies    Active Ambulatory Problems     Diagnosis Date Noted    Mild chronic anemia 05/20/2015    Impaired glucose tolerance 01/04/2016    Essential hypertension 01/04/2016    Obesity (BMI 30-39.9) 01/04/2016    Hyperlipidemia 02/01/2016    Primary osteoarthritis of right knee 02/08/2016    Severe obesity (BMI 35.0-39. 9) with comorbidity ([facility] [address] 03/22/2018    Failed total knee replacement ([facility] [address] 05/02/2018    Gout involving toe 11/19/2018     Resolved Ambulatory Problems     Diagnosis Date Noted    No Resolved Ambulatory Problems     Past Medical History:   Diagnosis Date    Arthritis     Elevated cholesterol     Hypertension     Joint pain      Health Maintenance Due   Topic Date Due    Shingrix Vaccine Age 49> (1 of 2) 07/23/2006    BREAST CANCER SCRN MAMMOGRAM 04/19/2020       Review of Systems   Constitutional: Negative for chills and fever. HENT: Negative for hearing loss. Eyes: Negative for blurred vision, discharge and redness. Respiratory: Negative for cough and shortness of breath. Cardiovascular: Negative for chest pain, palpitations, orthopnea and leg swelling. Gastrointestinal: Negative for abdominal pain, blood in stool, constipation, diarrhea and melena. Genitourinary: Negative for dysuria, frequency and urgency. Musculoskeletal: Negative for joint pain and myalgias. Right thigh and low back burning   Skin: Negative for rash. Neurological: Negative for dizziness, focal weakness and headaches. Psychiatric/Behavioral: Negative for depression. The patient is not nervous/anxious. Results for orders placed or performed in visit on 07/16/19   LIPID PANEL   Result Value Ref Range    Triglyceride 84 40 - 149 mg/dL    HDL Cholesterol 57 40 - 59 mg/dL    Cholesterol, total 126 110 - 200 mg/dL    CHOLESTEROL/HDL 2.2 0.0 - 5.0    LDL, calculated 52 50 - 99 mg/dL    VLDL, calculated 17 8 - 30 mg/dL   METABOLIC PANEL, BASIC   Result Value Ref Range    Glucose 115 (H) 70 - 99 mg/dL    BUN 11 6 - 22 mg/dL    Creatinine 0.6 (L) 0.8 - 1.4 mg/dL    Sodium 142 133 - 145 mmol/L    Potassium 3.8 3.5 - 5.5 mmol/L    Chloride 101 98 - 110 mmol/L    CO2 30 20 - 32 mmol/L    Calcium 10.2 8.4 - 10.5 mg/dL    Anion gap 11.0 mmol/L    GFRAA >60.0 >60.0    GFRNA >60.0 >60.0   CBC WITH AUTOMATED DIFF   Result Value Ref Range    WBC 7.4 4.0 - 11.0 K/uL    RBC 4.12 3.80 - 5.20 M/uL    HGB 11.2 (L) 11.7 - 16.0 g/dL    HCT 34.7 (L) 35.1 - 48.0 %    MCV 84 80 - 95 fL    MCH 27 26 - 34 pg    MCHC 32 31 - 36 g/dL    RDW 14.1 10.0 - 15.5 %    PLATELET 859 599 - 654 K/uL    MPV 9.6 9.0 - 13.0 fL    NEUTROPHILS 67 40 - 75 %    Lymphocytes 25 20 - 45 %    MONOCYTES 6 3 - 12 %    EOSINOPHILS 2 0 - 6 %    BASOPHILS 0 0 - 2 %    ABS.  NEUTROPHILS 4.9 1.8 - 7.7 K/uL ABSOLUTE LYMPHOCYTE COUNT 1.9 1.0 - 4.8 K/uL    ABS. MONOCYTES 0.5 0.1 - 1.0 K/uL    ABS. EOSINOPHILS 0.1 0.0 - 0.5 K/uL    ABS. BASOPHILS 0.0 0.0 - 0.2 K/uL       Visit Vitals  /82 (BP 1 Location: Right arm, BP Patient Position: Sitting)   Pulse 100   Temp 96.7 °F (35.9 °C) (Oral)   Resp 18   Ht 5' 9.5\" (1.765 m)   Wt 279 lb (126.6 kg)   SpO2 95%   BMI 40.61 kg/m²       Physical Exam  Constitutional:       Appearance: She is well-developed. Comments: Body mass index is 39.88 kg/m². HENT:      Head: Normocephalic and atraumatic. Right Ear: External ear normal.      Left Ear: External ear normal.   Eyes:      General: No scleral icterus. Conjunctiva/sclera: Conjunctivae normal.      Pupils: Pupils are equal, round, and reactive to light. Neck:      Thyroid: No thyromegaly. Vascular: No JVD. Cardiovascular:      Rate and Rhythm: Normal rate and regular rhythm. Heart sounds: Normal heart sounds. Pulmonary:      Effort: Pulmonary effort is normal.      Breath sounds: Normal breath sounds. Abdominal:      General: Bowel sounds are normal. There is no distension. Tenderness: There is no abdominal tenderness. Musculoskeletal: Normal range of motion. General: Edema present. No deformity. Lymphadenopathy:      Cervical: No cervical adenopathy. Skin:     General: Skin is dry. Capillary Refill: Capillary refill takes less than 2 seconds. Coloration: Skin is not pale. Findings: No erythema or rash. Neurological:      Mental Status: She is alert and oriented to person, place, and time. Cranial Nerves: No cranial nerve deficit. Sensory: No sensory deficit. Motor: No abnormal muscle tone. Coordination: Coordination normal.   Psychiatric:         Behavior: Behavior normal.         Thought Content:  Thought content normal.         Judgment: Judgment normal.     MDM  Number of Diagnoses or Management Options  Dyspnea, unspecified type: Essential hypertension:   Gout involving toe, unspecified cause, unspecified chronicity, unspecified laterality:   Hyperlipidemia, unspecified hyperlipidemia type:   Leg swelling:   Diagnosis management comments: 2D echocardiogram reordered. Peripheral edema etiologies in the differential diagnosis include congestive heart failure proteinuria peripheral venous insufficiency. Renal insufficiency appears to have been ruled out. Consider liver function tests although there are no signs of this or hepatic disease and consider proteinuria and again no signs of this. Continue work-up  Patient had a flu shot      ASSESSMENT and PLAN    ICD-10-CM ICD-9-CM    1. Leg swelling M79.89 729.81 ECHO ADULT COMPLETE    Question cardiac disease which CHF, or peripheral disease. Other possibilities. Plan echocardiogram rescheduled. 2. Essential hypertension I10 401.9     This is in adequate control. Continue present medications. 3. Dyspnea, unspecified type R06.00 786.09 ECHO ADULT COMPLETE   4. Gout involving toe, unspecified cause, unspecified chronicity, unspecified laterality M10.9 274.9     This is doing well continue present medications   5. Hyperlipidemia, unspecified hyperlipidemia type E78.5 272.4     Doing well, LDL 52. Continue present regimen     Follow-up and Dispositions    · Return in about 4 months (around 5/15/2020) for Follow up on [facility]. lab results and schedule of future lab studies reviewed with patient    This note was done with the assistance of dragon speech software.   Some inadvertent errors or omissions may be present

## 2020-01-15 NOTE — PATIENT INSTRUCTIONS
High Blood Pressure: Care Instructions Overview It's normal for blood pressure to go up and down throughout the day. But if it stays up, you have high blood pressure. Another name for high blood pressure is hypertension. Despite what a lot of people think, high blood pressure usually doesn't cause headaches or make you feel dizzy or lightheaded. It usually has no symptoms. But it does increase your risk of stroke, heart attack, and other problems. You and your doctor will talk about your risks of these problems based on your blood pressure. Your doctor will give you a goal for your blood pressure. Your goal will be based on your health and your age. Lifestyle changes, such as eating healthy and being active, are always important to help lower blood pressure. You might also take medicine to reach your blood pressure goal. 
Follow-up care is a key part of your treatment and safety. Be sure to make and go to all appointments, and call your doctor if you are having problems. It's also a good idea to know your test results and keep a list of the medicines you take. How can you care for yourself at home? Medical treatment · If you stop taking your medicine, your blood pressure will go back up. You may take one or more types of medicine to lower your blood pressure. Be safe with medicines. Take your medicine exactly as prescribed. Call your doctor if you think you are having a problem with your medicine. · Talk to your doctor before you start taking aspirin every day. Aspirin can help certain people lower their risk of a heart attack or stroke. But taking aspirin isn't right for everyone, because it can cause serious bleeding. · See your doctor regularly. You may need to see the doctor more often at first or until your blood pressure comes down. · If you are taking blood pressure medicine, talk to your doctor before you take decongestants or anti-inflammatory medicine, such as ibuprofen. Some of these medicines can raise blood pressure. · Learn how to check your blood pressure at home. Lifestyle changes · Stay at a healthy weight. This is especially important if you put on weight around the waist. Losing even 10 pounds can help you lower your blood pressure. · If your doctor recommends it, get more exercise. Walking is a good choice. Bit by bit, increase the amount you walk every day. Try for at least 30 minutes on most days of the week. You also may want to swim, bike, or do other activities. · Avoid or limit alcohol. Talk to your doctor about whether you can drink any alcohol. · Try to limit how much sodium you eat to less than 2,300 milligrams (mg) a day. Your doctor may ask you to try to eat less than 1,500 mg a day. · Eat plenty of fruits (such as bananas and oranges), vegetables, legumes, whole grains, and low-fat dairy products. · Lower the amount of saturated fat in your diet. Saturated fat is found in animal products such as milk, cheese, and meat. Limiting these foods may help you lose weight and also lower your risk for heart disease. · Do not smoke. Smoking increases your risk for heart attack and stroke. If you need help quitting, talk to your doctor about stop-smoking programs and medicines. These can increase your chances of quitting for good. When should you call for help? Call 911 anytime you think you may need emergency care. This may mean having symptoms that suggest that your blood pressure is causing a serious heart or blood vessel problem. Your blood pressure may be over 180/120. For example, call 911 if: 
  · You have symptoms of a heart attack. These may include: 
? Chest pain or pressure, or a strange feeling in the chest. 
? Sweating. ? Shortness of breath. ? Nausea or vomiting. ? Pain, pressure, or a strange feeling in the back, neck, jaw, or upper belly or in one or both shoulders or arms. ? Lightheadedness or sudden weakness. ? A fast or irregular heartbeat. · You have symptoms of a stroke. These may include: 
? Sudden numbness, tingling, weakness, or loss of movement in your face, arm, or leg, especially on only one side of your body. ? Sudden vision changes. ? Sudden trouble speaking. ? Sudden confusion or trouble understanding simple statements. ? Sudden problems with walking or balance. ? A sudden, severe headache that is different from past headaches. · You have severe back or belly pain. Do not wait until your blood pressure comes down on its own. Get help right away. Call your doctor now or seek immediate care if: 
  · Your blood pressure is much higher than normal (such as 180/120 or higher), but you don't have symptoms. · You think high blood pressure is causing symptoms, such as: 
? Severe headache. 
? Blurry vision. Watch closely for changes in your health, and be sure to contact your doctor if: 
  · Your blood pressure measures higher than your doctor recommends at least 2 times. That means the top number is higher or the bottom number is higher, or both. · You think you may be having side effects from your blood pressure medicine. Where can you learn more? Go to http://nahid-maureen.info/. Enter E739 in the search box to learn more about \"High Blood Pressure: Care Instructions. \" Current as of: July 22, 2018 Content Version: 12.1 © 5189-4448 [employer]. Care instructions adapted under license by [employer] (which disclaims liability or warranty for this information). If you have questions about a medical condition or this instruction, always ask your healthcare professional. [place] [address] any warranty or liability for your use of this information. High Cholesterol: Care Instructions Your Care Instructions Cholesterol is a type of fat in your blood.  It is needed for many body functions, such as making new cells. Cholesterol is made by your body. It also comes from food you eat. High cholesterol means that you have too much of the fat in your blood. This raises your risk of a heart attack and stroke. LDL and HDL are part of your total cholesterol. LDL is the \"bad\" cholesterol. High LDL can raise your risk for heart disease, heart attack, and stroke. HDL is the \"good\" cholesterol. It helps clear bad cholesterol from the body. High HDL is linked with a lower risk of heart disease, heart attack, and stroke. Your cholesterol levels help your doctor find out your risk for having a heart attack or stroke. You and your doctor can talk about whether you need to lower your risk and what treatment is best for you. A heart-healthy lifestyle along with medicines can help lower your cholesterol and your risk. The way you choose to lower your risk will depend on how high your risk is for heart attack and stroke. It will also depend on how you feel about taking medicines. Follow-up care is a key part of your treatment and safety. Be sure to make and go to all appointments, and call your doctor if you are having problems. It's also a good idea to know your test results and keep a list of the medicines you take. How can you care for yourself at home? · Eat a variety of foods every day. Good choices include fruits, vegetables, whole grains (like oatmeal), dried beans and peas, nuts and seeds, soy products (like tofu), and fat-free or low-fat dairy products. · Replace butter, margarine, and hydrogenated or partially hydrogenated oils with olive and canola oils. (Canola oil margarine without trans fat is fine.) · Replace red meat with fish, poultry, and soy protein (like tofu). · Limit processed and packaged foods like chips, crackers, and cookies. · Bake, broil, or steam foods. Don't arevalo them. · Be physically active.  Get at least 30 minutes of exercise on most days of the week. Walking is a good choice. You also may want to do other activities, such as running, swimming, cycling, or playing tennis or team sports. · Stay at a healthy weight or lose weight by making the changes in eating and physical activity listed above. Losing just a small amount of weight, even 5 to 10 pounds, can reduce your risk for having a heart attack or stroke. · Do not smoke. When should you call for help? Watch closely for changes in your health, and be sure to contact your doctor if: 
  · You need help making lifestyle changes. · You have questions about your medicine. Where can you learn more? Go to http://nahidEveryone Countsmaureen.info/. Enter Y344 in the search box to learn more about \"High Cholesterol: Care Instructions. \" Current as of: July 22, 2018 Content Version: 12.1 © 3773-5764 [employer]. Care instructions adapted under license by [employer] (which disclaims liability or warranty for this information). If you have questions about a medical condition or this instruction, always ask your healthcare professional. [address] any warranty or liability for your use of this information. High Cholesterol: Care Instructions Your Care Instructions Cholesterol is a type of fat in your blood. It is needed for many body functions, such as making new cells. Cholesterol is made by your body. It also comes from food you eat. High cholesterol means that you have too much of the fat in your blood. This raises your risk of a heart attack and stroke. LDL and HDL are part of your total cholesterol. LDL is the \"bad\" cholesterol. High LDL can raise your risk for heart disease, heart attack, and stroke. HDL is the \"good\" cholesterol. It helps clear bad cholesterol from the body. High HDL is linked with a lower risk of heart disease, heart attack, and stroke. Your cholesterol levels help your doctor find out your risk for having a heart attack or stroke. You and your doctor can talk about whether you need to lower your risk and what treatment is best for you. A heart-healthy lifestyle along with medicines can help lower your cholesterol and your risk. The way you choose to lower your risk will depend on how high your risk is for heart attack and stroke. It will also depend on how you feel about taking medicines. Follow-up care is a key part of your treatment and safety. Be sure to make and go to all appointments, and call your doctor if you are having problems. It's also a good idea to know your test results and keep a list of the medicines you take. How can you care for yourself at home? · Eat a variety of foods every day. Good choices include fruits, vegetables, whole grains (like oatmeal), dried beans and peas, nuts and seeds, soy products (like tofu), and fat-free or low-fat dairy products. · Replace butter, margarine, and hydrogenated or partially hydrogenated oils with olive and canola oils. (Canola oil margarine without trans fat is fine.) · Replace red meat with fish, poultry, and soy protein (like tofu). · Limit processed and packaged foods like chips, crackers, and cookies. · Bake, broil, or steam foods. Don't arevalo them. · Be physically active. Get at least 30 minutes of exercise on most days of the week. Walking is a good choice. You also may want to do other activities, such as running, swimming, cycling, or playing tennis or team sports. · Stay at a healthy weight or lose weight by making the changes in eating and physical activity listed above. Losing just a small amount of weight, even 5 to 10 pounds, can reduce your risk for having a heart attack or stroke. · Do not smoke. When should you call for help? Watch closely for changes in your health, and be sure to contact your doctor if: 
  · You need help making lifestyle changes. · You have questions about your medicine. Where can you learn more? Go to http://nahid-maureen.info/. Enter S850 in the search box to learn more about \"High Cholesterol: Care Instructions. \" Current as of: July 22, 2018 Content Version: 12.1 © 2586-9994 [employer]. Care instructions adapted under license by [employer] (which disclaims liability or warranty for this information). If you have questions about a medical condition or this instruction, always ask your healthcare professional. [address] any warranty or liability for your use of this information. DASH Diet: Care Instructions Your Care Instructions The DASH diet is an eating plan that can help lower your blood pressure. DASH stands for Dietary Approaches to Stop Hypertension. Hypertension is high blood pressure. The DASH diet focuses on eating foods that are high in calcium, potassium, and magnesium. These nutrients can lower blood pressure. The foods that are highest in these nutrients are fruits, vegetables, low-fat dairy products, nuts, seeds, and legumes. But taking calcium, potassium, and magnesium supplements instead of eating foods that are high in those nutrients does not have the same effect. The DASH diet also includes whole grains, fish, and poultry. The DASH diet is one of several lifestyle changes your doctor may recommend to lower your high blood pressure. Your doctor may also want you to decrease the amount of sodium in your diet. Lowering sodium while following the DASH diet can lower blood pressure even further than just the DASH diet alone. Follow-up care is a key part of your treatment and safety. Be sure to make and go to all appointments, and call your doctor if you are having problems. It's also a good idea to know your test results and keep a list of the medicines you take. How can you care for yourself at home? Following the DASH diet · Eat 4 to 5 servings of fruit each day. A serving is 1 medium-sized piece of fruit, ½ cup chopped or canned fruit, 1/4 cup dried fruit, or 4 ounces (½ cup) of fruit juice. Choose fruit more often than fruit juice. · Eat 4 to 5 servings of vegetables each day. A serving is 1 cup of lettuce or raw leafy vegetables, ½ cup of chopped or cooked vegetables, or 4 ounces (½ cup) of vegetable juice. Choose vegetables more often than vegetable juice. · Get 2 to 3 servings of low-fat and fat-free dairy each day. A serving is 8 ounces of milk, 1 cup of yogurt, or 1 ½ ounces of cheese. · Eat 6 to 8 servings of grains each day. A serving is 1 slice of bread, 1 ounce of dry cereal, or ½ cup of cooked rice, pasta, or cooked cereal. Try to choose whole-grain products as much as possible. · Limit lean meat, poultry, and fish to 2 servings each day. A serving is 3 ounces, about the size of a deck of cards. · Eat 4 to 5 servings of nuts, seeds, and legumes (cooked dried beans, lentils, and split peas) each week. A serving is 1/3 cup of nuts, 2 tablespoons of seeds, or ½ cup of cooked beans or peas. · Limit fats and oils to 2 to 3 servings each day. A serving is 1 teaspoon of vegetable oil or 2 tablespoons of salad dressing. · Limit sweets and added sugars to 5 servings or less a week. A serving is 1 tablespoon jelly or jam, ½ cup sorbet, or 1 cup of lemonade. · Eat less than 2,300 milligrams (mg) of sodium a day. If you limit your sodium to 1,500 mg a day, you can lower your blood pressure even more. Tips for success · Start small. Do not try to make dramatic changes to your diet all at once. You might feel that you are missing out on your favorite foods and then be more likely to not follow the plan. Make small changes, and stick with them. Once those changes become habit, add a few more changes. · Try some of the following: ? Make it a goal to eat a fruit or vegetable at every meal and at snacks. This will make it easy to get the recommended amount of fruits and vegetables each day. ? Try yogurt topped with fruit and nuts for a snack or healthy dessert. ? Add lettuce, tomato, cucumber, and onion to sandwiches. ? Combine a ready-made pizza crust with low-fat mozzarella cheese and lots of vegetable toppings. Try using tomatoes, squash, spinach, broccoli, carrots, cauliflower, and onions. ? Have a variety of cut-up vegetables with a low-fat dip as an appetizer instead of chips and dip. ? Sprinkle sunflower seeds or chopped almonds over salads. Or try adding chopped walnuts or almonds to cooked vegetables. ? Try some vegetarian meals using beans and peas. Add garbanzo or kidney beans to salads. Make burritos and tacos with mashed brown beans or black beans. Where can you learn more? Go to http://nahid-maureen.info/. Enter X043 in the search box to learn more about \"DASH Diet: Care Instructions. \" Current as of: April 9, 2019 Content Version: 12.2 © 2593-8454 [employer]. Care instructions adapted under license by [employer] (which disclaims liability or warranty for this information). If you have questions about a medical condition or this instruction, always ask your healthcare professional. [place] [address] any warranty or liability for your use of this information.

## 2020-01-23 ENCOUNTER — HOSPITAL ENCOUNTER (OUTPATIENT)
Dept: NON INVASIVE DIAGNOSTICS | Age: 65
Discharge: HOME | End: 2020-01-23
Attending: EMERGENCY MEDICINE
Payer: COMMERCIAL

## 2020-01-23 VITALS
BODY MASS INDEX: 41.32 KG/M2 | HEIGHT: 69 IN | SYSTOLIC BLOOD PRESSURE: 143 MMHG | DIASTOLIC BLOOD PRESSURE: 82 MMHG | WEIGHT: 279 LBS

## 2020-01-23 DIAGNOSIS — R06.00 DYSPNEA, UNSPECIFIED TYPE: ICD-10-CM

## 2020-01-23 DIAGNOSIS — M79.89 LEG SWELLING: ICD-10-CM

## 2020-01-23 LAB
ECHO AO ROOT DIAM: 2.89 CM
ECHO LV E' LATERAL VELOCITY: 9.9 CM/S
ECHO LV E' SEPTAL VELOCITY: 11.67 CM/S
ECHO LV EDV TEICHHOLZ: 0.56 ML
ECHO LV ESV TEICHHOLZ: 0.24 ML
ECHO LV INTERNAL DIMENSION DIASTOLIC: 4.63 CM (ref 3.9–5.3)
ECHO LV INTERNAL DIMENSION SYSTOLIC: 3.22 CM
ECHO LV IVSD: 1.22 CM (ref 0.6–0.9)
ECHO LV MASS 2D: 254.2 G (ref 67–162)
ECHO LV MASS INDEX 2D: 106.8 G/M2 (ref 43–95)
ECHO LV POSTERIOR WALL DIASTOLIC: 1.24 CM (ref 0.6–0.9)
ECHO LVOT DIAM: 2.14 CM
ECHO LVOT PEAK GRADIENT: 4.2 MMHG
ECHO LVOT PEAK VELOCITY: 103.05 CM/S
ECHO LVOT VTI: 24.01 CM
ECHO MV A VELOCITY: 80.53 CM/S
ECHO MV E DECELERATION TIME (DT): 145.2 MS
ECHO MV E VELOCITY: 95.6 CM/S
ECHO MV E/A RATIO: 1.19
ECHO MV E/E' LATERAL: 9.66
ECHO MV E/E' RATIO (AVERAGED): 8.92
ECHO MV E/E' SEPTAL: 8.19
ECHO PULMONARY ARTERY SYSTOLIC PRESSURE (PASP): 18 MMHG
ECHO PVEIN A DURATION: 108.5 MS
ECHO PVEIN A VELOCITY: 28.18 CM/S
ECHO TV REGURGITANT MAX VELOCITY: 200.19 CM/S
ECHO TV REGURGITANT PEAK GRADIENT: 16 MMHG
LVFS 2D: 30.58 %
LVOT MG: 2.83 MMHG
LVOT MV: 0.82 CM/S
LVSV (TEICH): 23.18 ML
MV DEC SLOPE: 6.58

## 2020-01-23 PROCEDURE — 93306 TTE W/DOPPLER COMPLETE: CPT

## 2020-01-28 ENCOUNTER — TELEPHONE (OUTPATIENT)
Dept: FAMILY MEDICINE CLINIC | Facility: CLINIC | Age: 65
End: 2020-01-28

## 2020-01-28 NOTE — TELEPHONE ENCOUNTER
----- Message from Maximilian Ahumada MD sent at 1/28/2020  6:22 AM EST -----  Please call, the echocardiogram showed good heart function. We will follow-up on her notes her next visit.

## 2020-01-28 NOTE — PROGRESS NOTES
Please call, the echocardiogram showed good heart function. We will follow-up on her notes her next visit.

## 2020-03-03 DIAGNOSIS — E78.5 HYPERLIPIDEMIA, UNSPECIFIED HYPERLIPIDEMIA TYPE: ICD-10-CM

## 2020-03-03 NOTE — TELEPHONE ENCOUNTER
Last seen 10/16/19  Next appt   05/13/20    Requested Prescriptions     Pending Prescriptions Disp Refills    atorvastatin (LIPITOR) 20 mg tablet 90 Tab 3     Sig: TAKE 1 TABLET BY MOUTH DAILY       Patient is out of medication

## 2020-03-06 RX ORDER — ATORVASTATIN CALCIUM 20 MG/1
TABLET, FILM COATED ORAL
Qty: 90 TAB | Refills: 3 | Status: SHIPPED | OUTPATIENT
Start: 2020-03-06 | End: 2021-02-07

## 2020-05-14 NOTE — PROGRESS NOTES
Consent: Kannan Iraheta, who was seen by synchronous (real-time) audio-video technology, and/or her healthcare decision maker, is aware that this patient-initiated, Telehealth encounter on 5/15/2020 is a billable service, with coverage as determined by her insurance carrier. She is aware that she may receive a bill and has provided verbal consent to proceed: Yes. The patient was at home and I was at the office,and no others participated in the service. The primary encounter diagnosis was Essential hypertension. Diagnoses of Hyperlipidemia, unspecified hyperlipidemia type, Gout involving toe, unspecified cause, unspecified chronicity, unspecified laterality, Primary osteoarthritis of right knee, Severe obesity (BMI 35.0-39. 9) with comorbidity ([address] and Mild chronic anemia were also pertinent to this visit. ASSESSMENT and PLAN    ICD-10-CM ICD-9-CM    1. Essential hypertension I10 401.9    2. Hyperlipidemia, unspecified hyperlipidemia type E78.5 272.4    3. Gout involving toe, unspecified cause, unspecified chronicity, unspecified laterality M10.9 274.9    4. Primary osteoarthritis of right knee M17.11 715.16    5. Severe obesity (BMI 35.0-39. 9) with comorbidity ([address] E66.01 278.01    6. Mild chronic anemia D64.9 285.9          lab results and schedule of future lab studies reviewed with patient  radiology results and schedule of future radiology studies reviewed with patient  Health Maintenance Due   Topic Date Due    Shingrix Vaccine Age 49> (1 of 2) 07/23/2006    Breast Cancer Screen Mammogram  04/19/2020         I spent at least 15 minutes with this established patient, and >50% of the time was spent counseling and/or coordinating care regarding Osteoarthritis, obesity, hypertension, hyperlipidemia. 712  Subjective:   Kannan Iraheta is a 62 y.o. female who was seen for follow-up.   The patient had leg swelling it was noted that on the last echo cardiogram  it showed normal cavity size and systolic function. It also showed normal diastolic function there was mild concentric hypertrophy. The estimated EF was 55 to 60% no regional wall motion abnormality. There was mild TR there was mitral annular calcification with trace MR her most recent labs in July 2019 were reassuring. A mammogram from July 19 is still pending. She has essential hypertension which was in adequate control on the last visit  The patient had gout which was treated symptomatically. There is a history of hyperlipidemia with an LDL that was within normal adequate therapeutic range. Hypertension  The patient has had no problem with the medication. The patient has no headaches, visual changes, chest pain or pressure,dyspnea, orthopnea, abdominal pain, dysuria, weakness, or paresthesias. BP Readings from Last 3 Encounters:   01/23/20 143/82   01/15/20 143/82   10/16/19 (!) 155/91     Lab Results   Component Value Date/Time    Sodium 142 07/16/2019 11:39 AM    Potassium 3.8 07/16/2019 11:39 AM    Chloride 101 07/16/2019 11:39 AM    CO2 30 07/16/2019 11:39 AM    Anion gap 11.0 07/16/2019 11:39 AM    Glucose 115 (H) 07/16/2019 11:39 AM    BUN 11 07/16/2019 11:39 AM    Creatinine 0.6 (L) 07/16/2019 11:39 AM    BUN/Creatinine ratio 13 11/11/2017 11:10 AM    GFR est  11/11/2017 11:10 AM    GFR est non-AA 98 11/11/2017 11:10 AM    Calcium 10.2 07/16/2019 11:39 AM       01/23/20   ECHO ADULT COMPLETE 01/23/2020 1/23/2020    Narrative · Normal cavity size, systolic function (ejection fraction normal) and   diastolic function. Mild concentric hypertrophy. Estimated left   ventricular ejection fraction is 55 - 60%. Visually measured ejection   fraction. No regional wall motion abnormality noted. · Mild tricuspid valve regurgitation is present. · Mitral annular calcification. Trace mitral valve regurgitation is   present.         Signed by: Cari Westbrook MD     Ramirez CAD CHF Meds             losartan (COZAAR) 100 mg tablet TAKE 1 TABLET BY MOUTH EVERY DAY    hydroCHLOROthiazide (HYDRODIURIL) 25 mg tablet TAKE 1 TABLET BY MOUTH EVERY DAY    atorvastatin (LIPITOR) 20 mg tablet TAKE 1 TABLET BY MOUTH DAILY    furosemide (LASIX) 20 mg tablet TAKE 1 TAB BY MOUTH DAILY AS NEEDED. INDICATIONS: SWELLING    amLODIPine (NORVASC) 10 mg tablet TAKE 1 TABLET BY MOUTH DAILY  Indications: high blood pressure                  Hyperlipidemia  The patient has had no problem with the medications  The patient denies muscle aches, headache, GI symptoms or difficulty with mentation. Key Antihyperlipidemia Meds             atorvastatin (LIPITOR) 20 mg tablet TAKE 1 TABLET BY MOUTH DAILY        Lab Results   Component Value Date/Time    Cholesterol, total 126 07/16/2019 11:39 AM    HDL Cholesterol 57 07/16/2019 11:39 AM    LDL, calculated 52 07/16/2019 11:39 AM    VLDL, calculated 17 07/16/2019 11:39 AM    Triglyceride 84 07/16/2019 11:39 AM    CHOL/HDL Ratio 1.8 05/14/2015 10:53 AM       Prior to Admission medications    Medication Sig Start Date End Date Taking? Authorizing Provider   losartan (COZAAR) 100 mg tablet TAKE 1 TABLET BY MOUTH EVERY DAY 5/10/20   Francisco Lopez MD   hydroCHLOROthiazide (HYDRODIURIL) 25 mg tablet TAKE 1 TABLET BY MOUTH EVERY DAY 3/29/20   Francisco Lopez MD   atorvastatin (LIPITOR) 20 mg tablet TAKE 1 TABLET BY MOUTH DAILY 3/6/20   Francisco Lopez MD   celecoxib (CELEBREX) 200 mg capsule Take  by mouth two (2) times a day. Provider, Yoon   furosemide (LASIX) 20 mg tablet TAKE 1 TAB BY MOUTH DAILY AS NEEDED. INDICATIONS: SWELLING 7/16/19   Francisco Lopez MD   amLODIPine (NORVASC) 10 mg tablet TAKE 1 TABLET BY MOUTH DAILY  Indications: high blood pressure 7/16/19   Dayami Parekh MD   allopurinol (ZYLOPRIM) 300 mg tablet Take 1 Tab by mouth daily.  Indications: treatment to prevent acute gout attack 7/3/19   Francisco Lopez MD   fexofenadine (ALLEGRA) 180 mg tablet TAKE 1 TABLET BY MOUTH DAILY 5/21/18   Nereida Sepulveda MD morinda citrifolia fruit (ELIO) 250 mg cap Take 1 Cap by mouth daily. Provider, Historical   MULTIVIT-MIN/FA/CALCIUM/VIT K1 (ONE-A-DAY WOMEN'S 50+ PO) Take 1 Tab by mouth daily. Provider, Historical   Magnesium Oxide 500 mg cap Take 400 mg by mouth daily. Provider, Historical     No Known Allergies  has Mild chronic anemia, Impaired glucose tolerance, Essential hypertension, Obesity (BMI 30-39.9), Hyperlipidemia, Primary osteoarthritis of right knee, Severe obesity (BMI 35.0-39. 9) with comorbidity ([address] Failed total knee replacement ([facility] and Gout involving toe on their problem list.  Past Surgical History:   Procedure Laterality Date    COLONOSCOPY N/A 6/19/2019    COLONOSCOPY with polyp bx performed by Rene Sheriff MD at [facility] HX HYSTERECTOMY      HX KNEE REPLACEMENT Left     HX ORTHOPAEDIC Right Feb, 2016    TKR    HX TONSILLECTOMY         . Review of Systems   Constitutional: Negative for chills, fever and malaise/fatigue. HENT: Negative for congestion and sore throat. Eyes: Negative for blurred vision and redness. Respiratory: Negative for cough, shortness of breath and wheezing. Cardiovascular: Negative for chest pain and leg swelling. Gastrointestinal: Negative for abdominal pain, blood in stool, constipation, diarrhea and heartburn. Genitourinary: Negative for dysuria and urgency. Musculoskeletal: Negative for joint pain and myalgias. Neurological: Negative for dizziness, sensory change, speech change and focal weakness. Endo/Heme/Allergies: Does not bruise/bleed easily. Psychiatric/Behavioral: The patient is not nervous/anxious. Objective: There were no vitals taken for this visit.    General: alert, cooperative, no distress   Mental  status: normal mood, behavior, speech, dress, motor activity, and thought processes, able to follow commands   HENT: NCAT   Neck: no visualized mass   Musculoskeletal:    Resp: no respiratory distress Neuro: no gross deficits   Skin: no discoloration or lesions of concern on visible areas   Psychiatric: normal affect, consistent with stated mood, no evidence of hallucinations     Additional exam findings:   Results for orders placed or performed during the hospital encounter of 01/23/20   ECHO ADULT COMPLETE   Result Value Ref Range    LV E' Lateral Velocity 9.90 cm/s    LV E' Septal Velocity 11.67 cm/s    Ao Root D 2.89 cm    LVIDd 4.63 3.9 - 5.3 cm    LVPWd 1.24 (A) 0.6 - 0.9 cm    LVIDs 3.22 cm    IVSd 1.22 (A) 0.6 - 0.9 cm    LVOT d 2.14 cm    LVOT Peak Velocity 103.05 cm/s    LVOT Peak Gradient 4.2 mmHg    LVOT VTI 24.01 cm    MV A Lobo 80.53 cm/s    MV E Lobo 95.60 cm/s    MV E/A 1.19     LV Mass .2 (A) 67 - 162 g    LV Mass AL Index 106.8 43 - 95 g/m2    E/E' lateral 9.66     E/E' septal 8.19     E/E' ratio (averaged) 8.92     Mitral Valve E Wave Deceleration Time 145.2 ms    Triscuspid Valve Regurgitation Peak Gradient 16.0 mmHg    Pulmonary Vein \"A\" Wave Velocity 28.18 cm/s    P Vein A Dur 108.5 ms    TR Max Velocity 200.19 cm/s    PASP 18.0 mmHg    Left Ventricular Fractional Shortening by 2D 50.965647074 %    Left Ventricular Outflow Tract Mean Gradient 2.044538926431 mmHg    Left Ventricular Outflow Tract Mean Velocity 6.30787852582302 cm/s    Mitral Valve Deceleration [place] 4.4475731729165     Left Ventricular End Diastolic Volume by Teichholz Method 0.09249201433 mL    Left Ventricular End Systolic Volume by Teichholz Method 7.64110838558 mL    Left Ventricular Stroke Volume by Teichholz Method 31.088549765 mL         We discussed the expected course, resolution and complications of the diagnosis(es) in detail. Medication risks, benefits, costs, interactions, and alternatives were discussed as indicated. I advised her to contact the office if her condition worsens, changes or fails to improve as anticipated. She expressed understanding with the diagnosis(es) and plan.        Shonda Trimble is a 62 y.o. female being evaluated by a video visit encounter for concerns as above. A caregiver was present when appropriate. Due to this being a TeleHealth encounter (During IEBTS-95 public health emergency), evaluation of the following organ systems was limited: Vitals/Constitutional/EENT/Resp/CV/GI//MS/Neuro/Skin/Heme-Lymph-Imm. Pursuant to the emergency declaration under the [facility], [facility] waiver authority and the [employer] and [employer] Act, this Virtual  Visit was conducted, with patient's (and/or legal guardian's) consent, to reduce the patient's risk of exposure to COVID-19 and provide necessary medical care. Waleska Olmedo MD      This note was done with the assistance of dragon speech software.   Some inadvertent errors or omissions may be present

## 2020-05-15 ENCOUNTER — VIRTUAL VISIT (OUTPATIENT)
Dept: FAMILY MEDICINE CLINIC | Facility: CLINIC | Age: 65
End: 2020-05-15

## 2020-05-15 DIAGNOSIS — M17.11 PRIMARY OSTEOARTHRITIS OF RIGHT KNEE: ICD-10-CM

## 2020-05-15 DIAGNOSIS — E66.01 SEVERE OBESITY (BMI 35.0-39.9) WITH COMORBIDITY (HCC): ICD-10-CM

## 2020-05-15 DIAGNOSIS — E78.5 HYPERLIPIDEMIA, UNSPECIFIED HYPERLIPIDEMIA TYPE: ICD-10-CM

## 2020-05-15 DIAGNOSIS — I10 ESSENTIAL HYPERTENSION: Primary | ICD-10-CM

## 2020-05-15 DIAGNOSIS — M10.9 GOUT INVOLVING TOE, UNSPECIFIED CAUSE, UNSPECIFIED CHRONICITY, UNSPECIFIED LATERALITY: ICD-10-CM

## 2020-05-15 DIAGNOSIS — D64.9 MILD CHRONIC ANEMIA: ICD-10-CM

## 2020-05-17 NOTE — PROGRESS NOTES
Consent: Aravind Gambino, who was seen by synchronous (real-time) audio-video technology, and/or her healthcare decision maker, is aware that this patient-initiated, Telehealth encounter on 5/18/2020 is a billable service, with coverage as determined by her insurance carrier. She is aware that she may receive a bill and has provided verbal consent to proceed: Yes. The patient was at home and I was at the office,and no others participated in the service. The primary encounter diagnosis was Essential hypertension. Diagnoses of Hyperlipidemia, unspecified hyperlipidemia type, Dyspnea, unspecified type, Gout involving toe, unspecified cause, unspecified chronicity, unspecified laterality, Leg swelling, Primary osteoarthritis of right knee, and Severe obesity (BMI 35.0-39. 9) with comorbidity ([address] were also pertinent to this visit. MDM  Number of Diagnoses or Management Options  Dyspnea, unspecified type:   Essential hypertension:   Gout involving toe, unspecified cause, unspecified chronicity, unspecified laterality:   Hyperlipidemia, unspecified hyperlipidemia type:   Leg swelling:   Primary osteoarthritis of right knee:   Severe obesity (BMI 35.0-39. 9) with comorbidity [facility]):         ASSESSMENT and PLAN    ICD-10-CM ICD-9-CM    1. Essential hypertension I10 401.9     This is a chronic problem. It is presently well controlled. We will continue the same treatment. We will get good readings once the office opens up again   2. Hyperlipidemia, unspecified hyperlipidemia type E78.5 272.4     This is a chronic problem. It is presently well controlled. We will continue the same treatment. His last level checked July 2019. Recheck at the one-year m   3. Dyspnea, unspecified type R06.00 786.09     This is improved, follow. 4. Gout involving toe, unspecified cause, unspecified chronicity, unspecified laterality M10.9 274.9    5. Leg swelling M79.89 729.81     Still present. Echo reassuring.   Labs are reassuring. Suspect venous insufficiency. May benefit from support hose. 6. Severe obesity (BMI 35.0-39. 9) with comorbidity ([facility] E66.01 278.01     Admits to slight weight gain. Should improve once able to ambulate and the viral epidemic has improved. Follow-up and Dispositions    · Return in about 3 months (around 8/18/2020). lab results and schedule of future lab studies reviewed with patient  Health Maintenance Due   Topic Date Due    Shingrix Vaccine Age 49> (1 of 2) 07/23/2006    Breast Cancer Screen Mammogram  04/19/2020         I spent at least 15 minutes with this established patient, and >50% of the time was spent counseling and/or coordinating care regarding Hypertension, peripheral edema, hyperlipidemia, obesity, history of degenerative joint disease. 712  Subjective:   Mauricio Ojeda is a 62 y.o. female who was seen for follow-up.    notrouble    Blood pressure  Feels well    Beterr supine    Lipids good    Weight        The patient had leg swelling. On the last echocardiogram the patient was noted to have normal cavity size and systolic function. It also showed normal diastolic function there was mild concentric hypertrophy. The estimated EF was 55 to 60% no regional wall motion abnormality. There was mild TR there was mitral annular calcification with trace MR her most recent labs in July 2019 were reassuring. A mammogram from July 19 is still pending. She has essential hypertension which was in adequate control on the last visit  The patient had gout which was treated symptomatically. There is a history of hyperlipidemia with an LDL that was within normal adequate therapeutic range. Prior to Admission medications    Medication Sig Start Date End Date Taking?  Authorizing Provider   losartan (COZAAR) 100 mg tablet TAKE 1 TABLET BY MOUTH EVERY DAY 5/10/20   Love Ricci MD   hydroCHLOROthiazide (HYDRODIURIL) 25 mg tablet TAKE 1 TABLET BY MOUTH EVERY DAY 3/29/20   Iglesia Marylou Del Valle MD   atorvastatin (LIPITOR) 20 mg tablet TAKE 1 TABLET BY MOUTH DAILY 3/6/20   Malika Leos MD   celecoxib (CELEBREX) 200 mg capsule Take  by mouth two (2) times a day. Provider, Historical   furosemide (LASIX) 20 mg tablet TAKE 1 TAB BY MOUTH DAILY AS NEEDED. INDICATIONS: SWELLING 7/16/19   Malika Leos MD   amLODIPine (NORVASC) 10 mg tablet TAKE 1 TABLET BY MOUTH DAILY  Indications: high blood pressure 7/16/19   Marylou Parekh MD   allopurinol (ZYLOPRIM) 300 mg tablet Take 1 Tab by mouth daily. Indications: treatment to prevent acute gout attack 7/3/19   Malika Leos MD   fexofenadine (ALLEGRA) 180 mg tablet TAKE 1 TABLET BY MOUTH DAILY 5/21/18   Irena Yuen MD   morinda citrifolia fruit (ELIO) 250 mg cap Take 1 Cap by mouth daily. Provider, Historical   MULTIVIT-MIN/FA/CALCIUM/VIT K1 (ONE-A-DAY WOMEN'S 50+ PO) Take 1 Tab by mouth daily. Provider, Historical   Magnesium Oxide 500 mg cap Take 400 mg by mouth daily. Provider, Historical     No Known Allergies  has Mild chronic anemia, Impaired glucose tolerance, Essential hypertension, Obesity (BMI 30-39.9), Hyperlipidemia, Primary osteoarthritis of right knee, Severe obesity (BMI 35.0-39. 9) with comorbidity ([address] Failed total knee replacement ([address] and Gout involving toe on their problem list.  Past Surgical History:   Procedure Laterality Date    COLONOSCOPY N/A 6/19/2019    COLONOSCOPY with polyp bx performed by Jayson Emerson MD at [facility] HX HYSTERECTOMY      HX KNEE REPLACEMENT Left     HX ORTHOPAEDIC Right Feb, 2016    TKR    HX TONSILLECTOMY         . Family History   Problem Relation Age of Onset    Diabetes Mother     Colon Polyps Mother     Dementia Mother     Heart Disease Father     Hypertension Brother        Review of Systems   Constitutional: Negative for chills, fever and malaise/fatigue. HENT: Negative for congestion and sore throat.     Eyes: Negative for blurred vision and redness. Respiratory: Negative for cough, shortness of breath and wheezing. Cardiovascular: Positive for leg swelling. Negative for chest pain. Gastrointestinal: Negative for abdominal pain, blood in stool, constipation, diarrhea and heartburn. Genitourinary: Negative for dysuria and urgency. Musculoskeletal: Negative for joint pain and myalgias. Neurological: Negative for dizziness, sensory change, speech change and focal weakness. Endo/Heme/Allergies: Does not bruise/bleed easily. Psychiatric/Behavioral: The patient is not nervous/anxious. Objective: There were no vitals taken for this visit. General: alert, cooperative, no distress   Mental  status: normal mood, behavior, speech, dress, motor activity, and thought processes, able to follow commands   HENT: NCAT. Pupils equal.  Full range of ocular motion noted. Speech is strong   Neck: no visualized mass   Musculoskeletal:  Able to ambulate well with minimal the pain to movement. 1+ edema of lower extremities is appreciated.    Resp: no respiratory distress   Neuro: no gross deficits   Skin: no discoloration or lesions of concern on visible areas   Psychiatric: normal affect, consistent with stated mood, no evidence of hallucinations     Additional exam findings:   Results for orders placed or performed during the hospital encounter of 01/23/20   ECHO ADULT COMPLETE   Result Value Ref Range    LV E' Lateral Velocity 9.90 cm/s    LV E' Septal Velocity 11.67 cm/s    Ao Root D 2.89 cm    LVIDd 4.63 3.9 - 5.3 cm    LVPWd 1.24 (A) 0.6 - 0.9 cm    LVIDs 3.22 cm    IVSd 1.22 (A) 0.6 - 0.9 cm    LVOT d 2.14 cm    LVOT Peak Velocity 103.05 cm/s    LVOT Peak Gradient 4.2 mmHg    LVOT VTI 24.01 cm    MV A Lobo 80.53 cm/s    MV E Lobo 95.60 cm/s    MV E/A 1.19     LV Mass .2 (A) 67 - 162 g    LV Mass AL Index 106.8 43 - 95 g/m2    E/E' lateral 9.66     E/E' septal 8.19     E/E' ratio (averaged) 8.92     Mitral Valve E Wave Deceleration Time 145.2 ms    Triscuspid Valve Regurgitation Peak Gradient 16.0 mmHg    Pulmonary Vein \"A\" Wave Velocity 28.18 cm/s    P Vein A Dur 108.5 ms    TR Max Velocity 200.19 cm/s    PASP 18.0 mmHg    Left Ventricular Fractional Shortening by 2D 09.408998561 %    Left Ventricular Outflow Tract Mean Gradient 2.162178268528 mmHg    Left Ventricular Outflow Tract Mean Velocity 1.20114715625955 cm/s    Mitral Valve Deceleration [place] 2.7090960493964     Left Ventricular End Diastolic Volume by Teichholz Method 3.48453439865 mL    Left Ventricular End Systolic Volume by Teichholz Method 8.91677377345 mL    Left Ventricular Stroke Volume by Teichholz Method 29.350436781 mL         We discussed the expected course, resolution and complications of the diagnosis(es) in detail. Medication risks, benefits, costs, interactions, and alternatives were discussed as indicated. I advised her to contact the office if her condition worsens, changes or fails to improve as anticipated. She expressed understanding with the diagnosis(es) and plan. Aretha Barger is a 62 y.o. female being evaluated by a video visit encounter for concerns as above. A caregiver was present when appropriate. Due to this being a TeleHealth encounter (During OAQCP-41 public health emergency), evaluation of the following organ systems was limited: Vitals/Constitutional/EENT/Resp/CV/GI//MS/Neuro/Skin/Heme-Lymph-Imm. Pursuant to the emergency declaration under the [address] [place] [address], [facility] waiver authority and the [employer] Act, this Virtual  Visit was conducted, with patient's (and/or legal guardian's) consent, to reduce the patient's risk of exposure to COVID-19 and provide necessary medical care. Pablo Welch MD      This note was done with the assistance of dragon speech software.   Some inadvertent errors or omissions may be present

## 2020-05-18 ENCOUNTER — VIRTUAL VISIT (OUTPATIENT)
Dept: FAMILY MEDICINE CLINIC | Facility: CLINIC | Age: 65
End: 2020-05-18

## 2020-05-18 DIAGNOSIS — I10 ESSENTIAL HYPERTENSION: Primary | ICD-10-CM

## 2020-05-18 DIAGNOSIS — R06.00 DYSPNEA, UNSPECIFIED TYPE: ICD-10-CM

## 2020-05-18 DIAGNOSIS — E78.5 HYPERLIPIDEMIA, UNSPECIFIED HYPERLIPIDEMIA TYPE: ICD-10-CM

## 2020-05-18 DIAGNOSIS — E66.01 SEVERE OBESITY (BMI 35.0-39.9) WITH COMORBIDITY (HCC): ICD-10-CM

## 2020-05-18 DIAGNOSIS — M10.9 GOUT INVOLVING TOE, UNSPECIFIED CAUSE, UNSPECIFIED CHRONICITY, UNSPECIFIED LATERALITY: ICD-10-CM

## 2020-05-18 DIAGNOSIS — M79.89 LEG SWELLING: ICD-10-CM

## 2020-07-23 DIAGNOSIS — M79.89 LEG SWELLING: ICD-10-CM

## 2020-07-23 RX ORDER — FUROSEMIDE 20 MG/1
TABLET ORAL
Qty: 90 TAB | Refills: 6 | Status: SHIPPED | OUTPATIENT
Start: 2020-07-23 | End: 2021-10-12

## 2020-10-09 ENCOUNTER — TRANSCRIBE ORDER (OUTPATIENT)
Dept: SCHEDULING | Age: 65
End: 2020-10-09

## 2020-10-09 DIAGNOSIS — Z12.31 VISIT FOR SCREENING MAMMOGRAM: Primary | ICD-10-CM

## 2020-12-11 ENCOUNTER — HOSPITAL ENCOUNTER (OUTPATIENT)
Dept: MAMMOGRAPHY | Age: 65
Discharge: HOME | End: 2020-12-11
Attending: EMERGENCY MEDICINE
Payer: COMMERCIAL

## 2020-12-11 DIAGNOSIS — Z12.31 VISIT FOR SCREENING MAMMOGRAM: ICD-10-CM

## 2020-12-11 PROCEDURE — 77063 BREAST TOMOSYNTHESIS BI: CPT

## 2020-12-23 DIAGNOSIS — R92.8 ABNORMAL MAMMOGRAM OF LEFT BREAST: Primary | ICD-10-CM

## 2021-01-12 ENCOUNTER — HOSPITAL ENCOUNTER (OUTPATIENT)
Dept: MAMMOGRAPHY | Age: 66
Discharge: HOME | End: 2021-01-12
Attending: EMERGENCY MEDICINE
Payer: COMMERCIAL

## 2021-01-12 DIAGNOSIS — R92.8 ABNORMAL MAMMOGRAM OF LEFT BREAST: ICD-10-CM

## 2021-01-12 PROCEDURE — 77065 DX MAMMO INCL CAD UNI: CPT

## 2021-01-12 NOTE — PROGRESS NOTES
conducted an initial consultation and Spiritual Assessment for Matt Childress, who is a 60 y.o.,female. Patient's Primary Language is: [place]. According to the patient's EMR Caodaism Affiliation is: [facility].     The reason the Patient came to the hospital is:   Patient Active Problem List    Diagnosis Date Noted    Gout involving toe 11/19/2018    Failed total knee replacement ([facility] 05/02/2018    Severe obesity (BMI 35.0-39. 9) with comorbidity ([facility] 03/22/2018    Primary osteoarthritis of right knee 02/08/2016    Hyperlipidemia 02/01/2016    Impaired glucose tolerance 01/04/2016    Essential hypertension 01/04/2016    Obesity (BMI 30-39.9) 01/04/2016    Mild chronic anemia 05/20/2015        The  provided the following Interventions:  Initiated a relationship of care and support. The following outcomes where achieved:  Patient expressed gratitude for 's visit. Plan:  Chaplains will provide pastoral care on an as needed/requested basis.       [facility]   [phone]

## 2021-01-17 NOTE — PROGRESS NOTES
Please call the area is read as probably benign with a six month follow up recommended. Will reorder at that time.  If she has any concerning symptoms in the interim don't hesitate to notify us.

## 2021-01-20 NOTE — PROGRESS NOTES
TC was made to pt and pt phone message states \" that the customer you are trying to reach is not available please try your call again later. \"

## 2021-03-15 NOTE — ANESTHESIA POSTPROCEDURE EVALUATION
Impression: Combined forms of age-related cataract, bilateral: H25.813. Plan: The patient has a visually significant cataract in the left eye. After discussion with the patient and careful examination it has been determined that a cataract in the left eye is accounting for a significant amount of patient's visual symptoms. Cataract surgery and the associated risks, benefits, alternatives, expectations, and recovery were discussed in detail with the patient. All questions were answered. The patient understands that there may be some limitation in visual potential given any pre-existing ocular disease. RL 2, good dilation, no alpha blocker use, no prev. ocular sx. Surgeon: Dr. Baron Harvey CE IOL OS standard lens vs toric lens Left eye first then right eye second. Post-Anesthesia Evaluation and Assessment    Patient: Yeimi Berumen MRN: [id number]  SSN: [id number]    YOB: 1955  Age: 65 y.o. Sex: female       Cardiovascular Function/Vital Signs  Visit Vitals    /72    Pulse 82    Temp 36.5 °C (97.7 °F)    Resp 15    Ht 5' 9\" (1.753 m)    Wt 115.2 kg (254 lb)    SpO2 99%    BMI 37.51 kg/m2       Patient is status post general anesthesia for Procedure(s):  RIGHT TOTAL KNEE REVISION. Nausea/Vomiting: None    Postoperative hydration reviewed and adequate. Pain:  Pain Scale 1: Numeric (0 - 10) (05/02/18 0920)  Pain Intensity 1: 0 (05/02/18 0920)   Managed    Neurological Status:   Neuro (WDL): Within Defined Limits (05/02/18 0930)   At baseline    Mental Status and Level of Consciousness: Arousable    Pulmonary Status:   O2 Device: Room air (05/02/18 0920)   Adequate oxygenation and airway patent    Complications related to anesthesia: None    Post-anesthesia assessment completed.  No concerns    Signed By: Melba Warner MD     May 2, 2018

## 2021-03-20 DIAGNOSIS — I10 ESSENTIAL HYPERTENSION: ICD-10-CM

## 2021-03-22 RX ORDER — HYDROCHLOROTHIAZIDE 25 MG/1
TABLET ORAL
Qty: 90 TAB | Refills: 3 | Status: SHIPPED | OUTPATIENT
Start: 2021-03-22 | End: 2022-01-22

## 2021-03-23 ENCOUNTER — TELEPHONE (OUTPATIENT)
Dept: FAMILY MEDICINE CLINIC | Age: 66
End: 2021-03-23

## 2021-05-20 NOTE — PROGRESS NOTES
Consent: Alex Moody, who was seen by synchronous (real-time) audio-video technology, and/or her healthcare decision maker, is aware that this patient-initiated, Telehealth encounter on 5/26/2021 is a billable service, with coverage as determined by her insurance carrier. She is aware that she may receive a bill and has provided verbal consent to proceed: Yes. The patient was at home and I was at the offices of the [address] [place] [address] no one else participated in the service. 05/26/21      ICD-10-CM ICD-9-CM    1. Hyperlipidemia, unspecified hyperlipidemia type  E78.5 272.4 LIPID PANEL   2. Impaired glucose tolerance  R73.02 790.22 CBC WITH AUTOMATED DIFF      METABOLIC PANEL, COMPREHENSIVE      HEMOGLOBIN A1C WITH EAG      MICROALBUMIN, UR, RAND W/ MICROALB/CREAT RATIO   3. Essential hypertension  I10 401.9    4. Mild chronic anemia  D64.9 285.9    5. Primary osteoarthritis of right knee  M17.11 715.16 celecoxib (CELEBREX) 200 mg capsule   6. Anxiety  F41.9 300.00 escitalopram oxalate (LEXAPRO) 10 mg tablet   7. Screening for osteoporosis  Z13.820 V82.81          The patient is doing well except for anxiety from recent deaths. Will trial Lexapro. Counseling if not improving. The blood pressure is slightly elevated and we will follow it until we see her in person. No changes at this time. The patient has an elevated blood sugar and elevated lipids. Follow-up blood sugar, hemoglobin A1c and lipid panel have been ordered. On Lasix as well as HCTZ. Consider stopping the Lasix and switching to a different medication. Mild anemia noted on last visit, follow-up CBC ordered  lab results and schedule of future lab studies reviewed with patient  Diagnostic and radiologic results and the schedule of future studies were reviewed with the patient  reviewed diet, exercise and weight control  All questions were answered and understood.               Health Maintenance Due   Topic Date Due    COVID-19 Vaccine (1) Never done    Shingrix Vaccine Age 50> (1 of 2) Never done    Lipid Screen  07/16/2020    Bone Densitometry (Dexa) Screening  07/23/2021   COVId shots     Subjective:   Edvin Evans is a 60 y.o. female . She is being seen in follow-up. The patient was previously seen in May 2020 for essential hypertension that was in good control, hyperlipidemia that was well controlled, dyspnea of unspecified type that had been improving. Etiology to be determined. In 2017 the patient had a hemoglobin A1c of 6.2, in the diabetic range. The patient had gout which was improving. She also had leg swelling. The echocardiogram was reassuring and we suspected venous insufficiency but we will following it. It was thought to be with his initiate a trial of support hose. The patient had an unhealthy weight and she thought that this would improve post increase ambulation. We will follow up on this visit. Lost niece and brother in the house Mothers day then 3 days later  Hypertension  The patient has had no problem with the medication. The patient has no headaches, visual changes, chest pain or pressure,dyspnea, orthopnea, or PND  High a few times  Diabetes mellitus  The patient denies polyuria, polydipsia, or polyphagia. There has been no problem with the medications. Hyperlipidemia  The patient has had no problem with the medications. The patient denies any myalgias or weakness. No abdominal discomfort admitted to. BP Readings from Last 3 Encounters:   01/23/20 143/82   01/15/20 143/82   10/16/19 (!) 155/91         ECHO ADULT COMPLETE 01/23/2020 1/23/2020    Interpretation Summary  · Normal cavity size, systolic function (ejection fraction normal) and diastolic function. Mild concentric hypertrophy. Estimated left ventricular ejection fraction is 55 - 60%. Visually measured ejection fraction. No regional wall motion abnormality noted. · Mild tricuspid valve regurgitation is present.   · Mitral annular calcification. Trace mitral valve regurgitation is present. Signed by: Art Acosta MD on 1/23/2020 12:03 PM    Lab Results   Component Value Date/Time    Hemoglobin A1c 6.2 (H) 11/11/2017 11:10 AM    Hemoglobin A1c (POC) 6.0 11/13/2017 09:22 AM     Lab Results   Component Value Date/Time    Cholesterol, total 126 07/16/2019 11:39 AM    HDL Cholesterol 57 07/16/2019 11:39 AM    LDL, calculated 52 07/16/2019 11:39 AM    VLDL, calculated 17 07/16/2019 11:39 AM    Triglyceride 84 07/16/2019 11:39 AM    CHOL/HDL Ratio 1.8 05/14/2015 10:53 AM             Current Outpatient Medications   Medication Sig    losartan (COZAAR) 100 mg tablet TAKE 1 TABLET BY MOUTH EVERY DAY    hydroCHLOROthiazide (HYDRODIURIL) 25 mg tablet TAKE 1 TABLET BY MOUTH EVERY DAY    atorvastatin (LIPITOR) 20 mg tablet TAKE 1 TABLET BY MOUTH EVERY DAY    furosemide (LASIX) 20 mg tablet TAKE 1 TAB BY MOUTH DAILY AS NEEDED. INDICATIONS: SWELLING    amLODIPine (NORVASC) 10 mg tablet TAKE 1 TABLET BY MOUTH DAILY INDICATIONS: HIGH BLOOD PRESSURE    allopurinoL (ZYLOPRIM) 300 mg tablet TAKE 1 TAB BY MOUTH DAILY. INDICATIONS: TREATMENT TO PREVENT ACUTE GOUT ATTACK    celecoxib (CELEBREX) 200 mg capsule Take  by mouth two (2) times a day.  fexofenadine (ALLEGRA) 180 mg tablet TAKE 1 TABLET BY MOUTH DAILY    morinda citrifolia fruit (ELIO) 250 mg cap Take 1 Cap by mouth daily.  MULTIVIT-MIN/FA/CALCIUM/VIT K1 (ONE-A-DAY WOMEN'S 50+ PO) Take 1 Tab by mouth daily.  Magnesium Oxide 500 mg cap Take 400 mg by mouth daily. No current facility-administered medications for this visit. No Known Allergies  has Mild chronic anemia, Impaired glucose tolerance, Essential hypertension, Obesity (BMI 30-39.9), Hyperlipidemia, Primary osteoarthritis of right knee, Severe obesity (BMI 35.0-39. 9) with comorbidity ([address] Failed total knee replacement ([facility] [address] and Gout involving toe on their problem list.    Past Surgical History:   Procedure Laterality Date    COLONOSCOPY N/A 6/19/2019    COLONOSCOPY with polyp bx performed by Stella Sutton MD at [facility] HX HYSTERECTOMY      HX KNEE REPLACEMENT Left     HX OOPHORECTOMY      HX ORTHOPAEDIC Right Feb, 2016    TKR    HX TONSILLECTOMY        reports that she has never smoked. She has never used smokeless tobacco. She reports that she does not drink alcohol and does not use drugs. family history includes Colon Polyps in her mother; Dementia in her mother; Diabetes in her mother; Heart Disease in her father; Hypertension in her brother. Review of Systems   Constitutional: Negative for chills, fever and malaise/fatigue. HENT: Negative for congestion and sore throat. Eyes: Negative for blurred vision and redness. Respiratory: Negative for cough, shortness of breath and wheezing. Cardiovascular: Positive for leg swelling. Negative for chest pain. Gastrointestinal: Negative for abdominal pain, blood in stool, constipation, diarrhea and heartburn. Genitourinary: Negative for dysuria and urgency. Musculoskeletal: Negative for joint pain and myalgias. Neurological: Negative for dizziness, sensory change, speech change and focal weakness. Endo/Heme/Allergies: Does not bruise/bleed easily. Psychiatric/Behavioral: The patient is not nervous/anxious. Physical Exam  Constitutional:       Appearance: She is well-developed. HENT:      Head: Normocephalic and atraumatic. Right Ear: External ear normal.      Left Ear: External ear normal.   Eyes:      General: No scleral icterus. Conjunctiva/sclera: Conjunctivae normal.      Pupils: Pupils are equal, round, and reactive to light. Neck:      Thyroid: No thyromegaly. Vascular: No JVD. Pulmonary:      Effort: Pulmonary effort is normal.   Abdominal:      General: There is no distension. Tenderness: There is abdominal tenderness. Musculoskeletal:         General: No deformity. Normal range of motion. Lymphadenopathy:      Cervical: No cervical adenopathy. Skin:     General: Skin is dry. Capillary Refill: Capillary refill takes less than 2 seconds. Coloration: Skin is not pale. Findings: No erythema or rash. Neurological:      Mental Status: She is alert and oriented to person, place, and time. Cranial Nerves: No cranial nerve deficit. Sensory: No sensory deficit. Motor: No abnormal muscle tone. Coordination: Coordination normal.   Psychiatric:         Behavior: Behavior normal.         Thought Content:  Thought content normal.         Judgment: Judgment normal.            Lab Results   Component Value Date/Time    WBC 7.4 07/16/2019 11:39 AM    HGB 11.2 (L) 07/16/2019 11:39 AM    HCT 34.7 (L) 07/16/2019 11:39 AM    PLATELET 554 02/60/5448 11:39 AM    MCV 84 07/16/2019 11:39 AM     Lab Results   Component Value Date/Time    Hemoglobin A1c 6.2 (H) 11/11/2017 11:10 AM    Hemoglobin A1c 6.2 (H) 01/30/2017 08:30 AM    Hemoglobin A1c 6.0 05/14/2015 10:53 AM    Glucose 115 (H) 07/16/2019 11:39 AM    LDL, calculated 52 07/16/2019 11:39 AM    Creatinine 0.6 (L) 07/16/2019 11:39 AM      Lab Results   Component Value Date/Time    Cholesterol, total 126 07/16/2019 11:39 AM    HDL Cholesterol 57 07/16/2019 11:39 AM    LDL, calculated 52 07/16/2019 11:39 AM    Triglyceride 84 07/16/2019 11:39 AM    CHOL/HDL Ratio 1.8 05/14/2015 10:53 AM     Lab Results   Component Value Date/Time    Sodium 142 07/16/2019 11:39 AM    Potassium 3.8 07/16/2019 11:39 AM    Chloride 101 07/16/2019 11:39 AM    CO2 30 07/16/2019 11:39 AM    Anion gap 11.0 07/16/2019 11:39 AM    Glucose 115 (H) 07/16/2019 11:39 AM    BUN 11 07/16/2019 11:39 AM    Creatinine 0.6 (L) 07/16/2019 11:39 AM    BUN/Creatinine ratio 13 11/11/2017 11:10 AM    GFR est  11/11/2017 11:10 AM    GFR est non-AA 98 11/11/2017 11:10 AM    Calcium 10.2 07/16/2019 11:39 AM             We discussed the expected course, resolution and complications of the diagnosis(es) in detail. Medication risks, benefits, costs, interactions, and alternatives were discussed as indicated. I advised her to contact the office if her condition worsens, changes or fails to improve as anticipated. She expressed understanding with the diagnosis(es) and plan. Ingrid Cruz is a 60 y.o. female being evaluated by a video visit encounter for concerns as above. A caregiver was present when appropriate. Due to this being a TeleHealth encounter (During [facility] public health emergency), evaluation of the following organ systems was limited: Vitals/Constitutional/EENT/Resp/CV/GI//MS/Neuro/Skin/Heme-Lymph-Imm. Pursuant to the emergency declaration under the [address] [place] [address], [facility] waiver authority and the [employer] Act, this Virtual  Visit was conducted, with patient's (and/or legal guardian's) consent, to reduce the patient's risk of exposure to COVID-19 and provide necessary medical care. This note was done with the assistance of dragon speech software. Some inadvertent errors or omissions may be present  We discussed the expected course, resolution and complications of the diagnosis(es) in detail. Medication risks, benefits, costs, interactions, and alternatives were discussed as indicated. I advised her to contact the office if her condition worsens, changes or fails to improve as anticipated. She expressed understanding with the diagnosis(es) and plan. This note was done with the assistance of dragon speech software.   Some inadvertent errors or omissions may be present

## 2021-05-26 ENCOUNTER — VIRTUAL VISIT (OUTPATIENT)
Dept: FAMILY MEDICINE CLINIC | Age: 66
End: 2021-05-26
Payer: COMMERCIAL

## 2021-05-26 DIAGNOSIS — E78.5 HYPERLIPIDEMIA, UNSPECIFIED HYPERLIPIDEMIA TYPE: Primary | ICD-10-CM

## 2021-05-26 DIAGNOSIS — D64.9 MILD CHRONIC ANEMIA: ICD-10-CM

## 2021-05-26 DIAGNOSIS — M17.11 PRIMARY OSTEOARTHRITIS OF RIGHT KNEE: ICD-10-CM

## 2021-05-26 DIAGNOSIS — I10 ESSENTIAL HYPERTENSION: ICD-10-CM

## 2021-05-26 DIAGNOSIS — R73.02 IMPAIRED GLUCOSE TOLERANCE: ICD-10-CM

## 2021-05-26 DIAGNOSIS — Z13.820 SCREENING FOR OSTEOPOROSIS: ICD-10-CM

## 2021-05-26 DIAGNOSIS — F41.9 ANXIETY: ICD-10-CM

## 2021-05-26 PROCEDURE — 99214 OFFICE O/P EST MOD 30 MIN: CPT | Performed by: EMERGENCY MEDICINE

## 2021-05-26 RX ORDER — ESCITALOPRAM OXALATE 10 MG/1
10 TABLET ORAL DAILY
Qty: 30 TABLET | Refills: 3 | Status: SHIPPED | OUTPATIENT
Start: 2021-05-26 | End: 2021-06-23 | Stop reason: SDUPTHER

## 2021-05-26 RX ORDER — CELECOXIB 200 MG/1
200 CAPSULE ORAL 2 TIMES DAILY
Qty: 60 CAPSULE | Refills: 3 | Status: SHIPPED | OUTPATIENT
Start: 2021-05-26 | End: 2021-09-20

## 2021-06-23 DIAGNOSIS — F41.9 ANXIETY: ICD-10-CM

## 2021-06-23 RX ORDER — ESCITALOPRAM OXALATE 10 MG/1
10 TABLET ORAL DAILY
Qty: 90 TABLET | Refills: 3 | Status: SHIPPED | OUTPATIENT
Start: 2021-06-23 | End: 2022-06-19

## 2021-06-23 NOTE — TELEPHONE ENCOUNTER
Requested Prescriptions     Pending Prescriptions Disp Refills    escitalopram oxalate (LEXAPRO) 10 mg tablet 30 Tablet 3     Sig: Take 1 Tablet by mouth daily.

## 2021-07-10 DIAGNOSIS — I10 ESSENTIAL HYPERTENSION: ICD-10-CM

## 2021-07-10 RX ORDER — AMLODIPINE BESYLATE 10 MG/1
TABLET ORAL
Qty: 90 TABLET | Refills: 3 | Status: SHIPPED | OUTPATIENT
Start: 2021-07-10 | End: 2022-03-13

## 2021-07-13 DIAGNOSIS — R92.8 ABNORMAL MAMMOGRAM: Primary | ICD-10-CM

## 2021-07-20 ENCOUNTER — HOSPITAL ENCOUNTER (OUTPATIENT)
Dept: MAMMOGRAPHY | Age: 66
Discharge: HOME | End: 2021-07-20
Attending: EMERGENCY MEDICINE
Payer: COMMERCIAL

## 2021-07-20 DIAGNOSIS — R92.8 ABNORMAL MAMMOGRAM: ICD-10-CM

## 2021-07-20 DIAGNOSIS — R92.1 BREAST CALCIFICATIONS: ICD-10-CM

## 2021-07-20 PROCEDURE — 77065 DX MAMMO INCL CAD UNI: CPT

## 2021-07-24 DIAGNOSIS — R92.8 ABNORMAL MAMMOGRAM: Primary | ICD-10-CM

## 2021-07-24 NOTE — PROGRESS NOTES
Please call. The mammogram was thought to be probably benign but recommended 6-month follow-up. This is been placed.

## 2021-09-18 DIAGNOSIS — M17.11 PRIMARY OSTEOARTHRITIS OF RIGHT KNEE: ICD-10-CM

## 2021-09-20 RX ORDER — CELECOXIB 200 MG/1
200 CAPSULE ORAL 2 TIMES DAILY
Qty: 60 CAPSULE | Refills: 3 | Status: SHIPPED | OUTPATIENT
Start: 2021-09-20 | End: 2022-01-22

## 2021-10-11 DIAGNOSIS — M79.89 LEG SWELLING: ICD-10-CM

## 2021-10-12 RX ORDER — FUROSEMIDE 20 MG/1
TABLET ORAL
Qty: 90 TABLET | Refills: 6 | Status: SHIPPED | OUTPATIENT
Start: 2021-10-12

## 2022-01-22 DIAGNOSIS — M17.11 PRIMARY OSTEOARTHRITIS OF RIGHT KNEE: ICD-10-CM

## 2022-01-22 DIAGNOSIS — E78.5 HYPERLIPIDEMIA, UNSPECIFIED HYPERLIPIDEMIA TYPE: ICD-10-CM

## 2022-01-22 DIAGNOSIS — I10 ESSENTIAL HYPERTENSION: ICD-10-CM

## 2022-01-22 RX ORDER — ATORVASTATIN CALCIUM 20 MG/1
TABLET, FILM COATED ORAL
Qty: 90 TABLET | Refills: 3 | Status: SHIPPED | OUTPATIENT
Start: 2022-01-22

## 2022-01-22 RX ORDER — CELECOXIB 200 MG/1
200 CAPSULE ORAL 2 TIMES DAILY
Qty: 60 CAPSULE | Refills: 3 | Status: SHIPPED | OUTPATIENT
Start: 2022-01-22 | End: 2022-10-12

## 2022-01-22 RX ORDER — HYDROCHLOROTHIAZIDE 25 MG/1
TABLET ORAL
Qty: 90 TABLET | Refills: 3 | Status: SHIPPED | OUTPATIENT
Start: 2022-01-22

## 2022-03-13 DIAGNOSIS — I10 ESSENTIAL HYPERTENSION: ICD-10-CM

## 2022-03-13 RX ORDER — AMLODIPINE BESYLATE 10 MG/1
TABLET ORAL
Qty: 90 TABLET | Refills: 3 | Status: SHIPPED | OUTPATIENT
Start: 2022-03-13

## 2022-03-19 PROBLEM — M10.9 GOUT INVOLVING TOE: Status: ACTIVE | Noted: 2018-11-19

## 2022-03-19 PROBLEM — E66.01 SEVERE OBESITY (BMI 35.0-39.9) WITH COMORBIDITY (HCC): Status: ACTIVE | Noted: 2018-03-22

## 2022-03-19 PROBLEM — T84.018A FAILED TOTAL KNEE REPLACEMENT (HCC): Status: ACTIVE | Noted: 2018-05-02

## 2022-03-19 PROBLEM — Z96.659 FAILED TOTAL KNEE REPLACEMENT (HCC): Status: ACTIVE | Noted: 2018-05-02

## 2022-05-03 DIAGNOSIS — I10 ESSENTIAL HYPERTENSION: ICD-10-CM

## 2022-05-04 RX ORDER — LOSARTAN POTASSIUM 100 MG/1
TABLET ORAL
Qty: 90 TABLET | Refills: 4 | Status: SHIPPED | OUTPATIENT
Start: 2022-05-04

## 2022-06-18 DIAGNOSIS — F41.9 ANXIETY: ICD-10-CM

## 2022-06-19 RX ORDER — ESCITALOPRAM OXALATE 10 MG/1
TABLET ORAL
Qty: 90 TABLET | Refills: 3 | Status: SHIPPED | OUTPATIENT
Start: 2022-06-19

## 2022-08-21 DIAGNOSIS — M1A.09X0 CHRONIC GOUT OF MULTIPLE SITES, UNSPECIFIED CAUSE: ICD-10-CM

## 2022-08-22 RX ORDER — ALLOPURINOL 300 MG/1
300 TABLET ORAL DAILY
Qty: 90 TABLET | Refills: 4 | Status: SHIPPED | OUTPATIENT
Start: 2022-08-22

## 2022-10-12 DIAGNOSIS — M17.11 PRIMARY OSTEOARTHRITIS OF RIGHT KNEE: ICD-10-CM

## 2022-10-12 RX ORDER — CELECOXIB 200 MG/1
CAPSULE ORAL
Qty: 60 CAPSULE | Refills: 3 | Status: SHIPPED | OUTPATIENT
Start: 2022-10-12

## 2022-12-12 DIAGNOSIS — M79.89 LEG SWELLING: ICD-10-CM

## 2022-12-15 RX ORDER — FUROSEMIDE 20 MG/1
TABLET ORAL
Qty: 90 TABLET | Refills: 6 | Status: SHIPPED | OUTPATIENT
Start: 2022-12-15

## 2023-02-12 DIAGNOSIS — I10 ESSENTIAL (PRIMARY) HYPERTENSION: ICD-10-CM

## 2023-02-12 RX ORDER — AMLODIPINE BESYLATE 10 MG/1
TABLET ORAL
Qty: 90 TABLET | Refills: 3 | Status: SHIPPED | OUTPATIENT
Start: 2023-02-12

## 2023-02-13 DIAGNOSIS — E78.5 HYPERLIPIDEMIA, UNSPECIFIED: ICD-10-CM

## 2023-02-13 DIAGNOSIS — I10 ESSENTIAL (PRIMARY) HYPERTENSION: ICD-10-CM

## 2023-02-13 RX ORDER — ATORVASTATIN CALCIUM 20 MG/1
TABLET, FILM COATED ORAL
Qty: 90 TABLET | Refills: 3 | Status: SHIPPED | OUTPATIENT
Start: 2023-02-13

## 2023-02-13 RX ORDER — HYDROCHLOROTHIAZIDE 25 MG/1
TABLET ORAL
Qty: 90 TABLET | Refills: 3 | Status: SHIPPED | OUTPATIENT
Start: 2023-02-13

## 2023-04-30 DIAGNOSIS — M17.11 UNILATERAL PRIMARY OSTEOARTHRITIS, RIGHT KNEE: ICD-10-CM

## 2023-05-03 RX ORDER — CELECOXIB 200 MG/1
CAPSULE ORAL
Qty: 60 CAPSULE | Refills: 3 | Status: SHIPPED | OUTPATIENT
Start: 2023-05-03

## 2023-06-29 DIAGNOSIS — M17.11 UNILATERAL PRIMARY OSTEOARTHRITIS, RIGHT KNEE: ICD-10-CM

## 2023-06-30 RX ORDER — CELECOXIB 200 MG/1
CAPSULE ORAL
Qty: 60 CAPSULE | Refills: 0 | OUTPATIENT
Start: 2023-06-30

## 2023-07-12 DIAGNOSIS — M17.11 UNILATERAL PRIMARY OSTEOARTHRITIS, RIGHT KNEE: ICD-10-CM

## 2023-07-13 RX ORDER — CELECOXIB 200 MG/1
CAPSULE ORAL
Qty: 60 CAPSULE | Refills: 0 | OUTPATIENT
Start: 2023-07-13

## 2023-07-14 DIAGNOSIS — M17.11 UNILATERAL PRIMARY OSTEOARTHRITIS, RIGHT KNEE: ICD-10-CM

## 2023-07-14 DIAGNOSIS — I10 ESSENTIAL (PRIMARY) HYPERTENSION: ICD-10-CM

## 2023-07-14 DIAGNOSIS — F41.9 ANXIETY DISORDER, UNSPECIFIED: ICD-10-CM

## 2023-07-17 RX ORDER — CELECOXIB 200 MG/1
CAPSULE ORAL
Qty: 60 CAPSULE | Refills: 0 | OUTPATIENT
Start: 2023-07-17

## 2023-07-17 RX ORDER — LOSARTAN POTASSIUM 100 MG/1
TABLET ORAL
Qty: 90 TABLET | Refills: 4 | OUTPATIENT
Start: 2023-07-17

## 2023-07-17 RX ORDER — ESCITALOPRAM OXALATE 10 MG/1
TABLET ORAL
Qty: 90 TABLET | Refills: 3 | OUTPATIENT
Start: 2023-07-17

## 2023-08-24 DIAGNOSIS — M1A.09X0 IDIOPATHIC CHRONIC GOUT, MULTIPLE SITES, WITHOUT TOPHUS (TOPHI): ICD-10-CM

## 2023-08-24 RX ORDER — ESCITALOPRAM OXALATE 10 MG/1
TABLET ORAL
Qty: 90 TABLET | Refills: 3 | OUTPATIENT
Start: 2023-08-24

## 2023-08-24 RX ORDER — LOSARTAN POTASSIUM 100 MG/1
TABLET ORAL
Qty: 90 TABLET | Refills: 4 | OUTPATIENT
Start: 2023-08-24

## 2023-08-24 RX ORDER — ALLOPURINOL 300 MG/1
TABLET ORAL
Qty: 90 TABLET | Refills: 4 | OUTPATIENT
Start: 2023-08-24

## 2023-11-15 DIAGNOSIS — I10 ESSENTIAL (PRIMARY) HYPERTENSION: ICD-10-CM

## 2023-12-04 ENCOUNTER — TELEPHONE (OUTPATIENT)
Facility: CLINIC | Age: 68
End: 2023-12-04

## 2023-12-04 NOTE — TELEPHONE ENCOUNTER
----- Message from Bella Montaño sent at 12/4/2023 10:35 AM EST -----  Subject: Refill Request    QUESTIONS  Name of Medication? allopurinol (ZYLOPRIM) 300 MG tablet  Patient-reported dosage and instructions? one a day   How many days do you have left? 0  Preferred Pharmacy? [id number]  Pharmacy phone number (if available)? [phone]  Additional Information for Provider? needs other BP medication refill not   sure of name was unable to locate bottle completely out.... would both to   go to [facility]   ---------------------------------------------------------------------------  --------------  CALL BACK INFO  What is the best way for the office to contact you? OK to leave message on   voicemail  Preferred Call Back Phone Number? [phone]  ---------------------------------------------------------------------------  --------------  SCRIPT ANSWERS  Relationship to Patient? Self

## 2023-12-07 RX ORDER — HYDROCHLOROTHIAZIDE 25 MG/1
TABLET ORAL
Qty: 90 TABLET | Refills: 3 | OUTPATIENT
Start: 2023-12-07

## 2024-03-28 RX ORDER — HYDROCHLOROTHIAZIDE 25 MG/1
25 TABLET ORAL DAILY
Qty: 90 TABLET | Refills: 0 | OUTPATIENT
Start: 2024-03-28

## (undated) DEVICE — NEEDLE SPNL 22GA L3.5IN BLK HUB S STL REG WALL FIT STYL W/

## (undated) DEVICE — KIT CLN UP BON SECOURS MARYV

## (undated) DEVICE — SLIM BODY SKIN STAPLER: Brand: APPOSE ULC

## (undated) DEVICE — NEEDLE HYPO 21GA L1.5IN INTRAMUSCULAR S STL LATCH BVL UP

## (undated) DEVICE — SUTURE VCRL SZ 2 L27IN ABSRB VLT L65MM TP-1 1/2 CIR J649G

## (undated) DEVICE — (D)PREP SKN CHLRAPRP APPL 26ML -- CONVERT TO ITEM 371833

## (undated) DEVICE — STRYKER PERFORMANCE SERIES SAGITTAL BLADE: Brand: STRYKER PERFORMANCE SERIES

## (undated) DEVICE — PACK SURG BSHR TOT KNEE LF

## (undated) DEVICE — FLEX ADVANTAGE 3000CC: Brand: FLEX ADVANTAGE

## (undated) DEVICE — DRSG PATCH ANTIMIC 1INX4.0MM -- CONVERT TO ITEM 356053

## (undated) DEVICE — Device

## (undated) DEVICE — NEEDLE HYPO 18GA L1.5IN PNK S STL HUB POLYPR SHLD REG BVL

## (undated) DEVICE — BLADE RMFG DBL RECIP DBL SD --

## (undated) DEVICE — SUTURE MCRYL SZ 2-0 L36IN ABSRB UD L36MM CT-1 1/2 CIR Y945H

## (undated) DEVICE — 3M™ TEGADERM™ TRANSPARENT FILM DRESSING FRAME STYLE, 1626W, 4 IN X 4-3/4 IN (10 CM X 12 CM), 50/CT 4CT/CASE: Brand: 3M™ TEGADERM™

## (undated) DEVICE — REM POLYHESIVE ADULT PATIENT RETURN ELECTRODE: Brand: VALLEYLAB

## (undated) DEVICE — PADDING CAST W6INXL4YD ST COT COHESIVE HND TEARABLE SPEC

## (undated) DEVICE — SYR LR LCK 1ML GRAD NSAF 30ML --

## (undated) DEVICE — SKIN MARKER,REGULAR TIP WITH RULER AND LABELS: Brand: DEVON

## (undated) DEVICE — INTENDED FOR TISSUE SEPARATION, AND OTHER PROCEDURES THAT REQUIRE A SHARP SURGICAL BLADE TO PUNCTURE OR CUT.: Brand: BARD-PARKER ®  SAFETY SCALPED

## (undated) DEVICE — DRAPE XR C ARM 41X74IN LF --

## (undated) DEVICE — UNIT THER SEMI CLS LOOP RECIRC SYS W/ UNIV WRP ON PD ICEMAN

## (undated) DEVICE — SPONGE LAP 18X18IN STRL -- 5/PK

## (undated) DEVICE — SUTURE VCRL SZ 0 L27IN ABSRB UD L36MM CT-1 1/2 CIR J260H

## (undated) DEVICE — ABDOMINAL PAD: Brand: DERMACEA

## (undated) DEVICE — FORCEPS ENDOSCP BX L230CM DIA2.8MM ALGTR CUP SPEC RETRV GI

## (undated) DEVICE — BANDAGE COMPR W6INXL3YD EXSANGUATION 1 PLY ESMARCH

## (undated) DEVICE — SMARTSLEEVE SURGICAL GOWN, 3XL LONG: Brand: CONVERTORS

## (undated) DEVICE — CATH IV SAFE STR 22GX1IN BLU -- PROTECTIV PLUS

## (undated) DEVICE — Z DISCONTINUED BY MEDLINE USE 2711682 TRAY SKIN PREP DRY W/ PREM GLV

## (undated) DEVICE — SOLUTION IV 1000ML 0.9% SOD CHL

## (undated) DEVICE — BIPOLAR SEALER 23-112-1 AQM 6.0: Brand: AQUAMANTYS ®

## (undated) DEVICE — 3M™ STERI-DRAPE™ INSTRUMENT POUCH 1018: Brand: STERI-DRAPE™

## (undated) DEVICE — ELASTIC BANDAGE 6": Brand: CARDINAL HEALTH

## (undated) DEVICE — LIGHT HANDLE: Brand: DEVON

## (undated) DEVICE — GOWN,REINFORCED,POLY,AURORA,XXLARGE,STR: Brand: MEDLINE

## (undated) DEVICE — BUR SURG 10 FLUT 10 MM RND CARBIDE UPWR

## (undated) DEVICE — SOFT SILICONE HYDROCELLULAR SACRUM DRESSING WITH LOCK AWAY LAYER: Brand: ALLEVYN LIFE SACRUM (LARGE) PACK OF 10

## (undated) DEVICE — Z DISCONTINUED USE 2744636  DRESSING AQUACEL 14 IN ALG W3.5XL14IN POLYUR FLM CVR W/ HYDRCOLL

## (undated) DEVICE — X-RAY SPONGES,12 PLY: Brand: DERMACEA

## (undated) DEVICE — HANDPIECE SET WITH HIGH FLOW TIP AND SUCTION TUBE: Brand: INTERPULSE

## (undated) DEVICE — 3M™ BAIR PAWS FLEX™ WARMING GOWN, STANDARD, 20 PER CASE 81003: Brand: BAIR PAWS™

## (undated) DEVICE — JP 3-SPRING RES W/15FR PVC DRAIN/TR: Brand: CARDINAL HEALTH

## (undated) DEVICE — GAUZE SPONGES,16 PLY: Brand: CURITY

## (undated) DEVICE — STOCKING COMPR XL L16-18IN LNG 19MMHG ANK 10-11IN CALF

## (undated) DEVICE — SYRINGE NDL 23GA 3ML L1IN TURQ PLAS NDL S STL SHLD HYPO BVL